# Patient Record
Sex: FEMALE | Race: BLACK OR AFRICAN AMERICAN | Employment: OTHER | URBAN - METROPOLITAN AREA
[De-identification: names, ages, dates, MRNs, and addresses within clinical notes are randomized per-mention and may not be internally consistent; named-entity substitution may affect disease eponyms.]

---

## 2020-10-28 ENCOUNTER — APPOINTMENT (OUTPATIENT)
Dept: CT IMAGING | Age: 70
End: 2020-10-28
Payer: MEDICARE

## 2020-10-28 ENCOUNTER — HOSPITAL ENCOUNTER (EMERGENCY)
Age: 70
Discharge: HOME | End: 2020-10-28
Attending: EMERGENCY MEDICINE
Payer: MEDICARE

## 2020-10-28 VITALS
DIASTOLIC BLOOD PRESSURE: 66 MMHG | OXYGEN SATURATION: 97 % | HEART RATE: 65 BPM | WEIGHT: 170 LBS | RESPIRATION RATE: 16 BRPM | SYSTOLIC BLOOD PRESSURE: 140 MMHG | BODY MASS INDEX: 29.02 KG/M2 | HEIGHT: 64 IN | TEMPERATURE: 98.5 F

## 2020-10-28 LAB
-: ABNORMAL
ABSOLUTE EOS #: 0.07 K/UL (ref 0–0.44)
ABSOLUTE IMMATURE GRANULOCYTE: 0.02 K/UL (ref 0–0.3)
ABSOLUTE LYMPH #: 2.66 K/UL (ref 1.1–3.7)
ABSOLUTE MONO #: 0.36 K/UL (ref 0.1–1.2)
ALBUMIN SERPL-MCNC: 4.3 G/DL (ref 3.5–5.2)
ALBUMIN/GLOBULIN RATIO: ABNORMAL (ref 1–2.5)
ALP BLD-CCNC: 52 U/L (ref 35–104)
ALT SERPL-CCNC: 8 U/L (ref 5–33)
AMORPHOUS: ABNORMAL
ANION GAP SERPL CALCULATED.3IONS-SCNC: 13 MMOL/L (ref 9–17)
AST SERPL-CCNC: 12 U/L
BACTERIA: ABNORMAL
BASOPHILS # BLD: 1 % (ref 0–2)
BASOPHILS ABSOLUTE: 0.03 K/UL (ref 0–0.2)
BILIRUB SERPL-MCNC: 0.53 MG/DL (ref 0.3–1.2)
BILIRUBIN URINE: ABNORMAL
BUN BLDV-MCNC: 8 MG/DL (ref 8–23)
BUN/CREAT BLD: 16 (ref 9–20)
CALCIUM SERPL-MCNC: 9.3 MG/DL (ref 8.6–10.4)
CASTS UA: ABNORMAL /LPF
CHLORIDE BLD-SCNC: 102 MMOL/L (ref 98–107)
CO2: 22 MMOL/L (ref 20–31)
COLOR: YELLOW
COMMENT UA: ABNORMAL
CREAT SERPL-MCNC: 0.49 MG/DL (ref 0.5–0.9)
CRYSTALS, UA: ABNORMAL /HPF
DIFFERENTIAL TYPE: NORMAL
EOSINOPHILS RELATIVE PERCENT: 1 % (ref 1–4)
EPITHELIAL CELLS UA: ABNORMAL /HPF (ref 0–5)
GFR AFRICAN AMERICAN: >60 ML/MIN
GFR NON-AFRICAN AMERICAN: >60 ML/MIN
GFR SERPL CREATININE-BSD FRML MDRD: ABNORMAL ML/MIN/{1.73_M2}
GFR SERPL CREATININE-BSD FRML MDRD: ABNORMAL ML/MIN/{1.73_M2}
GLUCOSE BLD-MCNC: 104 MG/DL (ref 70–99)
GLUCOSE URINE: NEGATIVE
HCT VFR BLD CALC: 41.9 % (ref 36.3–47.1)
HEMOGLOBIN: 14.3 G/DL (ref 11.9–15.1)
IMMATURE GRANULOCYTES: 0 %
KETONES, URINE: ABNORMAL
LEUKOCYTE ESTERASE, URINE: NEGATIVE
LIPASE: 12 U/L (ref 13–60)
LYMPHOCYTES # BLD: 40 % (ref 24–43)
MCH RBC QN AUTO: 31 PG (ref 25.2–33.5)
MCHC RBC AUTO-ENTMCNC: 34.1 G/DL (ref 28.4–34.8)
MCV RBC AUTO: 90.7 FL (ref 82.6–102.9)
MONOCYTES # BLD: 5 % (ref 3–12)
MUCUS: ABNORMAL
NITRITE, URINE: NEGATIVE
NRBC AUTOMATED: 0 PER 100 WBC
OTHER OBSERVATIONS UA: ABNORMAL
PDW BLD-RTO: 12.5 % (ref 11.8–14.4)
PH UA: 6 (ref 5–8)
PLATELET # BLD: 264 K/UL (ref 138–453)
PLATELET ESTIMATE: NORMAL
PMV BLD AUTO: 9.6 FL (ref 8.1–13.5)
POTASSIUM SERPL-SCNC: 3.7 MMOL/L (ref 3.7–5.3)
PROTEIN UA: NEGATIVE
RBC # BLD: 4.62 M/UL (ref 3.95–5.11)
RBC # BLD: NORMAL 10*6/UL
RBC UA: ABNORMAL /HPF (ref 0–2)
RENAL EPITHELIAL, UA: ABNORMAL /HPF
SEG NEUTROPHILS: 53 % (ref 36–65)
SEGMENTED NEUTROPHILS ABSOLUTE COUNT: 3.47 K/UL (ref 1.5–8.1)
SODIUM BLD-SCNC: 137 MMOL/L (ref 135–144)
SPECIFIC GRAVITY UA: 1.04 (ref 1–1.03)
TOTAL PROTEIN: 7.4 G/DL (ref 6.4–8.3)
TRICHOMONAS: ABNORMAL
TURBIDITY: ABNORMAL
URINE HGB: ABNORMAL
UROBILINOGEN, URINE: NORMAL
WBC # BLD: 6.6 K/UL (ref 3.5–11.3)
WBC # BLD: NORMAL 10*3/UL
WBC UA: ABNORMAL /HPF (ref 0–5)
YEAST: ABNORMAL

## 2020-10-28 PROCEDURE — 81001 URINALYSIS AUTO W/SCOPE: CPT

## 2020-10-28 PROCEDURE — 83690 ASSAY OF LIPASE: CPT

## 2020-10-28 PROCEDURE — 74176 CT ABD & PELVIS W/O CONTRAST: CPT

## 2020-10-28 PROCEDURE — 99283 EMERGENCY DEPT VISIT LOW MDM: CPT

## 2020-10-28 PROCEDURE — 80053 COMPREHEN METABOLIC PANEL: CPT

## 2020-10-28 PROCEDURE — 85025 COMPLETE CBC W/AUTO DIFF WBC: CPT

## 2020-10-28 RX ORDER — NAPROXEN 500 MG/1
500 TABLET ORAL 2 TIMES DAILY WITH MEALS
Qty: 20 TABLET | Refills: 0 | Status: SHIPPED | OUTPATIENT
Start: 2020-10-28 | End: 2021-03-22 | Stop reason: ALTCHOICE

## 2020-10-28 RX ORDER — ALBUTEROL SULFATE 90 UG/1
2 AEROSOL, METERED RESPIRATORY (INHALATION) EVERY 4 HOURS PRN
Qty: 1 INHALER | Refills: 0 | Status: SHIPPED | OUTPATIENT
Start: 2020-10-28

## 2020-10-28 RX ORDER — HYDROCHLOROTHIAZIDE 12.5 MG/1
12.5 CAPSULE, GELATIN COATED ORAL DAILY
COMMUNITY
End: 2020-11-06 | Stop reason: SDUPTHER

## 2020-10-28 RX ORDER — METHOCARBAMOL 500 MG/1
500 TABLET, FILM COATED ORAL 3 TIMES DAILY
Qty: 21 TABLET | Refills: 0 | Status: SHIPPED | OUTPATIENT
Start: 2020-10-28 | End: 2020-11-04

## 2020-10-28 RX ORDER — ASPIRIN 81 MG/1
81 TABLET ORAL DAILY
COMMUNITY

## 2020-10-28 RX ORDER — AMLODIPINE BESYLATE 10 MG/1
10 TABLET ORAL DAILY
COMMUNITY
End: 2020-11-06 | Stop reason: SDUPTHER

## 2020-10-28 ASSESSMENT — PAIN DESCRIPTION - LOCATION: LOCATION: FLANK

## 2020-10-28 ASSESSMENT — PAIN DESCRIPTION - PAIN TYPE: TYPE: ACUTE PAIN

## 2020-10-28 ASSESSMENT — PAIN SCALES - GENERAL: PAINLEVEL_OUTOF10: 9

## 2020-10-28 ASSESSMENT — PAIN DESCRIPTION - ORIENTATION: ORIENTATION: RIGHT

## 2020-10-28 ASSESSMENT — PAIN DESCRIPTION - DESCRIPTORS: DESCRIPTORS: ACHING

## 2020-10-28 NOTE — ED PROVIDER NOTES
[facility] ED  [facility]      Pt Name: Dusty Loyola  MRN: [id number]  [place] 1950  Date ofevaluation: 10/28/2020  Provider: John Lafleur Dr       Chief Complaint   Patient presents with    Flank Pain     right flank pain x yesterday          HISTORY OF PRESENT ILLNESS  (Location/Symptom, Timing/Onset, Context/Setting, Quality, Duration, Modifying Factors, Severity.)   Dusty Loyola is a 69 y.o. female who presents to the emergency department with right flank pain since yesterday reports some nausea with couple episodes of vomiting. No definite alleviating aggravating factors. Patient states when she felt like this in the past it was secondary to a low potassium. Nursing Notes were reviewed. ALLERGIES     Patient has no known allergies. CURRENT MEDICATIONS       Discharge Medication List as of 10/28/2020  7:21 PM      CONTINUE these medications which have NOT CHANGED    Details   hydroCHLOROthiazide (MICROZIDE) 12.5 MG capsule Take 12.5 mg by mouth dailyHistorical Med      amLODIPine (NORVASC) 10 MG tablet Take 10 mg by mouth dailyHistorical Med      aspirin 81 MG EC tablet Take 81 mg by mouth dailyHistorical Med             PAST MEDICAL HISTORY   No past medical history on file. SURGICAL HISTORY     No past surgical history on file. FAMILY HISTORY     No family history on file. No family status information on file. SOCIAL HISTORY      reports that she has been smoking cigarettes. She has been smoking about 0.50 packs per day. She has never used smokeless tobacco. She reports previous alcohol use. She reports previous drug use. REVIEW OFSYSTEMS    (2-9 systems for level 4, 10 or more for level 5)   Review of Systems    Except as noted above the remainder of the review of systems was reviewed and negative.      PHYSICAL EXAM    (up to 7 for level 4, 8 or more for level 5)     ED Triage Vitals   BP Temp Temp Source Pulse Resp SpO2 Height Weight   10/28/20 1554 10/28/20 1552 10/28/20 1552 10/28/20 1552 10/28/20 1552 10/28/20 1552 10/28/20 1552 10/28/20 1552   (!) 140/66 98.5 °F (36.9 °C) Oral 65 16 97 % 5' 4\" (1.626 m) 170 lb (77.1 kg)      Physical Exam  Constitutional:       Appearance: She is well-developed. HENT:      Head: Normocephalic and atraumatic. Neck:      Musculoskeletal: Normal range of motion and neck supple. Cardiovascular:      Rate and Rhythm: Normal rate and regular rhythm. Pulmonary:      Effort: Pulmonary effort is normal.      Breath sounds: Normal breath sounds. Abdominal:      Palpations: Abdomen is soft. Musculoskeletal: Normal range of motion. Skin:     General: Skin is warm. Findings: No rash. Neurological:      Mental Status: She is alert and oriented to person, place, and time. Psychiatric:         Behavior: Behavior normal.                 DIAGNOSTIC RESULTS     EKG: All EKG's are interpreted by the Emergency Department Physician who either signs or Co-signs this chart in the absence of a cardiologist.        RADIOLOGY:   Non-plain film images such as CT, Ultrasound and MRI are read by the radiologist. Plain radiographic images arevisualized and preliminarily interpreted by the emergency physician with the below findings:        Interpretation per the Radiologist below, if available at thetime of this note:          ED BEDSIDE ULTRASOUND:   Performed by ED Physician - none    LABS:  Labs Reviewed   URINE RT REFLEX TO CULTURE - Abnormal; Notable for the following components:       Result Value    Turbidity UA SLIGHTLY CLOUDY (*)     Bilirubin Urine   (*)     Value: Presumptive positive. Unable to confirm due to unavailability of reagent.     Ketones, Urine 2+ (*)     Specific Gravity, UA 1.041 (*)     Urine Hgb TRACE (*)     All other components within normal limits   MICROSCOPIC URINALYSIS - Abnormal; Notable for the following components:    Bacteria, UA MODERATE (*)     Mucus, UA 1+ (*)     All other components within normal limits   COMPREHENSIVE METABOLIC PANEL - Abnormal; Notable for the following components:    Glucose 104 (*)     CREATININE 0.49 (*)     All other components within normal limits   LIPASE - Abnormal; Notable for the following components:    Lipase 12 (*)     All other components within normal limits   CBC WITH AUTO DIFFERENTIAL       All other labs were within normal range or not returned as of this dictation. EMERGENCY DEPARTMENT COURSE and DIFFERENTIAL DIAGNOSIS/MDM:   Vitals:    Vitals:    10/28/20 1552 10/28/20 1554   BP:  (!) 140/66   Pulse: 65    Resp: 16    Temp: 98.5 °F (36.9 °C)    TempSrc: Oral    SpO2: 97%    Weight: 170 lb (77.1 kg)    Height: 5' 4\" (1.626 m)      Work-up is negative for any acute process. Patient will be discharged home and treated symptomatically. Symptoms could related to muscle issue. Warning signs for return were discussed. No signs of appendicitis. Gallbladder has been removed. Patient also requested a inhaler refill. She moved back from [place] to this area in March and has recently run out of her inhaler and requested a refill. CONSULTS:  None    PROCEDURES:  Procedures        FINAL IMPRESSION      1. Flank pain    2. Pulmonary nodules          DISPOSITION/PLAN   DISPOSITION Decision To Discharge 10/28/2020 07:20:07 PM      PATIENTREFERRED TO:   No follow-up provider specified.     DISCHARGE MEDICATIONS:     Discharge Medication List as of 10/28/2020  7:21 PM      START taking these medications    Details   naproxen (NAPROSYN) 500 MG tablet Take 1 tablet by mouth 2 times daily (with meals), Disp-20 tablet,R-0Print      methocarbamol (ROBAXIN) 500 MG tablet Take 1 tablet by mouth 3 times daily for 7 days, Disp-21 tablet,R-0Print                 (Please note that portions of this note were completed with a voice recognition program.  Efforts were made to edit thedictations but occasionally words are detailed exam mis-transcribed.)    ABBY Hodge PA-C  10/28/20 1940 detailed exam

## 2020-11-06 ENCOUNTER — OFFICE VISIT (OUTPATIENT)
Dept: FAMILY MEDICINE CLINIC | Age: 70
End: 2020-11-06
Payer: MEDICARE

## 2020-11-06 ENCOUNTER — HOSPITAL ENCOUNTER (OUTPATIENT)
Age: 70
Setting detail: SPECIMEN
Discharge: HOME | End: 2020-11-06
Payer: MEDICARE

## 2020-11-06 VITALS
BODY MASS INDEX: 23.12 KG/M2 | HEIGHT: 64 IN | SYSTOLIC BLOOD PRESSURE: 132 MMHG | TEMPERATURE: 97.1 F | DIASTOLIC BLOOD PRESSURE: 81 MMHG | WEIGHT: 135.4 LBS | HEART RATE: 72 BPM | OXYGEN SATURATION: 99 %

## 2020-11-06 PROBLEM — E27.8 ADRENAL NODULE (HCC): Status: ACTIVE | Noted: 2020-11-06

## 2020-11-06 PROBLEM — R91.1 PULMONARY NODULE, LEFT: Status: ACTIVE | Noted: 2020-11-06

## 2020-11-06 PROBLEM — R73.9 HYPERGLYCEMIA: Status: ACTIVE | Noted: 2020-11-06

## 2020-11-06 PROBLEM — I10 ESSENTIAL HYPERTENSION: Status: ACTIVE | Noted: 2020-11-06

## 2020-11-06 PROBLEM — E27.9 ADRENAL NODULE (HCC): Status: ACTIVE | Noted: 2020-11-06

## 2020-11-06 PROBLEM — M25.561 CHRONIC PAIN OF RIGHT KNEE: Status: ACTIVE | Noted: 2020-11-06

## 2020-11-06 PROBLEM — R01.1 MURMUR, CARDIAC: Status: ACTIVE | Noted: 2020-11-06

## 2020-11-06 PROBLEM — G89.29 CHRONIC PAIN OF RIGHT KNEE: Status: ACTIVE | Noted: 2020-11-06

## 2020-11-06 LAB
BILIRUBIN, POC: NORMAL
BLOOD URINE, POC: NORMAL
CHOLESTEROL, FASTING: 182 MG/DL
CHOLESTEROL/HDL RATIO: 3.9
CLARITY, POC: NORMAL
COLOR, POC: NORMAL
GLUCOSE URINE, POC: NORMAL
HBA1C MFR BLD: 5.4 %
HDLC SERPL-MCNC: 47 MG/DL
HEPATITIS C ANTIBODY: NONREACTIVE
KETONES, POC: NORMAL
LDL CHOLESTEROL: 117 MG/DL (ref 0–130)
LEUKOCYTE EST, POC: NORMAL
NITRITE, POC: NORMAL
PH, POC: 6
PROTEIN, POC: 30
SPECIFIC GRAVITY, POC: 1.03
TRIGLYCERIDE, FASTING: 90 MG/DL
UROBILINOGEN, POC: 4
VLDLC SERPL CALC-MCNC: NORMAL MG/DL (ref 1–30)

## 2020-11-06 PROCEDURE — 81002 URINALYSIS NONAUTO W/O SCOPE: CPT | Performed by: NURSE PRACTITIONER

## 2020-11-06 PROCEDURE — 99205 OFFICE O/P NEW HI 60 MIN: CPT | Performed by: NURSE PRACTITIONER

## 2020-11-06 PROCEDURE — 83036 HEMOGLOBIN GLYCOSYLATED A1C: CPT | Performed by: NURSE PRACTITIONER

## 2020-11-06 PROCEDURE — 93000 ELECTROCARDIOGRAM COMPLETE: CPT | Performed by: NURSE PRACTITIONER

## 2020-11-06 PROCEDURE — 99406 BEHAV CHNG SMOKING 3-10 MIN: CPT | Performed by: NURSE PRACTITIONER

## 2020-11-06 PROCEDURE — G0444 DEPRESSION SCREEN ANNUAL: HCPCS | Performed by: NURSE PRACTITIONER

## 2020-11-06 RX ORDER — AMLODIPINE BESYLATE 10 MG/1
10 TABLET ORAL DAILY
Qty: 30 TABLET | Refills: 2 | Status: SHIPPED | OUTPATIENT
Start: 2020-11-06 | End: 2021-03-22 | Stop reason: SDUPTHER

## 2020-11-06 RX ORDER — HYDROCHLOROTHIAZIDE 12.5 MG/1
12.5 CAPSULE, GELATIN COATED ORAL DAILY
Qty: 30 CAPSULE | Refills: 2 | Status: SHIPPED | OUTPATIENT
Start: 2020-11-06 | End: 2021-03-22 | Stop reason: SDUPTHER

## 2020-11-06 SDOH — ECONOMIC STABILITY: FOOD INSECURITY: WITHIN THE PAST 12 MONTHS, YOU WORRIED THAT YOUR FOOD WOULD RUN OUT BEFORE YOU GOT MONEY TO BUY MORE.: NEVER TRUE

## 2020-11-06 SDOH — ECONOMIC STABILITY: FOOD INSECURITY: WITHIN THE PAST 12 MONTHS, THE FOOD YOU BOUGHT JUST DIDN'T LAST AND YOU DIDN'T HAVE MONEY TO GET MORE.: NEVER TRUE

## 2020-11-06 SDOH — ECONOMIC STABILITY: INCOME INSECURITY: HOW HARD IS IT FOR YOU TO PAY FOR THE VERY BASICS LIKE FOOD, HOUSING, MEDICAL CARE, AND HEATING?: NOT HARD AT ALL

## 2020-11-06 ASSESSMENT — PATIENT HEALTH QUESTIONNAIRE - PHQ9
1. LITTLE INTEREST OR PLEASURE IN DOING THINGS: 0
SUM OF ALL RESPONSES TO PHQ QUESTIONS 1-9: 0
2. FEELING DOWN, DEPRESSED OR HOPELESS: 0
SUM OF ALL RESPONSES TO PHQ QUESTIONS 1-9: 0
SUM OF ALL RESPONSES TO PHQ9 QUESTIONS 1 & 2: 0
SUM OF ALL RESPONSES TO PHQ QUESTIONS 1-9: 0

## 2020-11-06 NOTE — PROGRESS NOTES
FABIEN Barcenas-CECILIO  [address]  [id number] [facility] [place]. Pete Oliver  [facility], [address]  [phone]  [phone]    Elizabeth Regalado is a 69 y.o. female who is here with c/o of:    Chief Complaint: New Patient      Patient Accompanied by: n/a    HPI - Elizabeth Regalado is here today with c/o:    Patient recently moved here from [place] (March) and is wanting to establish care today for her chronic conditions and f/u from her recent ED visit for Right flank pain. Right flank pain   - patient reports this has been a problem on/off for a few months and became worse a week ago   - since hospitalization, she reports the pain has subsided   - she denies dysuria, frequency, urgency    HTN   - she is not compliant with low sodium diet and admits she likes to eat junk food and is not physically active   - she does c/o palpitations, chest pressure and has known murmur   - she denies h/a, lower extremity edema   - current medications: HCTZ 12.5mg daily, amlodipine 10mg daily and has not taken for the past 2 days as she was out of her medication    Lab Results   Component Value Date    LABA1C 5.4 11/06/2020     Lab Results   Component Value Date    CREATININE 0.49 (L) 10/28/2020     Murmur   - patient reports murmur of several years and denies consultation with cardiology or previous Echo   - she does report chest pressure, palpitations and occasional exertional sob    Right knee pain   - this is a chronic problem of several years   - she reports she has had surgery on this knee in the past   - she is currently taking NSAIDS for this and this is helping   - aggravating factors include standing and walking    There is no problem list on file for this patient. Past Medical History:   Diagnosis Date    Hypertension       No past surgical history on file. No family history on file.   Social History     Tobacco Use    Smoking status: Heavy Tobacco Smoker     Packs/day: 0.50     Types: Cigarettes    Smokeless tobacco: Never Used   Substance Use Topics    Alcohol use: Not Currently     ALLERGIES:  No Known Allergies       Subjective     · Constitutional:  Negative for activity change, appetite change,unexpected weight change, chills, fever, and fatigue. · HENT: Negative for ear pain, sore throat,  Rhinorrhea, sinus pain, sinus pressure, congestion. · Eyes:  Negative for pain and discharge. · Respiratory:  Negative for chest tightness, shortness of breath, wheezing, and cough. · Cardiovascular:  Negative for chest pain, palpitations and leg swelling. · Gastrointestinal: Negative for abdominal pain, blood in stool, constipation,diarrhea, nausea and vomiting. · Endocrine: Negative for cold intolerance, heat intolerance, polydipsia, polyphagia and polyuria. · Genitourinary: Negative for difficulty urinating, dysuria, frequency, hematuria and urgency. Positive for right flank pain  · Musculoskeletal: Negative for arthralgias, back pain, joint swelling, myalgias, neck pain and neck stiffness. Positive for right knee pain  · Skin: Negative for rash and wound. · Allergic/Immunologic: Negative for environmental allergies and food allergies. · Neurological:  Negative for dizziness, light-headedness, numbness and headaches. · Hematological:  Negative for adenopathy. Does not bruise/bleed easily. · Psychiatric/Behavioral: Negative for self-injury, sleep disturbance and suicidal ideas. Objective     PHYSICAL EXAM:   · Constitutional: Josefa Shell is oriented to person, place, and time. Vital signs are normal. Appears well-developed and well-nourished. · HEENT:   · Head: Normocephalic and atraumatic. Right Ear: Hearing and external ear normal. TM normal  Canal normal  · Left Ear: Hearing and external ear normal. TM normal Canal normal  · Eyes:PERRL, EOMI, Conjunctiva normal, No discharge. · Neck: Full passive range of motion. Non-tender on palpation. Neck supple. No thyromegaly present. Trachea normal.  · Cardiovascular: Normal rate, regular rhythm, S1, S2, Grade IV murmur, no gallop, no friction rub, intact distal pulses. · Pulmonary/Chest: Breath sounds are clear throughout, No respiratory distress, No wheezing, No chest tenderness. Effort normal  · Abdominal: Soft. Normal appearance, bowel sounds are present and normoactive. There is no hepatosplenomegaly. There is no tenderness. There is no CVA tenderness. Musculoskeletal: Extremities appear regular and symmetric. No evident masses, lesions, foreign bodies, or other abnormalities. No edema. No tenderness on palpation. Joints are stable. Full ROM, strength and tone are within normal limits. Physical Exam  Musculoskeletal:      Right knee: She exhibits normal range of motion, no swelling, no effusion, no ecchymosis, no deformity, no erythema, normal alignment, no LCL laxity, normal patellar mobility, no bony tenderness, normal meniscus and no MCL laxity. No tenderness found. · Lymphadenopathy: No lymphadenopathy noted. · Neurological: Alert and oriented to person, place, and time. Normal motor function, Normal sensory function, No focal deficits noted. He has normal strength. · Skin: Skin is warm, dry and intact. No obvious lesions on exposed skin  · Psychiatric: Normal mood and affect. Speech is normal and behavior is normal.     Nursing note and vitals reviewed. Blood pressure 132/81, pulse 72, temperature 97.1 °F (36.2 °C), temperature source Temporal, height 5' 4\" (1.626 m), weight 135 lb 6.4 oz (61.4 kg), SpO2 99 %. Body mass index is 23.24 kg/m².     Wt Readings from Last 3 Encounters:   11/06/20 135 lb 6.4 oz (61.4 kg)   10/28/20 170 lb (77.1 kg)     BP Readings from Last 3 Encounters:   11/06/20 132/81   10/28/20 (!) 140/66       Results for POC orders placed in visit on 11/06/20   POCT glycosylated hemoglobin (Hb A1C)   Result Value Ref Range    Hemoglobin A1C 5.4 %   POCT Urinalysis no Micro   Result Value Ref Range Color, UA Dark Yellow     Clarity, UA Cloudy     Glucose, UA POC Neg     Bilirubin, UA Small     Ketones, UA Neg     Spec Grav, UA 1.030     Blood, UA POC Neg     pH, UA 6.0     Protein, UA POC 30     Urobilinogen, UA 4.0     Leukocytes, UA Neg     Nitrite, UA Neg        Completed Orders/Prescriptions   Orders Placed This Encounter   Medications    hydroCHLOROthiazide (MICROZIDE) 12.5 MG capsule     Sig: Take 1 capsule by mouth daily     Dispense:  30 capsule     Refill:  2    amLODIPine (NORVASC) 10 MG tablet     Sig: Take 1 tablet by mouth daily     Dispense:  30 tablet     Refill:  2               AssessmentPlan/Medical Decision Making     *all notes and dx testing reviewed from recent ED visit*    1. Essential hypertension  - controlled  - reviewed DASH diet  - stressed the importance of smoking cessation  - hydroCHLOROthiazide (MICROZIDE) 12.5 MG capsule; Take 1 capsule by mouth daily  Dispense: 30 capsule; Refill: 2  - amLODIPine (NORVASC) 10 MG tablet; Take 1 tablet by mouth daily  Dispense: 30 tablet; Refill: 2    2. Palpitations  - ECHO Complete 2D W Doppler W Color; Future  - 23619 - [place] ELECTROCARDIOGRAM, COMPLETE  - [facility] - Brett Montenegro MD, Cardiology, [place]    3. Chest pressure  - will consult cardiology  - suggest stress test   - ECHO Complete 2D W Doppler W Color; Future  - [id number] - [place] ELECTROCARDIOGRAM, COMPLETE  - [facility] - Brett Montenegro MD, Cardiology, [place]    4. Murmur, cardiac  - ECHO Complete 2D W Doppler W Color; Future  - [facility] - Brett Montenegro MD, Cardiology, [place]    5. Encounter for screening mammogram for breast cancer  - [facility] Digital Screen Bilateral [[id number]]; Future    6. Hyperglycemia  - POCT Urinalysis no Micro  - POCT glycosylated hemoglobin (Hb A1C)    7. Ketonuria  - POCT Urinalysis no Micro  - Culture, Urine; Future    8. Adrenal nodule ([address]  - suspicious for cancerous origin - will consult urology  - [facility], [facility]    9.  Chronic pain of right knee  - will address at f/u appt    10. Colon cancer screening  - Jorge Alex MD, Gastroenterology, [address]    11. Hematuria, unspecified type  - Culture, Urine; Future    12. Encounter for hepatitis C screening test for low risk patient  - Hepatitis C Antibody; Future    13. Screening cholesterol level  - Lipid, Fasting; Future    14. Post-menopausal  - DEXA Bone Density Axial Skeleton; Future    15. Personal history of tobacco use, presenting hazards to health  - Tobacco Cessation Counseling: Patient advised about behavior change, including information about personal health harms, usage of appropriate cessation measures and benefits of cessation. Time spent (minutes): 5  - [place] TOBACCO USE CESSATION INTERMEDIATE 3-10 MINUTES [74912]    16. Encounter for medical examination to establish care    17. Nodule of lower lobe of left lung  - will continue to monitor at this time    18. Depression screening negative    Return in about 1 month (around 12/6/2020). 1.  Sharyn Galloway received counseling on the following healthy behaviors: nutrition, exercise, medication adherence and tobacco cessation  2. Patient given educational materials - see patient instructions  3. Was a self-tracking handout given in paper form or via [employer]? No  If yes, see orders or list here. 4.  Discussed use, benefit, and side effects of prescribed medications. Barriers to medication compliance addressed. All patient questions answered. Pt voiced understanding. 5.  Reviewed prior labs, imaging, consultation, follow up, and health maintenance  6. Continue current medications, diet and exercise. 7. Discussed use, benefit, and side effects of prescribed medications. Barriers to medication compliance addressed. All her questions were answered. Pt voiced understanding. Sharyn Galloway will continue current medications, diet and exercise.     Patient given educational materials on smoking cessation    Of the 60 minute duration appointment visit, Olga Siddiqui CNP spent at least 50% of the face-to-face time in counseling, explanation of diagnosis, planning of further management, and answering all questions. Signed:  Olga Siddiqui CNP    This note is created with the assistance of a speech-recognition program.  While intending to generate a document that actually reflects the content of the visit, no guarantees can be provided that every mistake has been identified and corrected by editing.

## 2020-11-06 NOTE — PATIENT INSTRUCTIONS
smoking. · Consider signing up for a smoking cessation program, such as the American Lung Association's Freedom from Smoking program.  · Get text messaging support. Go to the website at www.smokefree. gov to sign up for the [facility] program.  · Set a quit date. Pick your date carefully so that it is not right in the middle of a big deadline or stressful time. Once you quit, do not even take a puff. Get rid of all ashtrays and lighters after your last cigarette. Clean your house and your clothes so that they do not smell of smoke. · Learn how to be a nonsmoker. Think about ways you can avoid those things that make you reach for a cigarette. ? Avoid situations that put you at greatest risk for smoking. For some people, it is hard to have a drink with friends without smoking. For others, they might skip a coffee break with coworkers who smoke. ? Change your daily routine. Take a different route to work or eat a meal in a different place. · Cut down on stress. Calm yourself or release tension by doing an activity you enjoy, such as reading a book, taking a hot bath, or gardening. · Talk to your doctor or pharmacist about nicotine replacement therapy, which replaces the nicotine in your body. You still get nicotine but you do not use tobacco. Nicotine replacement products help you slowly reduce the amount of nicotine you need. These products come in several forms, many of them available over-the-counter:  ? Nicotine patches  ? Nicotine gum and lozenges  ? Nicotine inhaler  · Ask your doctor about bupropion (Wellbutrin) or varenicline (Chantix), which are prescription medicines. They do not contain nicotine. They help you by reducing withdrawal symptoms, such as stress and anxiety. · Some people find hypnosis, acupuncture, and massage helpful for ending the smoking habit. · Eat a healthy diet and get regular exercise. Having healthy habits will help your body move past its craving for nicotine.   · Be prepared to keep trying. Most people are not successful the first few times they try to quit. Do not get mad at yourself if you smoke again. Make a list of things you learned and think about when you want to try again, such as next week, next month, or next year. Where can you learn more? Go to https://chpepiceweb.healthOUTSIDE THE BOX MARK[employer]. org and sign in to your [employer] account. Enter N068 in the [employer] box to learn more about \"Stopping Smoking: Care Instructions. \"     If you do not have an account, please click on the \"Sign Up Now\" link. Current as of: March 12, 2020               Content Version: 12.6  © 2006-2020 [employer]. Care instructions adapted under license by [facility] ([facility]). If you have questions about a medical condition or this instruction, always ask your healthcare professional. [place] [address] any warranty or liability for your use of this information. Learning About Benefits From Quitting Smoking  How does quitting smoking make you healthier? If you're thinking about quitting smoking, you may have a few reasons to be smoke-free. Your health may be one of them. · When you quit smoking, you lower your risks for cancer, lung disease, heart attack, stroke, blood vessel disease, and blindness from macular degeneration. · When you're smoke-free, you get sick less often, and you heal faster. You are less likely to get colds, flu, bronchitis, and pneumonia. · As a nonsmoker, you may find that your mood is better and you are less stressed. When and how will you feel healthier? Quitting has real health benefits that start from day 1 of being smoke-free. And the longer you stay smoke-free, the healthier you get and the better you feel. The first hours  · After just 20 minutes, your blood pressure and heart rate go down. That means there's less stress on your heart and blood vessels.   · Within 12 hours, the level of carbon monoxide in your blood drops back to normal. That makes room for more oxygen. With more oxygen in your body, you may notice that you have more energy than when you smoked. After 2 weeks  · Your lungs start to work better. · Your risk of heart attack starts to drop. After 1 month  · When your lungs are clear, you cough less and breathe deeper, so it's easier to be active. · Your sense of taste and smell return. That means you can enjoy food more than you have since you started smoking. Over the years  · Over the years, your risks of heart disease, heart attack, and stroke are lower. · After 10 years, your risk of dying from lung cancer is cut by about half. And your risk for many other types of cancer is lower too. How would quitting help others in your life? When you quit smoking, you improve the health of everyone who now breathes in your smoke. · Their heart, lung, and cancer risks drop, much like yours. · They are sick less. For babies and small children, living smoke-free means they're less likely to have ear infections, pneumonia, and bronchitis. · If you're a woman who is or will be pregnant someday, quitting smoking means a healthier . · Children who are close to you are less likely to become adult smokers. Where can you learn more? Go to https://[employer]och[employer]. org and sign in to your [employer] account. Enter [phone] in the [facility] box to learn more about \"Learning About Benefits From Quitting Smoking. \"     If you do not have an account, please click on the \"Sign Up Now\" link. Current as of: 2020               Content Version: 12.6  © 0463-3032 [employer]. Care instructions adapted under license by [facility] ([facility]). If you have questions about a medical condition or this instruction, always ask your healthcare professional. [place] [address] any warranty or liability for your use of this information.

## 2020-11-07 LAB
CULTURE: NORMAL
Lab: NORMAL
SPECIMEN DESCRIPTION: NORMAL

## 2020-11-13 ENCOUNTER — HOSPITAL ENCOUNTER (OUTPATIENT)
Dept: NON INVASIVE DIAGNOSTICS | Age: 70
Discharge: HOME | End: 2020-11-13
Payer: MEDICARE

## 2020-11-13 LAB
LV EF: 68 %
LVEF MODALITY: NORMAL

## 2020-11-13 PROCEDURE — 93306 TTE W/DOPPLER COMPLETE: CPT

## 2020-12-07 ENCOUNTER — OFFICE VISIT (OUTPATIENT)
Dept: UROLOGY | Age: 70
End: 2020-12-07
Payer: MEDICARE

## 2020-12-07 VITALS
BODY MASS INDEX: 27.83 KG/M2 | WEIGHT: 163 LBS | DIASTOLIC BLOOD PRESSURE: 63 MMHG | SYSTOLIC BLOOD PRESSURE: 118 MMHG | HEIGHT: 64 IN | TEMPERATURE: 97.5 F

## 2020-12-07 LAB
BILIRUBIN, POC: NORMAL
BLOOD URINE, POC: NEGATIVE
CLARITY, POC: CLEAR
COLOR, POC: YELLOW
GLUCOSE URINE, POC: NEGATIVE
KETONES, POC: NEGATIVE
LEUKOCYTE EST, POC: NEGATIVE
NITRITE, POC: NEGATIVE
PH, POC: 7
PROTEIN, POC: NEGATIVE
SPECIFIC GRAVITY, POC: >=1.03
UROBILINOGEN, POC: 2

## 2020-12-07 PROCEDURE — 99204 OFFICE O/P NEW MOD 45 MIN: CPT | Performed by: UROLOGY

## 2020-12-07 PROCEDURE — 99202 OFFICE O/P NEW SF 15 MIN: CPT | Performed by: UROLOGY

## 2020-12-07 PROCEDURE — 81003 URINALYSIS AUTO W/O SCOPE: CPT | Performed by: UROLOGY

## 2020-12-07 RX ORDER — DEXAMETHASONE 1 MG
1 TABLET ORAL ONCE
Qty: 1 TABLET | Refills: 0 | Status: SHIPPED | OUTPATIENT
Start: 2020-12-07 | End: 2020-12-07

## 2020-12-07 NOTE — PROGRESS NOTES
Dr. Jame Pascal MD  [facility])  Urology Clinic  New Patient Visit      Patient:  Kris De Dios  YOB: 1950  Date: 12/7/2020    HISTORY OF PRESENT ILLNESS:   The patient is a 69 y.o. female who presents today for evaluation of the following problems: Bilateral adrenal hyperplasia. Hounsfield units on the left were -3, on the right 7. No evidence of cancer based on imaging characteristics. She has no evidence of insulin resistance or visceral obesity. She is hypertensive but well controlled on 2 oral medications, Norvasc and hydrochlorothiazide. No evidence of panic attacks, sweating episodes, etc.  Overall the problem(s) : show no change. Associated Symptoms: No dysuria, gross hematuria. Pain Severity: 0/10    Summary of old records: {  None    Imaging/Labs reviewed during today's visit:  I have independently reviewed and verified the following films during today's visit. CT scan of abdomen from October 2020 was independently reviewed and verified by us. Bilateral adrenal hyperplasia. Imaging characteristics not concerning for adrenal cortical carcinoma or metastasis.     Urinalysis today:  Results for POC orders placed in visit on 12/07/20   POCT Urinalysis No Micro (Auto)   Result Value Ref Range    Color, UA Yellow     Clarity, UA Clear     Glucose, UA POC Negative     Bilirubin, UA SMALL     Ketones, UA Negative     Spec Grav, UA >=1.030     Blood, UA POC Negative     pH, UA 7.0     Protein, UA POC Negative     Urobilinogen, UA 2.0     Leukocytes, UA Negative     Nitrite, UA Negative        Last BUN and creatinine:  Lab Results   Component Value Date    BUN 8 10/28/2020     Lab Results   Component Value Date    CREATININE 0.49 (L) 10/28/2020       Imaging Reviewed during this Office Visit:   (results were independently reviewed by physician and radiology report verified)    PAST MEDICAL, FAMILY AND SOCIAL HISTORY:  Past Medical History:   Diagnosis Date    Hypertension      No past surgical history on file. No family history on file. Outpatient Medications Marked as Taking for the 12/7/20 encounter (Office Visit) with Mansi Manriquez MD   Medication Sig Dispense Refill    dexamethasone (DECADRON) 1 MG tablet Take 1 tablet by mouth once for 1 dose Take tablet at 11pm the night before your lab draw. 1 tablet 0    hydroCHLOROthiazide (MICROZIDE) 12.5 MG capsule Take 1 capsule by mouth daily 30 capsule 2    amLODIPine (NORVASC) 10 MG tablet Take 1 tablet by mouth daily 30 tablet 2    aspirin 81 MG EC tablet Take 81 mg by mouth daily      naproxen (NAPROSYN) 500 MG tablet Take 1 tablet by mouth 2 times daily (with meals) 20 tablet 0    albuterol sulfate HFA (PROAIR HFA) 108 (90 Base) MCG/ACT inhaler Inhale 2 puffs into the lungs every 4 hours as needed for Wheezing 1 Inhaler 0       Patient has no known allergies. Social History     Tobacco Use   Smoking Status Heavy Tobacco Smoker    Packs/day: 0.50    Types: Cigarettes    Start date: 01/1976   Smokeless Tobacco Never Used       Social History     Substance and Sexual Activity   Alcohol Use Not Currently       REVIEW OF SYSTEMS:  Constitutional: negative  Eyes: negative  Respiratory: negative  Cardiovascular: negative  Gastrointestinal: negative  Genitourinary: negative except for what is in HPI  Musculoskeletal: negative  Skin: negative   Neurological: negative  Hematological/Lymphatic: negative  Psychological: negative    Physical Exam:    This a 69 y.o. female      Vitals:    12/07/20 1031   BP: 118/63   Temp: 97.5 °F (36.4 °C)     Constitutional: Patient in no acute distress. Neuro: Alert and oriented to person, place and time. Psych: mood and affect normal  HEENT negative  Lungs: Respiratory effort is normal  Cardiovascular: Normal peripheral pulses  Abdomen: Soft, non-tender, non-distended with no CVA, flank pain or hepatosplenomegaly. No hernias. Kidneys normal.  Lymphatics: No palpable lymphadenopathy.   Bladder non-tender and not distended. Assessment and Plan      1. Urinary frequency    2. Adrenal nodule ([address]           Plan:      Return in about 6 weeks (around 1/18/2021). Metabolic work-up for bilateral adrenal hyperplasia. Imaging characteristics are not concerning for cancer.          Dr. Tran Song MD

## 2020-12-08 ENCOUNTER — HOSPITAL ENCOUNTER (OUTPATIENT)
Age: 70
Discharge: HOME | End: 2020-12-08
Payer: MEDICARE

## 2020-12-08 LAB
CORTISOL COLLECTION INFO: ABNORMAL
CORTISOL: 1.7 UG/DL (ref 2.7–18.4)

## 2020-12-08 PROCEDURE — 84244 ASSAY OF RENIN: CPT

## 2020-12-08 PROCEDURE — 82088 ASSAY OF ALDOSTERONE: CPT

## 2020-12-08 PROCEDURE — 83835 ASSAY OF METANEPHRINES: CPT

## 2020-12-08 PROCEDURE — 82533 TOTAL CORTISOL: CPT

## 2020-12-08 PROCEDURE — 36415 COLL VENOUS BLD VENIPUNCTURE: CPT

## 2020-12-10 LAB
ALDOSTERONE COMMENT: NORMAL
ALDOSTERONE: 7 NG/DL

## 2020-12-11 LAB
METANEPH/PLASMA INTERP: NORMAL
METANEPHRINE: 0.14 NMOL/L (ref 0–0.49)
NORMETANEPHRINE PLASMA: 0.44 NMOL/L (ref 0–0.89)

## 2020-12-12 LAB
RENIN ACTIVITY: 0.4 NG/ML/HR
RENIN COMMENT: NORMAL

## 2021-01-04 ENCOUNTER — OFFICE VISIT (OUTPATIENT)
Dept: UROLOGY | Age: 71
End: 2021-01-04
Payer: COMMERCIAL

## 2021-01-04 VITALS
HEART RATE: 70 BPM | SYSTOLIC BLOOD PRESSURE: 124 MMHG | DIASTOLIC BLOOD PRESSURE: 70 MMHG | HEIGHT: 64 IN | BODY MASS INDEX: 28.07 KG/M2 | WEIGHT: 164.4 LBS

## 2021-01-04 DIAGNOSIS — E27.8 ADRENAL NODULE (HCC): ICD-10-CM

## 2021-01-04 DIAGNOSIS — R35.0 URINARY FREQUENCY: Primary | ICD-10-CM

## 2021-01-04 LAB
APPEARANCE FLUID: NORMAL
BILIRUBIN, POC: NORMAL
BLOOD URINE, POC: NORMAL
CLARITY, POC: CLEAR
COLOR, POC: YELLOW
GLUCOSE URINE, POC: NORMAL
KETONES, POC: NORMAL
LEUKOCYTE EST, POC: NORMAL
NITRITE, POC: NORMAL
PH, POC: 5.5
PROTEIN, POC: NORMAL
SPECIFIC GRAVITY, POC: 1.03
UROBILINOGEN, POC: 1

## 2021-01-04 PROCEDURE — 99213 OFFICE O/P EST LOW 20 MIN: CPT | Performed by: UROLOGY

## 2021-01-04 PROCEDURE — 81002 URINALYSIS NONAUTO W/O SCOPE: CPT | Performed by: UROLOGY

## 2021-01-04 RX ORDER — DEXAMETHASONE 0.5 MG/1
1 TABLET ORAL ONCE
Qty: 2 TABLET | Refills: 0 | Status: SHIPPED | OUTPATIENT
Start: 2021-01-04 | End: 2021-01-04

## 2021-01-04 NOTE — PROGRESS NOTES
Abdomen: Soft, non-tender, non-distended with no CVA, flank pain or hepatosplenomegaly. No hernias. Kidneys normal.  Lymphatics: No palpable lymphadenopathy. Bladder non-tender and not distended. Assessment and Plan      1. Urinary frequency    2. Adrenal nodule ([address]           Plan:      No follow-ups on file. Imaging characteristics are not concerning for cancer. Metabolic work up negative for adrenal hyperplasia  Follow up in 1 year with repeat metabolic workup.           Dr. Korin Son MD

## 2021-01-14 ENCOUNTER — HOSPITAL ENCOUNTER (OUTPATIENT)
Dept: MAMMOGRAPHY | Age: 71
Discharge: HOME | End: 2021-01-16
Payer: COMMERCIAL

## 2021-01-14 DIAGNOSIS — Z12.31 ENCOUNTER FOR SCREENING MAMMOGRAM FOR BREAST CANCER: ICD-10-CM

## 2021-01-14 DIAGNOSIS — Z78.0 POST-MENOPAUSAL: ICD-10-CM

## 2021-01-14 PROCEDURE — 77080 DXA BONE DENSITY AXIAL: CPT

## 2021-01-14 PROCEDURE — 77063 BREAST TOMOSYNTHESIS BI: CPT

## 2021-01-18 ENCOUNTER — TELEPHONE (OUTPATIENT)
Dept: FAMILY MEDICINE CLINIC | Age: 71
End: 2021-01-18

## 2021-01-18 NOTE — TELEPHONE ENCOUNTER
Patient called stating she hasn't received the Fosamax Rx yet. Her pharmacy didn't have it.    [facility]

## 2021-01-19 DIAGNOSIS — M85.80 OSTEOPENIA, UNSPECIFIED LOCATION: Primary | ICD-10-CM

## 2021-01-19 DIAGNOSIS — R92.8 ABNORMAL MAMMOGRAM: Primary | ICD-10-CM

## 2021-01-19 RX ORDER — ALENDRONATE SODIUM 70 MG/1
70 TABLET ORAL
Qty: 12 TABLET | Refills: 2 | Status: SHIPPED | OUTPATIENT
Start: 2021-01-19 | End: 2021-10-11

## 2021-01-20 NOTE — TELEPHONE ENCOUNTER
Please notify patient the Rx was sent today - please express my apology for the delay as I needed to be away from the office.

## 2021-01-27 ENCOUNTER — HOSPITAL ENCOUNTER (OUTPATIENT)
Dept: MAMMOGRAPHY | Age: 71
Discharge: HOME | End: 2021-01-29
Payer: COMMERCIAL

## 2021-01-27 ENCOUNTER — HOSPITAL ENCOUNTER (OUTPATIENT)
Dept: ULTRASOUND IMAGING | Age: 71
Discharge: HOME | End: 2021-01-29
Payer: COMMERCIAL

## 2021-01-27 DIAGNOSIS — R92.8 ABNORMAL MAMMOGRAM OF RIGHT BREAST: ICD-10-CM

## 2021-01-27 DIAGNOSIS — R92.8 ABNORMAL MAMMOGRAM: ICD-10-CM

## 2021-01-27 DIAGNOSIS — R92.8 ABNORMAL MAMMOGRAM OF LEFT BREAST: ICD-10-CM

## 2021-01-27 PROCEDURE — 76642 ULTRASOUND BREAST LIMITED: CPT

## 2021-01-27 PROCEDURE — G0279 TOMOSYNTHESIS, MAMMO: HCPCS

## 2021-01-28 ENCOUNTER — TELEPHONE (OUTPATIENT)
Dept: FAMILY MEDICINE CLINIC | Age: 71
End: 2021-01-28

## 2021-01-28 DIAGNOSIS — R92.8 ABNORMAL MAMMOGRAM: Primary | ICD-10-CM

## 2021-03-22 ENCOUNTER — HOSPITAL ENCOUNTER (OUTPATIENT)
Dept: GENERAL RADIOLOGY | Age: 71
Discharge: HOME | End: 2021-03-24
Payer: COMMERCIAL

## 2021-03-22 ENCOUNTER — HOSPITAL ENCOUNTER (OUTPATIENT)
Age: 71
Discharge: HOME | End: 2021-03-24
Payer: COMMERCIAL

## 2021-03-22 ENCOUNTER — OFFICE VISIT (OUTPATIENT)
Dept: FAMILY MEDICINE CLINIC | Age: 71
End: 2021-03-22
Payer: COMMERCIAL

## 2021-03-22 VITALS
DIASTOLIC BLOOD PRESSURE: 72 MMHG | OXYGEN SATURATION: 99 % | HEART RATE: 86 BPM | TEMPERATURE: 97.9 F | SYSTOLIC BLOOD PRESSURE: 134 MMHG | BODY MASS INDEX: 28.15 KG/M2 | WEIGHT: 164 LBS

## 2021-03-22 DIAGNOSIS — G89.29 CHRONIC PAIN OF RIGHT KNEE: Primary | ICD-10-CM

## 2021-03-22 DIAGNOSIS — M25.561 CHRONIC PAIN OF RIGHT KNEE: ICD-10-CM

## 2021-03-22 DIAGNOSIS — M25.561 CHRONIC PAIN OF RIGHT KNEE: Primary | ICD-10-CM

## 2021-03-22 DIAGNOSIS — M54.50 CHRONIC BILATERAL LOW BACK PAIN WITHOUT SCIATICA: ICD-10-CM

## 2021-03-22 DIAGNOSIS — G89.29 CHRONIC PAIN OF RIGHT KNEE: ICD-10-CM

## 2021-03-22 DIAGNOSIS — G89.29 CHRONIC BILATERAL LOW BACK PAIN WITHOUT SCIATICA: ICD-10-CM

## 2021-03-22 DIAGNOSIS — I10 ESSENTIAL HYPERTENSION: ICD-10-CM

## 2021-03-22 PROCEDURE — 99214 OFFICE O/P EST MOD 30 MIN: CPT | Performed by: NURSE PRACTITIONER

## 2021-03-22 PROCEDURE — 72100 X-RAY EXAM L-S SPINE 2/3 VWS: CPT

## 2021-03-22 PROCEDURE — 73562 X-RAY EXAM OF KNEE 3: CPT

## 2021-03-22 RX ORDER — DICLOFENAC SODIUM 25 MG/1
25 TABLET, DELAYED RELEASE ORAL 2 TIMES DAILY PRN
Qty: 60 TABLET | Refills: 0 | Status: SHIPPED | OUTPATIENT
Start: 2021-03-22 | End: 2021-04-15

## 2021-03-22 RX ORDER — AMLODIPINE BESYLATE 10 MG/1
10 TABLET ORAL DAILY
Qty: 90 TABLET | Refills: 0 | Status: SHIPPED | OUTPATIENT
Start: 2021-03-22 | End: 2021-06-14

## 2021-03-22 ASSESSMENT — PATIENT HEALTH QUESTIONNAIRE - PHQ9
1. LITTLE INTEREST OR PLEASURE IN DOING THINGS: 0
SUM OF ALL RESPONSES TO PHQ QUESTIONS 1-9: 1
SUM OF ALL RESPONSES TO PHQ QUESTIONS 1-9: 1
SUM OF ALL RESPONSES TO PHQ9 QUESTIONS 1 & 2: 1

## 2021-03-22 NOTE — PROGRESS NOTES
Philly Mason, FNP-C  [address]  [id number] [facility]. [facility] [address] [address]  [phone]  [phone]    Rosalina Root is a 66 y.o. female who is here with c/o of:    Chief Complaint: Knee Pain (right knee), Back Pain, and Leg Pain      Patient Accompanied by: n/a    HPI - Rosalina Root is here today with c/o:    Chronic Back Pain: Pain started 10 years ago and across entire lower back. On average, pain is perceived as severe (6-8 pain scale). Change in quality of symptoms: no.  Associated symptoms: none. She denies weakness, change in gait, paresthesias, saddle anesthesia and new bowel or bladder dysfunction. Current treatment: Aleve bid, which has been  not very effective. Medication side effects: none. Recent diagnostic testing: none - last done in [place]. Knee Pain  Patient presents with pain involving the right knee. Onset of the symptoms was 2006 - torn cartilage with surgical repair. Inciting event: this is a longstanding problem which has been getting worse. Current symptoms include aching pain with grinding noise. Pain is aggravated by going up and down stairs, rising after sitting, standing and walking. Patient has had prior knee problems. Evaluation to date: Ortho consult: 2006 with surgery. Treatment to date: Aleve and this has not been very effective. Patient Active Problem List:     Essential hypertension     Murmur, cardiac     Hyperglycemia     Adrenal nodule (HCC)     Chronic pain of right knee     Nodule of lower lobe of left lung     Past Medical History:   Diagnosis Date    Hypertension       No past surgical history on file. No family history on file.   Social History     Tobacco Use    Smoking status: Heavy Tobacco Smoker     Packs/day: 0.50     Types: Cigarettes     Start date: 01/1976    Smokeless tobacco: Never Used   Substance Use Topics    Alcohol use: Not Currently     ALLERGIES:  No Known Allergies       Subjective     · Constitutional:  Negative for activity change, appetite change,unexpected weight change, chills, fever, and fatigue. · HENT: Negative for ear pain, sore throat,  Rhinorrhea, sinus pain, sinus pressure, congestion. · Eyes:  Negative for pain and discharge. · Respiratory:  Negative for chest tightness, shortness of breath, wheezing, and cough. · Cardiovascular:  Negative for chest pain, palpitations and leg swelling. · Gastrointestinal: Negative for abdominal pain, blood in stool, constipation,diarrhea, nausea and vomiting. · Endocrine: Negative for cold intolerance, heat intolerance, polydipsia, polyphagia and polyuria. · Genitourinary: Negative for difficulty urinating, dysuria, flank pain, frequency, hematuria and urgency. · Musculoskeletal: Negative for arthralgias, joint swelling, myalgias, neck pain and neck stiffness. Positive for back pain, right knee pain  · Skin: Negative for rash and wound. · Allergic/Immunologic: Negative for environmental allergies and food allergies. · Neurological:  Negative for dizziness, light-headedness, numbness and headaches. · Hematological:  Negative for adenopathy. Does not bruise/bleed easily. · Psychiatric/Behavioral: Negative for self-injury, sleep disturbance and suicidal ideas. Objective     PHYSICAL EXAM:   · Constitutional: [place] is oriented to person, place, and time. Vital signs are normal. Appears well-developed and well-nourished. · HEENT:   · Head: Normocephalic and atraumatic. Right Ear: Hearing and external ear normal.     · Left Ear: Hearing and external ear normal.    · Eyes:PERRL, EOMI, Conjunctiva normal, No discharge. · Neck: Full passive range of motion. · Cardiovascular: Normal rate, regular rhythm, S1, S2, no murmur, no gallop, no friction rub, intact distal pulses. · Pulmonary/Chest: Breath sounds are clear throughout, No respiratory distress, No wheezing, No chest tenderness.  Effort normal  ·   · Musculoskeletal:   Knee Effusion:  None. Ecchymosis:  none   Knee ROM:  0 to 45 degrees without subpatellar   crepitance. Patella:  Patella does track normally. Tenderness: none   Stability:  Lachman's test: negative  Posterior drawer: negative  Medial collateral ligament: normal  Lateral collateral ligament: normal     Marjorie Test:  negative   Javier's Test:  negative with no joint line tenderness   Sensation:   intact to light touch   Pulses: normal DP and PT pulses   Back:  - Tenderness to Palpation: none   - Range of Motion:     flexion- diminished range with pain     extension- full range with pain    rotation- diminished range with pain bilaterally    lateral bending-diminished range of motion bilaterally  - Scoliosis is absent. - Hyperkyphosis is absent. Neurological:   - Straight leg raise is negative bilaterally. - Gait is normal.  - Heel walk - deferred - balance issue  - Toe walk - deferred - balance issue.  - Motor function is normal.  - Sensory function is normal.  - Alert and oriented to person, place, and time.   - No focal deficits noted. - She has normal strength. · Skin: Skin is warm, dry and intact. No obvious lesions on exposed skin  · Psychiatric: Normal mood and affect. Speech is normal and behavior is normal.     Nursing note and vitals reviewed. Blood pressure 134/72, pulse 86, temperature 97.9 °F (36.6 °C), temperature source Temporal, weight 164 lb (74.4 kg), SpO2 99 %. Body mass index is 28.15 kg/m². Wt Readings from Last 3 Encounters:   03/22/21 164 lb (74.4 kg)   01/04/21 164 lb 6.4 oz (74.6 kg)   12/07/20 163 lb (73.9 kg)     BP Readings from Last 3 Encounters:   03/22/21 134/72   01/04/21 124/70   12/07/20 118/63       No results found for this visit on 03/22/21.     Completed Orders/Prescriptions   Orders Placed This Encounter   Medications    diclofenac (VOLTAREN) 25 MG EC tablet     Sig: Take 1 tablet by mouth 2 times daily as needed for Pain     Dispense:  60 tablet     Refill:  0    amLODIPine (NORVASC) 10 MG tablet     Sig: Take 1 tablet by mouth daily     Dispense:  90 tablet     Refill:  0               AssessmentPlan/Medical Decision Making     1. Chronic pain of right knee  - further plan of care pending dx imaging  - XR KNEE RIGHT (3 VIEWS); Future  - diclofenac (VOLTAREN) 25 MG EC tablet; Take 1 tablet by mouth 2 times daily as needed for Pain  Dispense: 60 tablet; Refill: [address] DO, Orthopedic Surgery, [facility]    2. Chronic bilateral low back pain without sciatica  - further plan of care pending dx imaging  - XR LUMBAR SPINE (2-3 VIEWS); Future  - diclofenac (VOLTAREN) 25 MG EC tablet; Take 1 tablet by mouth 2 times daily as needed for Pain  Dispense: 60 tablet; Refill: [address] Yousif DO, Orthopedic Surgery, [facility]    3. Essential hypertension  - controlled  - refill encounter only  - amLODIPine (NORVASC) 10 MG tablet; Take 1 tablet by mouth daily  Dispense: 90 tablet; Refill: 0      Return in about 1 month (around 4/22/2021). 1.  Nabil Woodward received counseling on the following healthy behaviors: nutrition, exercise and medication adherence  2. Patient given educational materials - see patient instructions  3. Was a self-tracking handout given in paper form or via [employer]? No  If yes, see orders or list here. 4.  Discussed use, benefit, and side effects of prescribed medications. Barriers to medication compliance addressed. All patient questions answered. Pt voiced understanding. 5.  Reviewed prior labs, imaging, consultation, follow up, and health maintenance  6. Continue current medications, diet and exercise. 7. Discussed use, benefit, and side effects of prescribed medications. Barriers to medication compliance addressed. All her questions were answered. Pt voiced understanding. Nabil Woodward will continue current medications, diet and exercise.       Of the 30 minute duration appointment visit, Miguel Angel Price CNP spent at least 50% of the face-to-face time in counseling, explanation of diagnosis, planning of further management, and answering all questions. Signed:  Von Delacruz CNP    This note is created with the assistance of a speech-recognition program.  While intending to generate a document that actually reflects the content of the visit, no guarantees can be provided that every mistake has been identified and corrected by editing.

## 2021-03-29 ENCOUNTER — OFFICE VISIT (OUTPATIENT)
Dept: ORTHOPEDIC SURGERY | Age: 71
End: 2021-03-29
Payer: COMMERCIAL

## 2021-03-29 VITALS
RESPIRATION RATE: 13 BRPM | SYSTOLIC BLOOD PRESSURE: 133 MMHG | BODY MASS INDEX: 27.83 KG/M2 | TEMPERATURE: 97 F | HEIGHT: 64 IN | WEIGHT: 163 LBS | DIASTOLIC BLOOD PRESSURE: 64 MMHG | HEART RATE: 81 BPM

## 2021-03-29 DIAGNOSIS — M25.561 ACUTE PAIN OF RIGHT KNEE: ICD-10-CM

## 2021-03-29 DIAGNOSIS — M17.31 POST-TRAUMATIC OSTEOARTHRITIS OF RIGHT KNEE: Primary | ICD-10-CM

## 2021-03-29 PROCEDURE — 99204 OFFICE O/P NEW MOD 45 MIN: CPT | Performed by: ORTHOPAEDIC SURGERY

## 2021-03-29 ASSESSMENT — ENCOUNTER SYMPTOMS
CHEST TIGHTNESS: 0
CONSTIPATION: 0
SHORTNESS OF BREATH: 0
ABDOMINAL DISTENTION: 0
APNEA: 0
NAUSEA: 0
VOMITING: 0
COLOR CHANGE: 0
DIARRHEA: 0
COUGH: 0
ABDOMINAL PAIN: 0

## 2021-03-29 NOTE — PROGRESS NOTES
[facility] [address] [facility]  [address] [place] [id number]  Dept: [phone]  Dept Fax: [phone]          Right Knee - New Patient     Subjective:     Chief Complaint   Patient presents with    New Patient     Right Knee Pain     HPI:     Jh Merlos who is disabled, presents today for Right knee pain. The pain has been present for 14 years. The patient recalls a specific injury where the knee got torqued after she slipped and fell when she was bowling in 2006. After falling, she states that she found out that she tore her meniscus and her ACL. The patient has tried rest, Aleve BID, heat and a brace with no improvement. The pain is now described as Achy and Dull. There is pain on weight bearing. The knee has swelled. There is painful popping and clicking. The knee has not caught or locked up. The knee has not given out. It is stiff upon arising from sitting. It is painful to go up and down stairs and sit for a prolonged time. The patient has not had a cortisone injection. The patient has not tried a lubrication injection. The patient has not tried physical therapy. The patient has had surgery in 2006 and she states that it was right knee arthroscopic surgery with ACL reconstruction. She states that she cannot recall what hospital it was done at but she knows that she moved to [place] right after she had the ACL reconstructed. Patient states that her knee feels like \"someone grinding their teeth. \" She also states that her right foot is sensitive to anything bumping it because she may be having a gout flair in it. ROS:   Review of Systems   Constitutional: Positive for activity change. Negative for appetite change, fatigue and fever. Respiratory: Negative for apnea, cough, chest tightness and shortness of breath. Cardiovascular: Negative for chest pain, palpitations and leg swelling.    Gastrointestinal: Negative for abdominal distention, abdominal pain, constipation, diarrhea, nausea and vomiting. Genitourinary: Negative for difficulty urinating, dysuria and hematuria. Musculoskeletal: Positive for arthralgias. Negative for gait problem, joint swelling and myalgias. Skin: Negative for color change and rash. Neurological: Negative for dizziness, weakness, numbness and headaches. Psychiatric/Behavioral: Negative for sleep disturbance. Past Medical History:    Past Medical History:   Diagnosis Date    Hypertension      Past Surgical History:    No past surgical history on file. Current Medications:   Current Outpatient Medications   Medication Sig Dispense Refill    hydroCHLOROthiazide (HYDRODIURIL) 12.5 MG tablet Take 1 tablet by mouth daily 90 tablet 0    diclofenac (VOLTAREN) 25 MG EC tablet Take 1 tablet by mouth 2 times daily as needed for Pain 60 tablet 0    amLODIPine (NORVASC) 10 MG tablet Take 1 tablet by mouth daily 90 tablet 0    alendronate (FOSAMAX) 70 MG tablet Take 1 tablet by mouth every 7 days 12 tablet 2    aspirin 81 MG EC tablet Take 81 mg by mouth daily      albuterol sulfate HFA (PROAIR HFA) 108 (90 Base) MCG/ACT inhaler Inhale 2 puffs into the lungs every 4 hours as needed for Wheezing 1 Inhaler 0     No current facility-administered medications for this visit. Allergies:    Patient has no known allergies.     Social History:   Social History     Socioeconomic History    Marital status: Single     Spouse name: None    Number of children: None    Years of education: None    Highest education level: None   Occupational History    None   Social Needs    Financial resource strain: Not hard at all   [employer] insecurity     Worry: Never true     Inability: Never true   [employer] needs     Medical: None     Non-medical: None   Tobacco Use    Smoking status: Heavy Tobacco Smoker     Packs/day: 0.50     Types: Cigarettes     Start date: 01/1976    Smokeless tobacco: Never Used instability at 30 degrees. There is No valgus instability at 0 degrees and No valgus instability at 30 degrees. SPECIAL: Javier test is positive with with no clunks or crepitation but there is popping and pain. PALP: There is medial joint line pain. FOOT AND ANKLE EXAM    LOC: Right Foot & Ankle  GEN: Alert and oriented X 3 and in no acute distress. The patient's gait was observed and noted to be antalgic. SKIN: Intact without rashes, lesions, or ulcerations. Nails are not dystrophic. NEURO: Sensation to the foot is intact. VASC: Capillary refill is less than three seconds. Distal pulses are are palpable. There is no lymphadenopathy. INSPECTION:  Reveals no effusion, swelling is absent, edema is absent, ecchymoses is absent. ROM: Foot supination and pronation are full and non-painful. PALP: No pain to palpation over the great toe. Assessment:     1. Post-traumatic osteoarthritis of right knee    2. Acute pain of right knee      Procedures:    Procedure: no  Radiology:   KNEE - ACL X-RAY    4 views of the right knee including AP, bilateral tunnel, and lateral in the upright position, and skyline views reveal valgus alignment with no fracture or dislocation. Kellgren grade IV changes of osteoarthritis (joint space narrowing, osteophyte, subchondral sclerosis, bony deformity/cyst) of the lateral compartment(s). No osseous loose bodies. No bony erosion or periosteal reaction. No soft tissue masses. The ACL tunnels are noted in appropriate position. The hardware is intact without signs of complication. Impression: Severe traumatic/osteoarthritis of the right knee. Plan:   Treatment : I reviewed the X-ray with the patient and I informed her that the knee has reached bone on bone in the lateral compartment and that also means she is at a Kellgren grade IV. We discussed the etiologies and natural histories of Primary osteoarthritis of the right knee.  We discussed the various treatment alternatives including anti-inflammatory medications, physical therapy, injections, further imaging studies and as a last result surgery. During today's visit, I explained to the patient that the lateral aspect of her knee has joint space narrowing that is so bad down to the point that the bones are touching and that is the grinding sensation that she is feeling when she is bending her knee. From there, I explained to the patient that she can try NSAID's at a prescription dose to help decrease the inflammation that she has in her knee, she can try physical therapy to help strengthen the small muscles around the knee but it may work like a double edged sword and she may also try a cortisone injections or lubricating injections to help her get good pain relief temporarily. But overall, the only thing that will definitively give her long term pain relief is a total knee replacement. I then asked the patient if she is in enough pain to consider a total knee replacement and she stated that she is willing to do it at this point because her pain is unbearable and she is fearful of needles. I then explained to her that we can try the injection but if we do the injection, she will have to wait three months before we can do the surgery due to the increased risk of infection. The patient then informed me that she is taking bone building medicines for osteoporosis. I then asked the patient if she wants to do the injection or schedule the surgery and she stated that she wants to schedule the surgery. I then told her that we will get the ball rolling to improve her quality of life. I then asked her if she has stairs at home and she stated that she does. I also asked her if she lives on her own and she stated that she lives with her sister. I then told her that I will have her meet with my surgery scheduler and I will also give her simple exercises.  So at this time, the patient has opted for and and has elected to proceed with a right total knee replacement surgery. The risks/benefits/alternatives and potential complications have been discussed with the patient. We also had a long discussion regarding the procedure itself and the expectation of the recovery. All questions and concerns were addressed. A consent was signed today. Patient was instructed to call our office with any question or concerns prior to the procedure occurs. Patient should return to the clinic 1 week before surgery to follow up with Ronal Barajas PA-C for a pre-operative discussion. The patient will call the office immediately with any problems. No orders of the defined types were placed in this encounter. Orders Placed This Encounter   Procedures    XR KNEE RIGHT (MIN 4 VIEWS)     Standing Status:   Future     Number of Occurrences:   1     Standing Expiration Date:   3/29/2022     Order Specific Question:   Reason for exam:     Answer:   knee pain     I, Enriqueward Day V, am scribing for and in the presence of Minesh Postal D.O. 3/29/2021  7:58 PM      I, Minesh Postal , have personally seen this patient and I have reviewed the CC, PMH, FHX and Social History as provided by other clinical staff. I reassessed the HPI and ROS as scribed by Harvinder Nick Scribdaquan in my presence and it is both accurate and complete. Thereafter, I personally performed the PE, reviewed the imaging and established the DX and POC. I agree with the documentation provided by the Medical Scribe. I have reviewed all documentation in its entirety prior to providing my signature indicating agreement. Any areas of disagreement are noted on the chart. Electronically signed by Khang Angela DO on 3/29/2021 at 7:59 PM        I spent 47 minutes of face to face time with this patient.       Electronically signed by Khang Angela DO, on 3/29/2021 at 7:58 PM

## 2021-04-05 ENCOUNTER — TELEPHONE (OUTPATIENT)
Dept: GASTROENTEROLOGY | Age: 71
End: 2021-04-05

## 2021-04-05 NOTE — TELEPHONE ENCOUNTER
Patient referred to Dr. Jenn Sylvester for colon screening. Mailed letter requesting patient call the office to schedule. 1st attempt.

## 2021-04-15 DIAGNOSIS — G89.29 CHRONIC PAIN OF RIGHT KNEE: ICD-10-CM

## 2021-04-15 DIAGNOSIS — G89.29 CHRONIC BILATERAL LOW BACK PAIN WITHOUT SCIATICA: ICD-10-CM

## 2021-04-15 DIAGNOSIS — M25.561 CHRONIC PAIN OF RIGHT KNEE: ICD-10-CM

## 2021-04-15 DIAGNOSIS — M54.50 CHRONIC BILATERAL LOW BACK PAIN WITHOUT SCIATICA: ICD-10-CM

## 2021-04-15 RX ORDER — DICLOFENAC SODIUM 25 MG/1
25 TABLET, DELAYED RELEASE ORAL 2 TIMES DAILY PRN
Qty: 60 TABLET | Refills: 0 | Status: SHIPPED | OUTPATIENT
Start: 2021-04-15

## 2021-04-15 NOTE — TELEPHONE ENCOUNTER
Debbie Ford is calling to request a refill on the following medication(s):    Medication Request:  Requested Prescriptions     Pending Prescriptions Disp Refills    diclofenac (VOLTAREN) 25 MG EC tablet [Pharmacy Med Name: DICLOFENAC SOD EC 25 MG TAB] 60 tablet 0     Sig: TAKE 1 TABLET BY MOUTH 2 TIMES DAILY AS NEEDED FOR PAIN       Last Visit Date (If Applicable):  0/08/4574 In office    Next Visit Date:    Visit date not found

## 2021-06-09 ENCOUNTER — TELEPHONE (OUTPATIENT)
Dept: FAMILY MEDICINE CLINIC | Age: 71
End: 2021-06-09

## 2021-08-03 ENCOUNTER — HOSPITAL ENCOUNTER (OUTPATIENT)
Dept: ULTRASOUND IMAGING | Age: 71
Discharge: HOME | End: 2021-08-05
Payer: COMMERCIAL

## 2021-08-03 DIAGNOSIS — R92.8 ABNORMAL MAMMOGRAM: ICD-10-CM

## 2021-08-03 PROCEDURE — 76642 ULTRASOUND BREAST LIMITED: CPT

## 2021-12-03 DIAGNOSIS — I10 ESSENTIAL HYPERTENSION: ICD-10-CM

## 2021-12-03 RX ORDER — AMLODIPINE BESYLATE 10 MG/1
TABLET ORAL
Qty: 90 TABLET | Refills: 0 | OUTPATIENT
Start: 2021-12-03

## 2021-12-03 NOTE — LETTER
Blayne , APRN - CNP  [facility]  [address] [address]  Dept: [phone]    12/6/2021    Swati Vital  [address] [address] [place] [address]      Dear Swati Vital    In addition to helping you feel better when you are sick, we are interested in preventing illness and injury in the first place. In the spirit of maintaining your good health, your record indicates that you are due for an appointment.        Please call [phone] to schedule     I look forward to seeing you soon     Sincerely,       [facility]

## 2021-12-03 NOTE — TELEPHONE ENCOUNTER
Clyde Friedman is calling to request a refill on the following medication(s):    Medication Request:  Requested Prescriptions     Pending Prescriptions Disp Refills    amLODIPine (NORVASC) 10 MG tablet [Pharmacy Med Name: AMLODIPINE BESYLATE 10 MG TAB] 90 tablet 0     Sig: TAKE 1 TABLET BY MOUTH EVERY DAY       Last Visit Date (If Applicable):  5/85/6841 In office    Next Visit Date:    Visit date not found

## 2022-01-10 DIAGNOSIS — I10 ESSENTIAL HYPERTENSION: ICD-10-CM

## 2022-01-10 NOTE — TELEPHONE ENCOUNTER
Be Camilo is calling to request a refill on the following medication(s):    Medication Request:  Requested Prescriptions     Pending Prescriptions Disp Refills    amLODIPine (NORVASC) 10 MG tablet [Pharmacy Med Name: AMLODIPINE BESYLATE 10 MG TAB] 90 tablet 0     Sig: TAKE 1 TABLET BY MOUTH EVERY DAY    hydroCHLOROthiazide (HYDRODIURIL) 12.5 MG tablet [Pharmacy Med Name: HYDROCHLOROTHIAZIDE 12.5 MG TB] 90 tablet 0     Sig: TAKE 1 TABLET BY MOUTH EVERY DAY       Last Visit Date (If Applicable):  1/25/6111    Next Visit Date:    1/12/2022

## 2022-01-11 RX ORDER — HYDROCHLOROTHIAZIDE 12.5 MG/1
TABLET ORAL
Qty: 90 TABLET | Refills: 0 | Status: SHIPPED | OUTPATIENT
Start: 2022-01-11 | End: 2022-01-12 | Stop reason: SDUPTHER

## 2022-01-11 RX ORDER — AMLODIPINE BESYLATE 10 MG/1
TABLET ORAL
Qty: 90 TABLET | Refills: 0 | Status: SHIPPED | OUTPATIENT
Start: 2022-01-11 | End: 2022-01-12 | Stop reason: SDUPTHER

## 2022-01-12 ENCOUNTER — OFFICE VISIT (OUTPATIENT)
Dept: FAMILY MEDICINE CLINIC | Age: 72
End: 2022-01-12
Payer: MEDICARE

## 2022-01-12 VITALS
BODY MASS INDEX: 28.49 KG/M2 | SYSTOLIC BLOOD PRESSURE: 164 MMHG | DIASTOLIC BLOOD PRESSURE: 82 MMHG | HEART RATE: 62 BPM | WEIGHT: 166 LBS | TEMPERATURE: 97.4 F | OXYGEN SATURATION: 97 %

## 2022-01-12 DIAGNOSIS — Z13.220 SCREENING CHOLESTEROL LEVEL: ICD-10-CM

## 2022-01-12 DIAGNOSIS — E27.8 ADRENAL NODULE (HCC): ICD-10-CM

## 2022-01-12 DIAGNOSIS — Z87.891 PERSONAL HISTORY OF TOBACCO USE, PRESENTING HAZARDS TO HEALTH: ICD-10-CM

## 2022-01-12 DIAGNOSIS — M85.80 OSTEOPENIA, UNSPECIFIED LOCATION: ICD-10-CM

## 2022-01-12 DIAGNOSIS — I10 ESSENTIAL HYPERTENSION: Primary | ICD-10-CM

## 2022-01-12 PROCEDURE — 99406 BEHAV CHNG SMOKING 3-10 MIN: CPT | Performed by: NURSE PRACTITIONER

## 2022-01-12 PROCEDURE — 99214 OFFICE O/P EST MOD 30 MIN: CPT | Performed by: NURSE PRACTITIONER

## 2022-01-12 RX ORDER — AMLODIPINE BESYLATE 10 MG/1
10 TABLET ORAL DAILY
Qty: 90 TABLET | Refills: 0 | Status: SHIPPED | OUTPATIENT
Start: 2022-01-12 | End: 2022-07-22

## 2022-01-12 RX ORDER — ALENDRONATE SODIUM 70 MG/1
70 TABLET ORAL
Qty: 12 TABLET | Refills: 0 | Status: SHIPPED | OUTPATIENT
Start: 2022-01-12

## 2022-01-12 RX ORDER — HYDROCHLOROTHIAZIDE 12.5 MG/1
12.5 TABLET ORAL DAILY
Qty: 90 TABLET | Refills: 0 | Status: SHIPPED | OUTPATIENT
Start: 2022-01-12

## 2022-01-12 ASSESSMENT — PATIENT HEALTH QUESTIONNAIRE - PHQ9
SUM OF ALL RESPONSES TO PHQ9 QUESTIONS 1 & 2: 2
1. LITTLE INTEREST OR PLEASURE IN DOING THINGS: 0
2. FEELING DOWN, DEPRESSED OR HOPELESS: 2
SUM OF ALL RESPONSES TO PHQ QUESTIONS 1-9: 2

## 2022-01-12 NOTE — PROGRESS NOTES
FABIEN Priest-C  [address]  [address] [address] [facility]. Radha Houston  [address]  [phone]  [phone]    Mary Deal is a 66 y.o. female who is here with c/o of:    Chief Complaint: Medication Check      Patient Accompanied by: n/a    HPI - Mary Deal is here today with c/o:    Adrenal Nodule   - [address] review   - pt reports f/u with urology for this problem    HTN   - she is not compliant with low sodium diet and admits she likes to eat junk food and is not physically active   - she continues to smoke and states she is not going to stop   - she does c/o palpitations, chest pressure and has known murmur   - she denies h/a, lower extremity edema   - current medications: HCTZ 12.5mg daily, amlodipine 10mg daily and has not taken for the past 4 days as she was out of her medication    Lab Results   Component Value Date    LABA1C 5.4 11/06/2020     Lab Results   Component Value Date    CREATININE 0.49 (L) 10/28/2020     Murmur   - patient reports murmur of several years and denies consultation with cardiology or previous Echo      Patient Active Problem List    Diagnosis Date Noted    Essential hypertension 11/06/2020    Murmur, cardiac 11/06/2020    Hyperglycemia 11/06/2020    Adrenal nodule ([facility] [address] 11/06/2020    Chronic pain of right knee 11/06/2020    Nodule of lower lobe of left lung 11/06/2020       Past Medical History:   Diagnosis Date    Hypertension       No past surgical history on file. No family history on file. Social History     Tobacco Use    Smoking status: Heavy Tobacco Smoker     Packs/day: 0.50     Types: Cigarettes     Start date: 01/1976    Smokeless tobacco: Never Used   Substance Use Topics    Alcohol use: Not Currently     ALLERGIES:  No Known Allergies       Subjective     · Constitutional:  Negative for activity change, appetite change,unexpected weight change, chills, fever, and fatigue.     · HENT: Negative for ear pain, sore throat,  Rhinorrhea, sinus pain, sinus pressure, congestion. · Eyes:  Negative for pain and discharge. · Respiratory:  Negative for chest tightness, shortness of breath, wheezing, and cough. · Cardiovascular:  Negative for chest pain, palpitations and leg swelling. · Gastrointestinal: Negative for abdominal pain, blood in stool, constipation,diarrhea, nausea and vomiting. · Endocrine: Negative for cold intolerance, heat intolerance, polydipsia, polyphagia and polyuria. · Genitourinary: Negative for difficulty urinating, dysuria, frequency, hematuria and urgency. Positive for right flank pain  · Musculoskeletal: Negative for arthralgias, back pain, joint swelling, myalgias, neck pain and neck stiffness. Positive for right knee pain  · Skin: Negative for rash and wound. · Allergic/Immunologic: Negative for environmental allergies and food allergies. · Neurological:  Negative for dizziness, light-headedness, numbness and headaches. · Hematological:  Negative for adenopathy. Does not bruise/bleed easily. · Psychiatric/Behavioral: Negative for self-injury, sleep disturbance and suicidal ideas. Objective     PHYSICAL EXAM:   · Constitutional: Aishwarya Alvarez is oriented to person, place, and time. Vital signs are normal. Appears well-developed and well-nourished. · HEENT:   · Head: Normocephalic and atraumatic. Right Ear: Hearing and external ear normal.   · Left Ear: Hearing and external ear normal.   · Eyes:PERRL, EOMI, Conjunctiva normal, No discharge. · Neck: Full passive range of motion. Non-tender on palpation. Neck supple. No thyromegaly present. Trachea normal.  · Cardiovascular: Normal rate, regular rhythm, S1, S2, Grade IV murmur, no gallop, no friction rub, intact distal pulses. · Pulmonary/Chest: Breath sounds are clear throughout, No respiratory distress, No wheezing, No chest tenderness. Effort normal  · Musculoskeletal: Extremities appear regular and symmetric.  No evident masses, lesions, foreign bodies, or other abnormalities. No edema. No tenderness on palpation. Joints are stable. Full ROM, strength and tone are within normal limits. · Lymphadenopathy: No lymphadenopathy noted. · Neurological: Alert and oriented to person, place, and time. Normal motor function, Normal sensory function, No focal deficits noted. He has normal strength. · Skin: Skin is warm, dry and intact. No obvious lesions on exposed skin  · Psychiatric: Normal mood and affect. Speech is normal and behavior is normal.     Nursing note and vitals reviewed. Blood pressure (!) 164/82, pulse 62, temperature 97.4 °F (36.3 °C), temperature source Temporal, weight 166 lb (75.3 kg), SpO2 97 %. Body mass index is 28.49 kg/m². Wt Readings from Last 3 Encounters:   01/12/22 166 lb (75.3 kg)   03/29/21 163 lb (73.9 kg)   03/22/21 164 lb (74.4 kg)     BP Readings from Last 3 Encounters:   01/12/22 (!) 164/82   03/29/21 133/64   03/22/21 134/72       No results found for this visit on 01/12/22. Completed Orders/Prescriptions   Orders Placed This Encounter   Medications    amLODIPine (NORVASC) 10 MG tablet     Sig: Take 1 tablet by mouth daily     Dispense:  90 tablet     Refill:  0    hydroCHLOROthiazide (HYDRODIURIL) 12.5 MG tablet     Sig: Take 1 tablet by mouth daily     Dispense:  90 tablet     Refill:  0    alendronate (FOSAMAX) 70 MG tablet     Sig: Take 1 tablet by mouth every 7 days     Dispense:  12 tablet     Refill:  0               AssessmentPlan/Medical Decision Making     1. Essential hypertension  - not controlled  - stressed the importance of routine f/u and medication compliance  - she will need to have labs completed  - strongly encouraged smoking cessation despite her statement of refusal  - CBC With Auto Differential; Future  - Comprehensive Metabolic Panel; Future  - amLODIPine (NORVASC) 10 MG tablet; Take 1 tablet by mouth daily  Dispense: 90 tablet;  Refill: 0  - hydroCHLOROthiazide (HYDRODIURIL) 12.5 MG tablet; Take 1 tablet by mouth daily  Dispense: 90 tablet; Refill: 0    2. Adrenal nodule ([address]  - f/u with urology as scheduled    3. Personal history of tobacco use, presenting hazards to health  - Tobacco Cessation Counseling: Patient advised about behavior change, including information about personal health harms, usage of appropriate cessation measures and benefits of cessation. Time spent (minutes): 3  - UT TOBACCO USE CESSATION INTERMEDIATE 3-10 MINUTES [63365]    4. Osteopenia, unspecified location  - alendronate (FOSAMAX) 70 MG tablet; Take 1 tablet by mouth every 7 days  Dispense: 12 tablet; Refill: 0    5. Screening cholesterol level  - Lipid Panel; Future      Return for 1 week ma visit - 6 months for me. 1.  Malorie Gutierrez received counseling on the following healthy behaviors: nutrition, exercise, medication adherence and tobacco cessation  2. Patient given educational materials - see patient instructions  3. Was a self-tracking handout given in paper form or via [employer]? No  If yes, see orders or list here. 4.  Discussed use, benefit, and side effects of prescribed medications. Barriers to medication compliance addressed. All patient questions answered. Pt voiced understanding. 5.  Reviewed prior labs, imaging, consultation, follow up, and health maintenance  6. Continue current medications, diet and exercise. 7. Discussed use, benefit, and side effects of prescribed medications. Barriers to medication compliance addressed. All her questions were answered. Pt voiced understanding. Malorie Gutierrez will continue current medications, diet and exercise. Patient given educational materials on smoking cessation    Of the 25 minute duration appointment visit, Chika Austin CNP spent at least 50% of the face-to-face time in counseling, explanation of diagnosis, planning of further management, and answering all questions.     Signed:  Chika Austin CNP    This note is created with the assistance of abdullahi speech-recognition program.  While intending to generate a document that actually reflects the content of the visit, no guarantees can be provided that every mistake has been identified and corrected by editing.

## 2022-01-12 NOTE — PATIENT INSTRUCTIONS
Stopping Smoking: Care Instructions  Your Care Instructions     Cigarette smokers crave the nicotine in cigarettes. Giving it up is much harder than simply changing a habit. Your body has to stop craving the nicotine. It is hard to quit, but you can do it. There are many tools that people use to quit smoking. You may find that combining tools works best for you. There are several steps to quitting. First you get ready to quit. Then you get support to help you. After that, you learn new skills and behaviors to become a nonsmoker. For many people, a necessary step is getting and using medicine. Your doctor will help you set up the plan that best meets your needs. You may want to attend a smoking cessation program to help you quit smoking. When you choose a program, look for one that has proven success. Ask your doctor for ideas. You will greatly increase your chances of success if you take medicine as well as get counseling or join a cessation program.  Some of the changes you feel when you first quit tobacco are uncomfortable. Your body will miss the nicotine at first, and you may feel short-tempered and grumpy. You may have trouble sleeping or concentrating. Medicine can help you deal with these symptoms. You may struggle with changing your smoking habits and rituals. The last step is the tricky one: Be prepared for the smoking urge to continue for a time. This is a lot to deal with, but keep at it. You will feel better. Follow-up care is a key part of your treatment and safety. Be sure to make and go to all appointments, and call your doctor if you are having problems. It's also a good idea to know your test results and keep a list of the medicines you take. How can you care for yourself at home? · Ask your family, friends, and coworkers for support. You have a better chance of quitting if you have help and support.   · Join a support group, such as Nicotine Anonymous, for people who are trying to quit smoking. · Consider signing up for a smoking cessation program, such as the American Lung Association's Freedom from Smoking program.  · Get text messaging support. Go to the website at www.smokefree. gov to sign up for the [facility] program.  · Set a quit date. Pick your date carefully so that it is not right in the middle of a big deadline or stressful time. Once you quit, do not even take a puff. Get rid of all ashtrays and lighters after your last cigarette. Clean your house and your clothes so that they do not smell of smoke. · Learn how to be a nonsmoker. Think about ways you can avoid those things that make you reach for a cigarette. ? Avoid situations that put you at greatest risk for smoking. For some people, it is hard to have a drink with friends without smoking. For others, they might skip a coffee break with coworkers who smoke. ? Change your daily routine. Take a different route to work or eat a meal in a different place. · Cut down on stress. Calm yourself or release tension by doing an activity you enjoy, such as reading a book, taking a hot bath, or gardening. · Talk to your doctor or pharmacist about nicotine replacement therapy, which replaces the nicotine in your body. You still get nicotine but you do not use tobacco. Nicotine replacement products help you slowly reduce the amount of nicotine you need. These products come in several forms, many of them available over-the-counter:  ? Nicotine patches  ? Nicotine gum and lozenges  ? Nicotine inhaler  · Ask your doctor about bupropion (Wellbutrin) or varenicline (Chantix), which are prescription medicines. They do not contain nicotine. They help you by reducing withdrawal symptoms, such as stress and anxiety. · Some people find hypnosis, acupuncture, and massage helpful for ending the smoking habit. · Eat a healthy diet and get regular exercise. Having healthy habits will help your body move past its craving for nicotine.   · Be prepared to keep trying. Most people are not successful the first few times they try to quit. Do not get mad at yourself if you smoke again. Make a list of things you learned and think about when you want to try again, such as next week, next month, or next year. Where can you learn more? Go to https://chpesohailewrachel.healthTaumatropo Animation. org and sign in to your [employer] account. Enter C076 in the [employer] box to learn more about \"Stopping Smoking: Care Instructions. \"     If you do not have an account, please click on the \"Sign Up Now\" link. Current as of: February 11, 2021               Content Version: 13.1  © 2006-2021 [employer]. Care instructions adapted under license by [facility] ([facility]). If you have questions about a medical condition or this instruction, always ask your healthcare professional. [place] [address] any warranty or liability for your use of this information. Learning About Benefits From Quitting Smoking  How does quitting smoking make you healthier? If you're thinking about quitting smoking, you may have a few reasons to be smoke-free. Your health may be one of them. · When you quit smoking, you lower your risks for cancer, lung disease, heart attack, stroke, blood vessel disease, and blindness from macular degeneration. · When you're smoke-free, you get sick less often, and you heal faster. You are less likely to get colds, flu, bronchitis, and pneumonia. · As a nonsmoker, you may find that your mood is better and you are less stressed. When and how will you feel healthier? Quitting has real health benefits that start from day 1 of being smoke-free. And the longer you stay smoke-free, the healthier you get and the better you feel. The first hours  · After just 20 minutes, your blood pressure and heart rate go down. That means there's less stress on your heart and blood vessels.   · Within 12 hours, the level of carbon monoxide in your blood drops back to normal. That makes room for more oxygen. With more oxygen in your body, you may notice that you have more energy than when you smoked. After 2 weeks  · Your lungs start to work better. · Your risk of heart attack starts to drop. After 1 month  · When your lungs are clear, you cough less and breathe deeper, so it's easier to be active. · Your sense of taste and smell return. That means you can enjoy food more than you have since you started smoking. Over the years  · Over the years, your risks of heart disease, heart attack, and stroke are lower. · After 10 years, your risk of dying from lung cancer is cut by about half. And your risk for many other types of cancer is lower too. How would quitting help others in your life? When you quit smoking, you improve the health of everyone who now breathes in your smoke. · Their heart, lung, and cancer risks drop, much like yours. · They are sick less. For babies and small children, living smoke-free means they're less likely to have ear infections, pneumonia, and bronchitis. · If you're a woman who is or will be pregnant someday, quitting smoking means a healthier . · Children who are close to you are less likely to become adult smokers. Where can you learn more? Go to https://Kiwi Crate.[employer]ee[employer]. org and sign in to your [employer] account. Enter [phone] in the [facility] box to learn more about \"Learning About Benefits From Quitting Smoking. \"     If you do not have an account, please click on the \"Sign Up Now\" link. Current as of: 2021               Content Version: 13.1  © 1740-5468 Healthwise, Incorporated. Care instructions adapted under license by [facility] ([facility]). If you have questions about a medical condition or this instruction, always ask your healthcare professional. [place] [address] any warranty or liability for your use of this information.

## 2022-01-26 ENCOUNTER — NURSE ONLY (OUTPATIENT)
Dept: FAMILY MEDICINE CLINIC | Age: 72
End: 2022-01-26

## 2022-01-26 VITALS — DIASTOLIC BLOOD PRESSURE: 72 MMHG | SYSTOLIC BLOOD PRESSURE: 130 MMHG

## 2022-01-26 DIAGNOSIS — I10 ESSENTIAL HYPERTENSION: Primary | ICD-10-CM

## 2022-01-26 PROCEDURE — 99999 PR OFFICE/OUTPT VISIT,PROCEDURE ONLY: CPT | Performed by: NURSE PRACTITIONER

## 2022-04-22 ENCOUNTER — TELEPHONE (OUTPATIENT)
Dept: FAMILY MEDICINE CLINIC | Age: 72
End: 2022-04-22

## 2022-07-22 DIAGNOSIS — I10 ESSENTIAL HYPERTENSION: ICD-10-CM

## 2022-07-22 RX ORDER — AMLODIPINE BESYLATE 10 MG/1
TABLET ORAL
Qty: 90 TABLET | Refills: 0 | Status: SHIPPED | OUTPATIENT
Start: 2022-07-22

## 2022-07-22 NOTE — TELEPHONE ENCOUNTER
Kulwinder Kenney is calling to request a refill on the following medication(s):    Medication Request:  Requested Prescriptions     Pending Prescriptions Disp Refills    amLODIPine (NORVASC) 10 MG tablet [Pharmacy Med Name: AMLODIPINE BESYLATE 10 MG TAB] 90 tablet 0     Sig: TAKE 1 TABLET BY MOUTH EVERY DAY       Last Visit Date (If Applicable):  0/05/2642 In office    Next Visit Date:    Visit date not found

## 2022-07-25 ENCOUNTER — OFFICE VISIT (OUTPATIENT)
Dept: FAMILY MEDICINE CLINIC | Age: 72
End: 2022-07-25
Payer: MEDICARE

## 2022-07-25 ENCOUNTER — HOSPITAL ENCOUNTER (OUTPATIENT)
Age: 72
Setting detail: SPECIMEN
Discharge: HOME | End: 2022-07-25

## 2022-07-25 VITALS
HEIGHT: 64 IN | DIASTOLIC BLOOD PRESSURE: 72 MMHG | SYSTOLIC BLOOD PRESSURE: 138 MMHG | RESPIRATION RATE: 18 BRPM | BODY MASS INDEX: 26.8 KG/M2 | WEIGHT: 157 LBS | HEART RATE: 77 BPM | OXYGEN SATURATION: 99 %

## 2022-07-25 DIAGNOSIS — M54.50 CHRONIC BILATERAL LOW BACK PAIN WITHOUT SCIATICA: ICD-10-CM

## 2022-07-25 DIAGNOSIS — Z87.891 PERSONAL HISTORY OF TOBACCO USE, PRESENTING HAZARDS TO HEALTH: ICD-10-CM

## 2022-07-25 DIAGNOSIS — Z71.89 ACP (ADVANCE CARE PLANNING): ICD-10-CM

## 2022-07-25 DIAGNOSIS — G89.29 CHRONIC BILATERAL LOW BACK PAIN WITHOUT SCIATICA: ICD-10-CM

## 2022-07-25 DIAGNOSIS — I10 ESSENTIAL HYPERTENSION: ICD-10-CM

## 2022-07-25 DIAGNOSIS — R01.1 MURMUR, CARDIAC: ICD-10-CM

## 2022-07-25 DIAGNOSIS — Z13.220 SCREENING CHOLESTEROL LEVEL: ICD-10-CM

## 2022-07-25 DIAGNOSIS — Z00.00 INITIAL MEDICARE ANNUAL WELLNESS VISIT: Primary | ICD-10-CM

## 2022-07-25 DIAGNOSIS — Z13.31 DEPRESSION SCREENING NEGATIVE: ICD-10-CM

## 2022-07-25 LAB
ABSOLUTE EOS #: 0.12 K/UL (ref 0–0.44)
ABSOLUTE IMMATURE GRANULOCYTE: <0.03 K/UL (ref 0–0.3)
ABSOLUTE LYMPH #: 2.64 K/UL (ref 1.1–3.7)
ABSOLUTE MONO #: 0.33 K/UL (ref 0.1–1.2)
ALBUMIN SERPL-MCNC: 4.4 G/DL (ref 3.5–5.2)
ALBUMIN/GLOBULIN RATIO: 1.5 (ref 1–2.5)
ALP BLD-CCNC: 55 U/L (ref 35–104)
ALT SERPL-CCNC: 10 U/L (ref 5–33)
ANION GAP SERPL CALCULATED.3IONS-SCNC: 14 MMOL/L (ref 9–17)
AST SERPL-CCNC: 14 U/L
BASOPHILS # BLD: 1 % (ref 0–2)
BASOPHILS ABSOLUTE: 0.03 K/UL (ref 0–0.2)
BILIRUB SERPL-MCNC: 0.54 MG/DL (ref 0.3–1.2)
BUN BLDV-MCNC: 8 MG/DL (ref 8–23)
CALCIUM SERPL-MCNC: 9.7 MG/DL (ref 8.6–10.4)
CHLORIDE BLD-SCNC: 103 MMOL/L (ref 98–107)
CHOLESTEROL/HDL RATIO: 3.8
CHOLESTEROL: 219 MG/DL
CO2: 25 MMOL/L (ref 20–31)
CREAT SERPL-MCNC: 0.53 MG/DL (ref 0.5–0.9)
EOSINOPHILS RELATIVE PERCENT: 2 % (ref 1–4)
GFR AFRICAN AMERICAN: >60 ML/MIN
GFR NON-AFRICAN AMERICAN: >60 ML/MIN
GFR SERPL CREATININE-BSD FRML MDRD: ABNORMAL ML/MIN/{1.73_M2}
GLUCOSE BLD-MCNC: 84 MG/DL (ref 70–99)
HCT VFR BLD CALC: 40.6 % (ref 36.3–47.1)
HDLC SERPL-MCNC: 58 MG/DL
HEMOGLOBIN: 14.2 G/DL (ref 11.9–15.1)
IMMATURE GRANULOCYTES: 0 %
LDL CHOLESTEROL: 142 MG/DL (ref 0–130)
LYMPHOCYTES # BLD: 47 % (ref 24–43)
MCH RBC QN AUTO: 31.6 PG (ref 25.2–33.5)
MCHC RBC AUTO-ENTMCNC: 35 G/DL (ref 28.4–34.8)
MCV RBC AUTO: 90.2 FL (ref 82.6–102.9)
MONOCYTES # BLD: 6 % (ref 3–12)
NRBC AUTOMATED: 0 PER 100 WBC
PDW BLD-RTO: 13.5 % (ref 11.8–14.4)
PLATELET # BLD: 306 K/UL (ref 138–453)
PMV BLD AUTO: 10.2 FL (ref 8.1–13.5)
POTASSIUM SERPL-SCNC: 3.6 MMOL/L (ref 3.7–5.3)
RBC # BLD: 4.5 M/UL (ref 3.95–5.11)
SEG NEUTROPHILS: 44 % (ref 36–65)
SEGMENTED NEUTROPHILS ABSOLUTE COUNT: 2.45 K/UL (ref 1.5–8.1)
SODIUM BLD-SCNC: 142 MMOL/L (ref 135–144)
TOTAL PROTEIN: 7.4 G/DL (ref 6.4–8.3)
TRIGL SERPL-MCNC: 97 MG/DL
WBC # BLD: 5.6 K/UL (ref 3.5–11.3)

## 2022-07-25 PROCEDURE — 4004F PT TOBACCO SCREEN RCVD TLK: CPT | Performed by: NURSE PRACTITIONER

## 2022-07-25 PROCEDURE — G0438 PPPS, INITIAL VISIT: HCPCS | Performed by: NURSE PRACTITIONER

## 2022-07-25 PROCEDURE — 99214 OFFICE O/P EST MOD 30 MIN: CPT | Performed by: NURSE PRACTITIONER

## 2022-07-25 PROCEDURE — 3017F COLORECTAL CA SCREEN DOC REV: CPT | Performed by: NURSE PRACTITIONER

## 2022-07-25 PROCEDURE — 1123F ACP DISCUSS/DSCN MKR DOCD: CPT | Performed by: NURSE PRACTITIONER

## 2022-07-25 PROCEDURE — G8417 CALC BMI ABV UP PARAM F/U: HCPCS | Performed by: NURSE PRACTITIONER

## 2022-07-25 PROCEDURE — G8399 PT W/DXA RESULTS DOCUMENT: HCPCS | Performed by: NURSE PRACTITIONER

## 2022-07-25 PROCEDURE — 99406 BEHAV CHNG SMOKING 3-10 MIN: CPT | Performed by: NURSE PRACTITIONER

## 2022-07-25 PROCEDURE — 99497 ADVNCD CARE PLAN 30 MIN: CPT | Performed by: NURSE PRACTITIONER

## 2022-07-25 PROCEDURE — 1090F PRES/ABSN URINE INCON ASSESS: CPT | Performed by: NURSE PRACTITIONER

## 2022-07-25 PROCEDURE — G8427 DOCREV CUR MEDS BY ELIG CLIN: HCPCS | Performed by: NURSE PRACTITIONER

## 2022-07-25 SDOH — ECONOMIC STABILITY: FOOD INSECURITY: WITHIN THE PAST 12 MONTHS, YOU WORRIED THAT YOUR FOOD WOULD RUN OUT BEFORE YOU GOT MONEY TO BUY MORE.: NEVER TRUE

## 2022-07-25 SDOH — ECONOMIC STABILITY: FOOD INSECURITY: WITHIN THE PAST 12 MONTHS, THE FOOD YOU BOUGHT JUST DIDN'T LAST AND YOU DIDN'T HAVE MONEY TO GET MORE.: NEVER TRUE

## 2022-07-25 ASSESSMENT — PATIENT HEALTH QUESTIONNAIRE - PHQ9
SUM OF ALL RESPONSES TO PHQ9 QUESTIONS 1 & 2: 2
SUM OF ALL RESPONSES TO PHQ QUESTIONS 1-9: 2
SUM OF ALL RESPONSES TO PHQ QUESTIONS 1-9: 2
1. LITTLE INTEREST OR PLEASURE IN DOING THINGS: 0
2. FEELING DOWN, DEPRESSED OR HOPELESS: 2
SUM OF ALL RESPONSES TO PHQ QUESTIONS 1-9: 2
SUM OF ALL RESPONSES TO PHQ QUESTIONS 1-9: 2

## 2022-07-25 ASSESSMENT — LIFESTYLE VARIABLES
HAVE YOU OR SOMEONE ELSE BEEN INJURED AS A RESULT OF YOUR DRINKING: 0
HAS A RELATIVE, FRIEND, DOCTOR, OR ANOTHER HEALTH PROFESSIONAL EXPRESSED CONCERN ABOUT YOUR DRINKING OR SUGGESTED YOU CUT DOWN: 0
HOW OFTEN DURING THE LAST YEAR HAVE YOU NEEDED AN ALCOHOLIC DRINK FIRST THING IN THE MORNING TO GET YOURSELF GOING AFTER A NIGHT OF HEAVY DRINKING: 0
HOW MANY STANDARD DRINKS CONTAINING ALCOHOL DO YOU HAVE ON A TYPICAL DAY: 3 OR 4
HOW OFTEN DURING THE LAST YEAR HAVE YOU FAILED TO DO WHAT WAS NORMALLY EXPECTED FROM YOU BECAUSE OF DRINKING: 0
HOW OFTEN DURING THE LAST YEAR HAVE YOU FOUND THAT YOU WERE NOT ABLE TO STOP DRINKING ONCE YOU HAD STARTED: 0
HOW OFTEN DURING THE LAST YEAR HAVE YOU BEEN UNABLE TO REMEMBER WHAT HAPPENED THE NIGHT BEFORE BECAUSE YOU HAD BEEN DRINKING: 0
HOW OFTEN DO YOU HAVE A DRINK CONTAINING ALCOHOL: 2-3 TIMES A WEEK

## 2022-07-25 ASSESSMENT — SOCIAL DETERMINANTS OF HEALTH (SDOH): HOW HARD IS IT FOR YOU TO PAY FOR THE VERY BASICS LIKE FOOD, HOUSING, MEDICAL CARE, AND HEATING?: NOT HARD AT ALL

## 2022-07-25 NOTE — PATIENT INSTRUCTIONS
Quitting Tobacco: Care Instructions  Overview     People who use tobacco crave the nicotine in tobacco. Giving it up is much harder than simply changing a habit. Your body has to stop craving the nicotine. It is hard to quit, but you can do it. There are many tools thatpeople use to quit. You may find that combining tools works best for you. There are several steps to quitting. Your doctor will help you set up the plan that best meets your needs. You may want to attend a tobacco-cessation program to help you quit. When you choose a program, look for one that has proven success. Ask your doctor for ideas. You will greatly increase your chances of success if you take medicine as well as get counseling or join a cessationprogram.  Some of the changes you feel when you first quit tobacco are uncomfortable. Your body will miss the nicotine at first, and you may feel short-tempered and grumpy. You may have trouble sleeping or concentrating. Medicine can help you deal with these symptoms. You may struggle with changing your habits. And theurge to use tobacco may continue for a time. This may be a lot to deal with, but keep at it. You will feel better. Follow-up care is a key part of your treatment and safety. Be sure to make and go to all appointments, and call your doctor if you are having problems. It's also a good idea to know your test results and keep alist of the medicines you take. How can you care for yourself at home? Get support. You have a better chance of quitting if you have help and support. Ask your family, friends, and coworkers for support. Join a support group, such as Nicotine Anonymous, for people who are trying to quit using tobacco.  Consider signing up for a tobacco cessation program, such as the American Lung Association's Freedom from Smoking program.  Get text messaging support. Go to the website at www.smokefree. gov to sign up for the [facility] program.  After you quit, do not use tobacco again, not even once. Get rid of all tobacco products and anything that reminds you of tobacco, like ashtrays, spit cups, and lighters. If you smoke, clean your house and your clothes to get rid of the smell. Learn how to live without tobacco. Think about ways you can avoid those things that make you reach for tobacco.  Avoid situations that put you at greatest risk. For some people, it's hard to have a drink with friends or a coffee break with coworkers without using tobacco.  Change your daily routine. Take a different route to work, or eat a meal in a different place. Try to cut down on stress. Calm yourself or release tension by doing an activity you enjoy, such as reading a book, taking a hot bath, or gardening. Learn about treatments that can help you quit. Talk to your doctor or pharmacist about nicotine replacement therapy. Nicotine replacement products help you slowly reduce the amount of nicotine you need. They can help you deal with cravings and withdrawal symptoms. These products include nicotine patches, gum, lozenges, nasal spray, and inhalers. Most are available without a prescription. Ask your doctor about varenicline (Chantix) or bupropion SR. These prescription medicines can help reduce withdrawal symptoms. They don't contain nicotine. Eat a healthy diet, and get regular exercise. Having healthy habits will help your body move past its craving for nicotine. Be prepared to keep trying. Most people aren't successful the first few times they try to quit. Don't give up if you use tobacco again. Make a list of things you learned, and think about when you want to try again. Where can you learn more? Go to https://kip.Stroho. org and sign in to your TapZen account. Enter Y048 in the [facility] box to learn more about \"Quitting Tobacco: Care Instructions. \"     If you do not have an account, please click on the \"Sign Up Now\" link.   Current as of: November 8, 2021               Content Version: 13.3  © 2006-2022 Healthwise, COTA. Care instructions adapted under license by [facility] ([facility]). If you have questions about a medical condition or this instruction, always ask your healthcare professional. [address] any warranty or liability for your use of this information. Learning About Benefits From Quitting Smoking  Why is it important to quit smoking? If you're thinking about quitting smoking, you may have a few reasons to besmoke-free. Your health may be one of them. When you quit smoking, you lower your risk for many serious health problems, such as cancer, lung disease, heart attack, stroke, blood vessel disease, and blindness from macular degeneration. When you're smoke-free, you get sick less often, and you heal faster. You are less likely to get colds, flu, bronchitis, and pneumonia. As a nonsmoker, you may find that your mood is better and you are less stressed. When and how will you feel healthier? Quitting has real health benefits that start from day 1 of being smoke-free. And the longer you stay smoke-free, the healthier you get and the better youfeel. The first hours  After just 20 minutes, your blood pressure and heart rate go down. That means there's less stress on your heart and blood vessels. Within 12 hours, the level of carbon monoxide in your blood drops back to normal. That makes room for more oxygen. With more oxygen in your body, you may notice that you have more energy than when you smoked. After 2 weeks  Your lungs start to work better. Your risk of heart attack starts to drop. After 1 month  When your lungs are clear, you cough less and breathe deeper, so it's easier to be active. Your sense of taste and smell return. That means you can enjoy food more than you have since you started smoking. Over the years  Over the years, your risks of heart disease, heart attack, and stroke are lower.   After 10 years, your risk of dying from lung cancer is cut by about half. And your risk for many other types of cancer is lower too. How would quitting help others in your life? When you quit smoking, you improve the health of everyone who now breathes inyour smoke. Their heart, lung, and cancer risks drop, much like yours. They are sick less. For babies and small children, living smoke-free means they're less likely to have ear infections, pneumonia, and bronchitis. If you're a woman who is or will be pregnant someday, quitting smoking means a healthier . Children who are close to you are less likely to become adult smokers. Where can you learn more? Go to https://Pepex BiomedicalpeLiquefied Natural Gaseb.AkeLex. org and sign in to your Pymetrics account. Enter [id number] in the [employer] box to learn more about \"Learning About Benefits From Quitting Smoking. \"     If you do not have an account, please click on the \"Sign Up Now\" link. Current as of: 2021               Content Version: 13.3  © 1861-4835 [employer]anta[employer]. Care instructions adapted under license by [facility] ([facility]). If you have questions about a medical condition or this instruction, always ask your healthcare professional. [place] [address] any warranty or liability for your use of this information. Advance Directives: Care Instructions  Overview  An advance directive is a legal way to state your wishes at the end of your life. It tells your family and your doctor what to do if you can't say what youwant. There are two main types of advance directives. You can change them any timeyour wishes change. Living will. This form tells your family and your doctor your wishes about life support and other treatment. The form is also called a declaration. Medical power of . This form lets you name a person to make treatment decisions for you when you can't speak for yourself.  This person is called a health care agent (health care proxy, health care surrogate). The form is also called a durable power of  for health care. If you do not have an advance directive, decisions about your medical care maybe made by a family member, or by a doctor or a  who doesn't know you. It may help to think of an advance directive as a gift to the people who carefor you. If you have one, they won't have to make tough decisions by themselves. Follow-up care is a key part of your treatment and safety. Be sure to make and go to all appointments, and call your doctor if you are having problems. It's also a good idea to know your test results and keep alist of the medicines you take. What should you include in an advance directive? Many states have a unique advance directive form. (It may ask you to address specific issues.) Or you might use a universal form that's approved by manystates. If your form doesn't tell you what to address, it may be hard to know what to include in your advance directive. Use the questions below to help you getstarted. Who do you want to make decisions about your medical care if you are not able to? What life-support measures do you want if you have a serious illness that gets worse over time or can't be cured? What are you most afraid of that might happen? (Maybe you're afraid of having pain, losing your independence, or being kept alive by machines.)  Where would you prefer to die? (Your home? A hospital? A nursing home?)  Do you want to donate your organs when you die? Do you want certain Latter-day practices performed before you die? When should you call for help? Be sure to contact your doctor if you have any questions. Where can you learn more? Go to https://chvelia.Sunible. org and sign in to your Isabella Oliver account. Enter R264 in the [employer] box to learn more about \"Advance Directives: Care Instructions. \"     If you do not have an account, please click on the \"Sign Up Now\" link. Current as of: October 18, 2021               Content Version: 13.3  © 2006-2022 Healthwise, Wipit. Care instructions adapted under license by [facility] ([facility]). If you have questions about a medical condition or this instruction, always ask your healthcare professional. [place] [address] any warranty or liability for your use of this information. Learning About Medical Power of   What is a medical power of ? A medical power of , also called a durable power of  for health care, is one type of the legal forms called advance directives. It lets you name the person you want to make treatment decisions for you if you can't speak or decide for yourself. The person you choose is called your health care agent. This person is also called a health care proxy or health care surrogate. A medical power of  may be called something else in your state. How do you choose a health care agent? Choose your health care agent carefully. This person may or may not be a familymember. Talk to the person before you make your final decision. Make sure he or she iscomfortable with this responsibility. It's a good idea to choose someone who:  Is at least 25years old. Knows you well and understands what makes life meaningful for you. Understands your Gnosticist and moral values. Will do what you want, not what he or she wants. Will be able to make difficult choices at a stressful time. Will be able to refuse or stop treatment, if that is what you would want, even if you could die. Will be firm and confident with health professionals if needed. Will ask questions to get needed information. Lives near you or agrees to travel to you if needed. Your family may help you make medical decisions while you can still be part of that process.  But it's important to choose one person to be your health careagent in case you aren't able to make decisions for yourself. If you don't fill out the legal form and name a health care agent, thedecisions your family can make may be limited. A health care agent may be called something else in your state. Who will make decisions for you if you don't have a health care agent? If you don't have a health care agent or a living will, you may not get the care you want. Decisions may be made by family members who disagree about your medical care. Or decisions may be made by a medical professional who doesn'tknow you well. In some cases, a  makes the decisions. When you name a health care agent, it is very clear who has the power to [place] decisions for you. How do you name a health care agent? You name your health care agent on a legal form. This form is usually called a medical power of . Ask your hospital, state bar association, or officeon aging where to find these forms. You must sign the form to make it legal. Some states require you to get the form notarized. This means that a person called a  watches you sign the form and then he or she signs the form. Some states also require thattwo or more witnesses sign the form. Be sure to tell your family members and doctors who your health care agent is. Where can you learn more? Go to https://Brisk.iovelia.Heyzap. org and sign in to your [employer] account. Enter [id number] in the [facility] box to learn more about \"Learning About [address]. \"     If you do not have an account, please click on the \"Sign Up Now\" link. Current as of: October 18, 2021               Content Version: 13.3  © 2006-2022 Healthwise, Incorporated. Care instructions adapted under license by [email or web address] ([employer]) [facility] ([facility]). If you have questions about a medical condition or this instruction, always ask your healthcare professional. [address]  any warranty or liability for your use of this information.       Personalized Preventive Plan for She Asif - 7/25/2022  Medicare offers a range of preventive health benefits. Some of the tests and screenings are paid in full while other may be subject to a deductible, co-insurance, and/or copay. Some of these benefits include a comprehensive review of your medical history including lifestyle, illnesses that may run in your family, and various assessments and screenings as appropriate. After reviewing your medical record and screening and assessments performed today your provider may have ordered immunizations, labs, imaging, and/or referrals for you. A list of these orders (if applicable) as well as your Preventive Care list are included within your After Visit Summary for your review. Other Preventive Recommendations:    A preventive eye exam performed by an eye specialist is recommended every 1-2 years to screen for glaucoma; cataracts, macular degeneration, and other eye disorders. A preventive dental visit is recommended every 6 months. Try to get at least 150 minutes of exercise per week or 10,000 steps per day on a pedometer . Order or download the FREE \"Exercise & Physical Activity: Your Everyday Guide\" from [employer]. Call 7-948.358.3496 or search [employer] online. You need 0347-8843 mg of calcium and 6545-0168 IU of vitamin D per day. It is possible to meet your calcium requirement with diet alone, but a vitamin D supplement is usually necessary to meet this goal.  When exposed to the sun, use a sunscreen that protects against both UVA and UVB radiation with an SPF of 30 or greater. Reapply every 2 to 3 hours or after sweating, drying off with a towel, or swimming. Always wear a seat belt when traveling in a car. Always wear a helmet when riding a bicycle or motorcycle.

## 2022-07-25 NOTE — PROGRESS NOTES
Gisel Clements, RHEAP-C  [address]  [id number] [facility] [place]. [address]  [phone]  [phone]    Marissa Lea is a 67 y.o. female who is here with c/o of:    Chief Complaint: Hypertension, Back Pain, and Medicare AWV      Patient Accompanied by: n/a    HPI - Marissa Lea is here today with c/o:    Adrenal Nodule   - [facility] [address] review   - pt reports f/u with urology for this problem    HTN   - she is not compliant with low sodium diet and admits she likes to eat junk food and is not physically active   - she continues to smoke and states she is not going to stop   - she does c/o palpitations, chest pressure and has known murmur   - she denies h/a, lower extremity edema   - current medications: HCTZ 12.5mg daily, amlodipine 10mg daily (expected non-compliance as last fill for 90 1/12/2022    Murmur   - patient reports murmur of several years and denies consultation with cardiology or previous Echo    Back pain   - chronic for a few years   - reports pain to her entire lower back and worse when she is sitting and walking too much   - she has tried tylenol and this does not help   - she denies radiation to either lower extremity or cauda equina    HM: patient declines colon cancer screening and vaccines    Patient Active Problem List    Diagnosis Date Noted    Essential hypertension 11/06/2020    Murmur, cardiac 11/06/2020    Hyperglycemia 11/06/2020    Adrenal nodule ([facility] [address] 11/06/2020    Chronic pain of right knee 11/06/2020    Nodule of lower lobe of left lung 11/06/2020       Past Medical History:   Diagnosis Date    Hypertension       No past surgical history on file. No family history on file.   Social History     Tobacco Use    Smoking status: Heavy Smoker     Packs/day: 0.50     Types: Cigarettes     Start date: 01/1976    Smokeless tobacco: Never   Substance Use Topics    Alcohol use: Not Currently     ALLERGIES:  No Known Allergies       Subjective     Constitutional: Negative for activity change, appetite change,unexpected weight change, chills, fever, and fatigue. HENT: Negative for ear pain, sore throat,  Rhinorrhea, sinus pain, sinus pressure, congestion. Eyes:  Negative for pain and discharge. Respiratory:  Negative for chest tightness, shortness of breath, wheezing, and cough. Cardiovascular:  Negative for chest pain, palpitations and leg swelling. Gastrointestinal: Negative for abdominal pain, blood in stool, constipation,diarrhea, nausea and vomiting. Endocrine: Negative for cold intolerance, heat intolerance, polydipsia, polyphagia and polyuria. Genitourinary: Negative for difficulty urinating, dysuria, frequency, hematuria and urgency. Musculoskeletal: Negative for arthralgias, joint swelling, myalgias, neck pain and neck stiffness. Positive for back pain  Skin: Negative for rash and wound. Allergic/Immunologic: Negative for environmental allergies and food allergies. Neurological:  Negative for dizziness, light-headedness, numbness and headaches. Hematological:  Negative for adenopathy. Does not bruise/bleed easily. Psychiatric/Behavioral: Negative for self-injury, sleep disturbance and suicidal ideas. Objective     PHYSICAL EXAM:   Constitutional: Ernie Duke is oriented to person, place, and time. Vital signs are normal. Appears well-developed and well-nourished. HEENT:   Head: Normocephalic and atraumatic. Right Ear: Hearing and external ear normal.   Left Ear: Hearing and external ear normal.   Eyes:PERRL, EOMI, Conjunctiva normal, No discharge. Neck: Full passive range of motion. Non-tender on palpation. Neck supple. No thyromegaly present. Trachea normal.  Cardiovascular: Normal rate, regular rhythm, S1, S2, Grade IV murmur, no gallop, no friction rub, intact distal pulses. Pulmonary/Chest: Breath sounds are clear throughout, No respiratory distress, No wheezing, No chest tenderness.  Effort normal  Musculoskeletal: Extremities appear regular and symmetric. No evident masses, lesions, foreign bodies, or other abnormalities. No edema. No tenderness on palpation. Joints are stable. Full ROM, strength and tone are within normal limits. Tenderness to entire lower back, no obvious deformity, no spasms, no bruising  Neurological: Alert and oriented to person, place, and time. Normal motor function, Normal sensory function, No focal deficits noted. He has normal strength. Skin: Skin is warm, dry and intact. No obvious lesions on exposed skin  Psychiatric: Normal mood and affect. Speech is normal and behavior is normal.     Nursing note and vitals reviewed. Blood pressure 138/72, pulse 77, resp. rate 18, height 5' 4\" (1.626 m), weight 157 lb (71.2 kg), SpO2 99 %. Body mass index is 26.95 kg/m². Wt Readings from Last 3 Encounters:   07/25/22 157 lb (71.2 kg)   01/12/22 166 lb (75.3 kg)   03/29/21 163 lb (73.9 kg)     BP Readings from Last 3 Encounters:   07/25/22 138/72   01/26/22 130/72   01/12/22 (!) 164/82       No results found for this visit on 07/25/22. Completed Orders/Prescriptions   No orders of the defined types were placed in this encounter. AssessmentPlan/Medical Decision Making     1. Initial Medicare annual wellness visit    2. ACP (advance care planning)  - [place]ARE PLAN FACE TO [address] [facility] [[id number]]    3. Depression screening negative  - PHQ-9 Total Score: 2 (7/25/2022  3:37 PM)  - [place] DEPRESSION SCREEN ANNUAL    4. Personal history of tobacco use, presenting hazards to health  - Tobacco Cessation Counseling: Patient advised about behavior change, including information about personal health harms, usage of appropriate cessation measures and benefits of cessation. Time spent (minutes): 3  - [place] TOBACCO USE CESSATION INTERMEDIATE 3-10 MINUTES [57001]    5.  Essential hypertension  - controlled  - stressed the importance of medication compliance  - reviewed DASH diet  - continue hctz 12.5mg daily (will refill with labs)  - contineu amlodipine 10mg daily    6. Chronic bilateral low back pain without sciatica  - will refill diclofenac with results of ordered labs  - declines PT or handouts for exercises    7. Murmur, cardiac  - asymptomatic  - will continue to follow    Follow up in 3 months for chronic conditions - sooner if needed for back pain    Return for Medicare Annual Wellness Visit in 1 year. 1.  Olegario Anderson received counseling on the following healthy behaviors: nutrition, exercise, medication adherence and tobacco cessation  2. Patient given educational materials - see patient instructions  3. Was a self-tracking handout given in paper form or via Shelfiet? No  If yes, see orders or list here. 4.  Discussed use, benefit, and side effects of prescribed medications. Barriers to medication compliance addressed. All patient questions answered. Pt voiced understanding. 5.  Reviewed prior labs, imaging, consultation, follow up, and health maintenance  6. Continue current medications, diet and exercise. 7. Discussed use, benefit, and side effects of prescribed medications. Barriers to medication compliance addressed. All her questions were answered. Pt voiced understanding. Olegario Anderson will continue current medications, diet and exercise. Patient given educational materials on smoking cessation    Of the 25 minute duration appointment visit, Janeth Delcid CNP spent at least 50% of the face-to-face time in counseling, explanation of diagnosis, planning of further management, and answering all questions. Signed:  Janeth Delcid CNP    This note is created with the assistance of a speech-recognition program.  While intending to generate a document that actually reflects the content of the visit, no guarantees can be provided that every mistake has been identified and corrected by editing.             Medicare Annual Wellness Visit    Rodri Martinez is here for Hypertension, Back Pain, and Medicare AWV    Assessment & Plan   Initial Medicare annual wellness visit  ACP (advance care planning)  -     [place] ADVANCED CARE PLAN FACE TO FACE, 1ST 30MIN [[address]]  Depression screening negative  -     [place] DEPRESSION SCREEN ANNUAL  Personal history of tobacco use, presenting hazards to health  -     [place] TOBACCO USE CESSATION INTERMEDIATE 3-10 MINUTES [37282]  Essential hypertension  Chronic bilateral low back pain without sciatica  Murmur, cardiac    Recommendations for Preventive Services Due: see orders and patient instructions/AVS.  Recommended screening schedule for the next 5-10 years is provided to the patient in written form: see Patient Instructions/AVS.     Return for Medicare Annual Wellness Visit in 1 year. Subjective       Patient's complete Health Risk Assessment and screening values have been reviewed and are found in Flowsheets. The following problems were reviewed today and where indicated follow up appointments were made and/or referrals ordered.     Positive Risk Factor Screenings with Interventions:       Tobacco Use:  Tobacco Use: High Risk    Smoking Tobacco Use: Heavy Smoker    Smokeless Tobacco Use: Never     E-cigarette/Vaping       Questions Responses    E-cigarette/Vaping Use     Start Date     Passive Exposure     Quit Date     Counseling Given     Comments           Substance Use - Tobacco Interventions:  tobacco cessation tips and resources provided, patient is not ready to work toward tobacco cessation at this time         General Health and ACP:  General  In general, how would you say your health is?: Fair  In the past 7 days, have you experienced any of the following: New or Increased Pain, New or Increased Fatigue, Loneliness, Social Isolation, Stress or Anger?: (!) Yes (back pain and addressed in separate note)  Select all that apply: (!) New or Increased Pain  Do you get the social and emotional support that you need?: Yes  Do you have a Living Will?: (!) No    Advance Directives       Power of  Living Will ACP-Advance Directive ACP-Power of     Not on File Not on File Not on File Not on [address] Risk Interventions:  No Living Will: Advance Care Planning addressed with patient today    Health Habits/Nutrition:  Physical Activity: Inactive    Days of Exercise per Week: 0 days    Minutes of Exercise per Session: 0 min     Have you lost any weight without trying in the past 3 months?: No  Body mass index: (!) 26.95  Have you seen the dentist within the past year?: (!) No  Health Habits/Nutrition Interventions:  Inadequate physical activity:  educational materials provided to promote increased physical activity  Dental exam overdue:  patient encouraged to make appointment with his/her dentist             Objective   Vitals:    07/25/22 1518   BP: 138/72   Site: Left Upper Arm   Position: Sitting   Pulse: 77   Resp: 18   SpO2: 99%   Weight: 157 lb (71.2 kg)   Height: 5' 4\" (1.626 m)      Body mass index is 26.95 kg/m². No Known Allergies  Prior to Visit Medications    Medication Sig Taking?  Authorizing Provider   amLODIPine (NORVASC) 10 MG tablet TAKE 1 TABLET BY MOUTH EVERY DAY Yes TRACE Garcia CNP   hydroCHLOROthiazide (HYDRODIURIL) 12.5 MG tablet Take 1 tablet by mouth daily Yes TRACE Luevano CNP   alendronate (FOSAMAX) 70 MG tablet Take 1 tablet by mouth every 7 days Yes TRACE Luevano CNP   diclofenac (VOLTAREN) 25 MG EC tablet TAKE 1 TABLET BY MOUTH 2 TIMES DAILY AS NEEDED FOR PAIN Yes TRACE Garcia CNP   aspirin 81 MG EC tablet Take 81 mg by mouth daily Yes Historical Provider, MD   albuterol sulfate HFA (PROAIR HFA) 108 (90 Base) MCG/ACT inhaler Inhale 2 puffs into the lungs every 4 hours as needed for Wheezing Yes ABBY Zuluaga (Including outside providers/suppliers regularly involved in providing care):   Patient Care Team:  TRACE Luevano CNP as PCP - General (Nurse Practitioner)  TRACE Calderon CNP as PCP - [facility] Empaneled Provider     Reviewed and updated this visit:  Tobacco  Allergies  Meds  Problems  Med Hx  Surg Hx  Soc Hx  Fam Hx                 Advance Care Planning   Advanced Care Planning: Discussed the patients choices for care and treatment in case of a health event that adversely affects decision-making abilities. Also discussed the patients long-term treatment options. Reviewed with the patient the appropriate state-specific advance directive documents. Reviewed the process of designating a competent adult as an Agent (or -in-fact) that could take make health care decisions for the patient if incompetent. Patient was asked to complete the declaration forms, either acknowledge the forms by a public notary or an eligible witness and provide a signed copy to the practice office.   Time spent (minutes): 5

## 2022-07-26 DIAGNOSIS — E87.6 HYPOKALEMIA: Primary | ICD-10-CM

## 2022-07-26 RX ORDER — POTASSIUM CHLORIDE 750 MG/1
10 TABLET, EXTENDED RELEASE ORAL 2 TIMES DAILY
Qty: 180 TABLET | Refills: 0 | Status: SHIPPED | OUTPATIENT
Start: 2022-07-26

## 2022-11-08 DIAGNOSIS — I10 ESSENTIAL HYPERTENSION: ICD-10-CM

## 2022-11-08 RX ORDER — AMLODIPINE BESYLATE 10 MG/1
10 TABLET ORAL DAILY
Qty: 30 TABLET | Refills: 0 | Status: SHIPPED | OUTPATIENT
Start: 2022-11-08

## 2022-11-08 NOTE — TELEPHONE ENCOUNTER
Monie Vincent is calling to request a refill on the following medication(s):    Last Visit Date (If Applicable):  7/93/1494    Next Visit Date:    Visit date not found    Medication Request:  Requested Prescriptions     Pending Prescriptions Disp Refills    amLODIPine (NORVASC) 10 MG tablet 90 tablet 0            Please refill once more. Patient states she will see her new PCP next month.

## 2022-12-01 DIAGNOSIS — I10 ESSENTIAL HYPERTENSION: ICD-10-CM

## 2022-12-05 RX ORDER — AMLODIPINE BESYLATE 10 MG/1
TABLET ORAL
Qty: 30 TABLET | Refills: 0 | OUTPATIENT
Start: 2022-12-05

## 2023-06-23 ENCOUNTER — HOSPITAL ENCOUNTER (OUTPATIENT)
Dept: MAMMOGRAPHY | Age: 73
End: 2023-06-23
Payer: MEDICARE

## 2023-06-23 DIAGNOSIS — Z12.31 ENCOUNTER FOR SCREENING MAMMOGRAM FOR BREAST CANCER: ICD-10-CM

## 2023-06-23 PROCEDURE — 77063 BREAST TOMOSYNTHESIS BI: CPT

## 2023-07-07 ENCOUNTER — HOSPITAL ENCOUNTER (OUTPATIENT)
Dept: WOMENS IMAGING | Age: 73
End: 2023-07-07
Payer: MEDICARE

## 2023-07-07 DIAGNOSIS — R92.8 ABNORMAL MAMMOGRAM: ICD-10-CM

## 2023-07-07 PROCEDURE — G0279 TOMOSYNTHESIS, MAMMO: HCPCS

## 2023-08-21 VITALS — HEIGHT: 64 IN | BODY MASS INDEX: 28.17 KG/M2 | WEIGHT: 165 LBS

## 2023-08-23 ENCOUNTER — HOSPITAL ENCOUNTER (OUTPATIENT)
Dept: GENERAL RADIOLOGY | Age: 73
Discharge: HOME | End: 2023-08-25
Payer: MEDICARE

## 2023-08-23 ENCOUNTER — HOSPITAL ENCOUNTER (OUTPATIENT)
Age: 73
Discharge: HOME | End: 2023-08-25
Payer: MEDICARE

## 2023-08-23 DIAGNOSIS — G89.29 OTHER CHRONIC PAIN: ICD-10-CM

## 2023-08-23 DIAGNOSIS — M25.569 KNEE PAIN, UNSPECIFIED CHRONICITY, UNSPECIFIED LATERALITY: ICD-10-CM

## 2023-08-23 PROCEDURE — 73562 X-RAY EXAM OF KNEE 3: CPT

## 2023-08-23 PROCEDURE — 72100 X-RAY EXAM L-S SPINE 2/3 VWS: CPT

## 2023-08-29 ENCOUNTER — HOSPITAL ENCOUNTER (OUTPATIENT)
Dept: CT IMAGING | Age: 73
Discharge: HOME | End: 2023-08-31
Payer: MEDICARE

## 2023-08-29 DIAGNOSIS — Z12.2 ENCOUNTER FOR SCREENING FOR MALIGNANT NEOPLASM OF RESPIRATORY ORGANS: ICD-10-CM

## 2023-08-29 DIAGNOSIS — F17.210 CIGARETTE SMOKER: ICD-10-CM

## 2023-08-29 PROCEDURE — 71271 CT THORAX LUNG CANCER SCR C-: CPT

## 2024-01-30 ENCOUNTER — HOSPITAL ENCOUNTER (INPATIENT)
Age: 74
LOS: 3 days | Discharge: HOME HEALTH | End: 2024-02-02
Attending: EMERGENCY MEDICINE | Admitting: SURGERY
Payer: MEDICARE

## 2024-01-30 ENCOUNTER — APPOINTMENT (OUTPATIENT)
Dept: CT IMAGING | Age: 74
End: 2024-01-30
Payer: MEDICARE

## 2024-01-30 ENCOUNTER — APPOINTMENT (OUTPATIENT)
Dept: GENERAL RADIOLOGY | Age: 74
End: 2024-01-30
Payer: MEDICARE

## 2024-01-30 ENCOUNTER — APPOINTMENT (OUTPATIENT)
Dept: MRI IMAGING | Age: 74
End: 2024-01-30
Payer: MEDICARE

## 2024-01-30 DIAGNOSIS — R01.1 MURMUR, CARDIAC: ICD-10-CM

## 2024-01-30 DIAGNOSIS — S22.040A COMPRESSION FRACTURE OF T4 VERTEBRA, INITIAL ENCOUNTER (HCC): ICD-10-CM

## 2024-01-30 DIAGNOSIS — S22.43XA CLOSED FRACTURE OF MULTIPLE RIBS OF BOTH SIDES, INITIAL ENCOUNTER: Primary | ICD-10-CM

## 2024-01-30 PROBLEM — S22.041A BURST FRACTURE OF FOURTH THORACIC VERTEBRA (HCC): Status: ACTIVE | Noted: 2024-01-30

## 2024-01-30 LAB
25(OH)D3 SERPL-MCNC: 30 NG/ML
ALBUMIN SERPL-MCNC: 4.4 G/DL (ref 3.5–5.2)
ANION GAP SERPL CALCULATED.3IONS-SCNC: 20 MMOL/L (ref 9–17)
BASOPHILS # BLD: 0.03 K/UL (ref 0–0.2)
BASOPHILS NFR BLD: 0 % (ref 0–2)
BUN SERPL-MCNC: 11 MG/DL (ref 8–23)
CALCIUM SERPL-MCNC: 9.7 MG/DL (ref 8.6–10.4)
CHLORIDE SERPL-SCNC: 97 MMOL/L (ref 98–107)
CO2 SERPL-SCNC: 21 MMOL/L (ref 20–31)
CREAT SERPL-MCNC: 0.5 MG/DL (ref 0.5–0.9)
EOSINOPHIL # BLD: <0.03 K/UL (ref 0–0.44)
EOSINOPHILS RELATIVE PERCENT: 0 % (ref 1–4)
ERYTHROCYTE [DISTWIDTH] IN BLOOD BY AUTOMATED COUNT: 13.4 % (ref 11.8–14.4)
EST. AVERAGE GLUCOSE BLD GHB EST-MCNC: 103 MG/DL
ETHANOL PERCENT: <0.01 %
ETHANOLAMINE SERPL-MCNC: <10 MG/DL
FOLATE SERPL-MCNC: 9.6 NG/ML
GFR SERPL CREATININE-BSD FRML MDRD: >60 ML/MIN/1.73M2
GLUCOSE SERPL-MCNC: 140 MG/DL (ref 70–99)
HBA1C MFR BLD: 5.2 % (ref 4–6)
HCT VFR BLD AUTO: 38.9 % (ref 36.3–47.1)
HGB BLD-MCNC: 13.9 G/DL (ref 11.9–15.1)
IMM GRANULOCYTES # BLD AUTO: 0.03 K/UL (ref 0–0.3)
IMM GRANULOCYTES NFR BLD: 0 %
INR PPP: 1
LYMPHOCYTES NFR BLD: 1.58 K/UL (ref 1.1–3.7)
LYMPHOCYTES RELATIVE PERCENT: 17 % (ref 24–43)
MAGNESIUM SERPL-MCNC: 2.3 MG/DL (ref 1.6–2.6)
MCH RBC QN AUTO: 31.9 PG (ref 25.2–33.5)
MCHC RBC AUTO-ENTMCNC: 35.7 G/DL (ref 28.4–34.8)
MCV RBC AUTO: 89.2 FL (ref 82.6–102.9)
MONOCYTES NFR BLD: 0.45 K/UL (ref 0.1–1.2)
MONOCYTES NFR BLD: 5 % (ref 3–12)
NEUTROPHILS NFR BLD: 78 % (ref 36–65)
NEUTS SEG NFR BLD: 6.96 K/UL (ref 1.5–8.1)
NRBC BLD-RTO: 0 PER 100 WBC
PARTIAL THROMBOPLASTIN TIME: 27.1 SEC (ref 23–36.5)
PLATELET # BLD AUTO: 260 K/UL (ref 138–453)
PMV BLD AUTO: 10.8 FL (ref 8.1–13.5)
POTASSIUM SERPL-SCNC: 3 MMOL/L (ref 3.7–5.3)
PROTHROMBIN TIME: 13.4 SEC (ref 11.7–14.9)
RBC # BLD AUTO: 4.36 M/UL (ref 3.95–5.11)
SODIUM SERPL-SCNC: 138 MMOL/L (ref 135–144)
T4 FREE SERPL-MCNC: 1.4 NG/DL (ref 0.9–1.7)
TROPONIN I SERPL HS-MCNC: 13 NG/L (ref 0–14)
TROPONIN I SERPL HS-MCNC: 15 NG/L (ref 0–14)
TSH SERPL DL<=0.05 MIU/L-ACNC: 0.84 UIU/ML (ref 0.3–5)
VIT B12 SERPL-MCNC: 382 PG/ML (ref 232–1245)
WBC OTHER # BLD: 9.1 K/UL (ref 3.5–11.3)

## 2024-01-30 PROCEDURE — 6370000000 HC RX 637 (ALT 250 FOR IP): Performed by: EMERGENCY MEDICINE

## 2024-01-30 PROCEDURE — 70450 CT HEAD/BRAIN W/O DYE: CPT

## 2024-01-30 PROCEDURE — 6370000000 HC RX 637 (ALT 250 FOR IP): Performed by: STUDENT IN AN ORGANIZED HEALTH CARE EDUCATION/TRAINING PROGRAM

## 2024-01-30 PROCEDURE — 1200000000 HC SEMI PRIVATE

## 2024-01-30 PROCEDURE — 84443 ASSAY THYROID STIM HORMONE: CPT

## 2024-01-30 PROCEDURE — 85730 THROMBOPLASTIN TIME PARTIAL: CPT

## 2024-01-30 PROCEDURE — 96374 THER/PROPH/DIAG INJ IV PUSH: CPT

## 2024-01-30 PROCEDURE — 72128 CT CHEST SPINE W/O DYE: CPT

## 2024-01-30 PROCEDURE — 82607 VITAMIN B-12: CPT

## 2024-01-30 PROCEDURE — 85025 COMPLETE CBC W/AUTO DIFF WBC: CPT

## 2024-01-30 PROCEDURE — 93005 ELECTROCARDIOGRAM TRACING: CPT | Performed by: EMERGENCY MEDICINE

## 2024-01-30 PROCEDURE — 84439 ASSAY OF FREE THYROXINE: CPT

## 2024-01-30 PROCEDURE — 80048 BASIC METABOLIC PNL TOTAL CA: CPT

## 2024-01-30 PROCEDURE — 70498 CT ANGIOGRAPHY NECK: CPT

## 2024-01-30 PROCEDURE — 84484 ASSAY OF TROPONIN QUANT: CPT

## 2024-01-30 PROCEDURE — 71045 X-RAY EXAM CHEST 1 VIEW: CPT

## 2024-01-30 PROCEDURE — 6360000002 HC RX W HCPCS: Performed by: EMERGENCY MEDICINE

## 2024-01-30 PROCEDURE — 80307 DRUG TEST PRSMV CHEM ANLYZR: CPT

## 2024-01-30 PROCEDURE — 82746 ASSAY OF FOLIC ACID SERUM: CPT

## 2024-01-30 PROCEDURE — 2580000003 HC RX 258

## 2024-01-30 PROCEDURE — 72131 CT LUMBAR SPINE W/O DYE: CPT

## 2024-01-30 PROCEDURE — 6360000004 HC RX CONTRAST MEDICATION: Performed by: STUDENT IN AN ORGANIZED HEALTH CARE EDUCATION/TRAINING PROGRAM

## 2024-01-30 PROCEDURE — 99285 EMERGENCY DEPT VISIT HI MDM: CPT

## 2024-01-30 PROCEDURE — 72125 CT NECK SPINE W/O DYE: CPT

## 2024-01-30 PROCEDURE — G0480 DRUG TEST DEF 1-7 CLASSES: HCPCS

## 2024-01-30 PROCEDURE — 85610 PROTHROMBIN TIME: CPT

## 2024-01-30 PROCEDURE — 6370000000 HC RX 637 (ALT 250 FOR IP)

## 2024-01-30 PROCEDURE — 83036 HEMOGLOBIN GLYCOSYLATED A1C: CPT

## 2024-01-30 PROCEDURE — 83735 ASSAY OF MAGNESIUM: CPT

## 2024-01-30 PROCEDURE — 82306 VITAMIN D 25 HYDROXY: CPT

## 2024-01-30 PROCEDURE — 82040 ASSAY OF SERUM ALBUMIN: CPT

## 2024-01-30 PROCEDURE — 2580000003 HC RX 258: Performed by: STUDENT IN AN ORGANIZED HEALTH CARE EDUCATION/TRAINING PROGRAM

## 2024-01-30 PROCEDURE — 70486 CT MAXILLOFACIAL W/O DYE: CPT

## 2024-01-30 PROCEDURE — 72146 MRI CHEST SPINE W/O DYE: CPT

## 2024-01-30 RX ORDER — SODIUM CHLORIDE, SODIUM LACTATE, POTASSIUM CHLORIDE, CALCIUM CHLORIDE 600; 310; 30; 20 MG/100ML; MG/100ML; MG/100ML; MG/100ML
INJECTION, SOLUTION INTRAVENOUS CONTINUOUS
Status: DISCONTINUED | OUTPATIENT
Start: 2024-01-30 | End: 2024-01-30

## 2024-01-30 RX ORDER — POTASSIUM CHLORIDE 20 MEQ/1
40 TABLET, EXTENDED RELEASE ORAL 2 TIMES DAILY
Status: DISCONTINUED | OUTPATIENT
Start: 2024-01-30 | End: 2024-01-31

## 2024-01-30 RX ORDER — METHOCARBAMOL 750 MG/1
750 TABLET, FILM COATED ORAL EVERY 6 HOURS
Status: DISCONTINUED | OUTPATIENT
Start: 2024-01-30 | End: 2024-02-02 | Stop reason: HOSPADM

## 2024-01-30 RX ORDER — ONDANSETRON 2 MG/ML
4 INJECTION INTRAMUSCULAR; INTRAVENOUS EVERY 6 HOURS PRN
Status: DISCONTINUED | OUTPATIENT
Start: 2024-01-30 | End: 2024-02-02 | Stop reason: HOSPADM

## 2024-01-30 RX ORDER — FENTANYL CITRATE 50 UG/ML
50 INJECTION, SOLUTION INTRAMUSCULAR; INTRAVENOUS
Status: DISCONTINUED | OUTPATIENT
Start: 2024-01-30 | End: 2024-02-02 | Stop reason: HOSPADM

## 2024-01-30 RX ORDER — HYDROCHLOROTHIAZIDE 25 MG/1
12.5 TABLET ORAL DAILY
Status: DISCONTINUED | OUTPATIENT
Start: 2024-01-31 | End: 2024-02-02 | Stop reason: HOSPADM

## 2024-01-30 RX ORDER — GABAPENTIN 300 MG/1
300 CAPSULE ORAL EVERY 8 HOURS SCHEDULED
Status: DISCONTINUED | OUTPATIENT
Start: 2024-01-30 | End: 2024-02-02 | Stop reason: HOSPADM

## 2024-01-30 RX ORDER — AMLODIPINE BESYLATE 10 MG/1
10 TABLET ORAL DAILY
Status: DISCONTINUED | OUTPATIENT
Start: 2024-01-31 | End: 2024-02-02 | Stop reason: HOSPADM

## 2024-01-30 RX ORDER — SODIUM CHLORIDE 9 MG/ML
INJECTION, SOLUTION INTRAVENOUS PRN
Status: DISCONTINUED | OUTPATIENT
Start: 2024-01-30 | End: 2024-02-02 | Stop reason: HOSPADM

## 2024-01-30 RX ORDER — ONDANSETRON 4 MG/1
4 TABLET, ORALLY DISINTEGRATING ORAL EVERY 8 HOURS PRN
Status: DISCONTINUED | OUTPATIENT
Start: 2024-01-30 | End: 2024-02-02 | Stop reason: HOSPADM

## 2024-01-30 RX ORDER — IPRATROPIUM BROMIDE AND ALBUTEROL SULFATE 2.5; .5 MG/3ML; MG/3ML
1 SOLUTION RESPIRATORY (INHALATION)
Status: DISCONTINUED | OUTPATIENT
Start: 2024-01-30 | End: 2024-01-30

## 2024-01-30 RX ORDER — MORPHINE SULFATE 4 MG/ML
2 INJECTION INTRAVENOUS ONCE
Status: COMPLETED | OUTPATIENT
Start: 2024-01-30 | End: 2024-01-30

## 2024-01-30 RX ORDER — POLYETHYLENE GLYCOL 3350 17 G/17G
17 POWDER, FOR SOLUTION ORAL DAILY
Status: DISCONTINUED | OUTPATIENT
Start: 2024-01-30 | End: 2024-02-02 | Stop reason: HOSPADM

## 2024-01-30 RX ORDER — SODIUM CHLORIDE, SODIUM LACTATE, POTASSIUM CHLORIDE, CALCIUM CHLORIDE 600; 310; 30; 20 MG/100ML; MG/100ML; MG/100ML; MG/100ML
INJECTION, SOLUTION INTRAVENOUS CONTINUOUS
Status: DISCONTINUED | OUTPATIENT
Start: 2024-01-31 | End: 2024-02-01

## 2024-01-30 RX ORDER — SODIUM CHLORIDE 0.9 % (FLUSH) 0.9 %
5-40 SYRINGE (ML) INJECTION EVERY 12 HOURS SCHEDULED
Status: DISCONTINUED | OUTPATIENT
Start: 2024-01-30 | End: 2024-02-02 | Stop reason: HOSPADM

## 2024-01-30 RX ORDER — OXYCODONE HYDROCHLORIDE 5 MG/1
5 TABLET ORAL EVERY 6 HOURS PRN
Status: DISCONTINUED | OUTPATIENT
Start: 2024-01-30 | End: 2024-02-01

## 2024-01-30 RX ORDER — ACETAMINOPHEN 500 MG
1000 TABLET ORAL EVERY 8 HOURS SCHEDULED
Status: DISCONTINUED | OUTPATIENT
Start: 2024-01-30 | End: 2024-02-02 | Stop reason: HOSPADM

## 2024-01-30 RX ORDER — IBUPROFEN 400 MG/1
400 TABLET ORAL EVERY 6 HOURS
Status: DISCONTINUED | OUTPATIENT
Start: 2024-01-30 | End: 2024-01-31

## 2024-01-30 RX ORDER — SENNA AND DOCUSATE SODIUM 50; 8.6 MG/1; MG/1
1 TABLET, FILM COATED ORAL 2 TIMES DAILY
Status: DISCONTINUED | OUTPATIENT
Start: 2024-01-30 | End: 2024-02-02 | Stop reason: HOSPADM

## 2024-01-30 RX ORDER — 0.9 % SODIUM CHLORIDE 0.9 %
1000 INTRAVENOUS SOLUTION INTRAVENOUS ONCE
Status: COMPLETED | OUTPATIENT
Start: 2024-01-30 | End: 2024-01-30

## 2024-01-30 RX ORDER — IPRATROPIUM BROMIDE AND ALBUTEROL SULFATE 2.5; .5 MG/3ML; MG/3ML
1 SOLUTION RESPIRATORY (INHALATION) EVERY 4 HOURS PRN
Status: DISCONTINUED | OUTPATIENT
Start: 2024-01-30 | End: 2024-02-02 | Stop reason: HOSPADM

## 2024-01-30 RX ORDER — SODIUM CHLORIDE 0.9 % (FLUSH) 0.9 %
5-40 SYRINGE (ML) INJECTION PRN
Status: DISCONTINUED | OUTPATIENT
Start: 2024-01-30 | End: 2024-02-02 | Stop reason: HOSPADM

## 2024-01-30 RX ADMIN — DOCUSATE SODIUM 50 MG AND SENNOSIDES 8.6 MG 1 TABLET: 8.6; 5 TABLET, FILM COATED ORAL at 21:52

## 2024-01-30 RX ADMIN — POTASSIUM CHLORIDE 40 MEQ: 1500 TABLET, EXTENDED RELEASE ORAL at 21:52

## 2024-01-30 RX ADMIN — SODIUM CHLORIDE, PRESERVATIVE FREE 10 ML: 5 INJECTION INTRAVENOUS at 21:27

## 2024-01-30 RX ADMIN — MORPHINE SULFATE 2 MG: 4 INJECTION INTRAVENOUS at 16:57

## 2024-01-30 RX ADMIN — IOPAMIDOL 75 ML: 755 INJECTION, SOLUTION INTRAVENOUS at 17:54

## 2024-01-30 RX ADMIN — GABAPENTIN 300 MG: 300 CAPSULE ORAL at 21:52

## 2024-01-30 RX ADMIN — IBUPROFEN 400 MG: 400 TABLET, FILM COATED ORAL at 18:26

## 2024-01-30 RX ADMIN — POTASSIUM BICARBONATE 40 MEQ: 782 TABLET, EFFERVESCENT ORAL at 18:25

## 2024-01-30 RX ADMIN — METHOCARBAMOL 750 MG: 750 TABLET ORAL at 18:26

## 2024-01-30 RX ADMIN — SODIUM CHLORIDE 1000 ML: 9 INJECTION, SOLUTION INTRAVENOUS at 18:29

## 2024-01-30 ASSESSMENT — ENCOUNTER SYMPTOMS
NAUSEA: 0
VOMITING: 0
ABDOMINAL PAIN: 0
SHORTNESS OF BREATH: 0
BACK PAIN: 1

## 2024-01-30 ASSESSMENT — LIFESTYLE VARIABLES: HOW OFTEN DO YOU HAVE A DRINK CONTAINING ALCOHOL: NEVER

## 2024-01-30 NOTE — CONSULTS
Department of Neurosurgery                                            Nurse Practitioner Consult Note      Reason for Consult:  Fall. CT shows  acute burst compression fracture at T4 with up to 60% loss of height and 5 mm of dorsal retropulsion.   Requesting Physician:  Isabel Mike MD   Neurosurgeon:   [] Dr. Dubois  [] Dr. Rodríguez  [x] Dr. Molina  [] Dr. Alanis      History Obtained From:  patient, electronic medical record    CHIEF COMPLAINT:         Chief Complaint   Patient presents with    Fall     X 2 days, denies LOC, denies blood thinner    Back Pain    Facial Pain     nose       HISTORY OF PRESENT ILLNESS:       The patient is a 73 y.o. female who presents after fall down a few steps 2 days ago. States she came into the ED due to uncontrollable back pain. Reports she was walking down the steps when she lost her balance and fell forward onto her face. She has bilat ecchymosis under both eyes. She had back pain that continued and was unable to tolerate the pain. Denies any saddle anesthesia. Denies any paresthesias. Had episode of urinary incontinence due to not being able to get the bathroom in time due to the pain.     Takes daily ASA at home. Last does this AM.     Neurosurgery arrival time at Bedside: 1730    PAST MEDICAL HISTORY :       Past Medical History:        Diagnosis Date    Hypertension        Past Surgical History:    History reviewed. No pertinent surgical history.    Social History:   Social History     Socioeconomic History    Marital status: Single     Spouse name: Not on file    Number of children: Not on file    Years of education: Not on file    Highest education level: Not on file   Occupational History    Not on file   Tobacco Use    Smoking status: Heavy Smoker     Current packs/day: 0.50     Average packs/day: 0.5 packs/day for 61.1 years (30.5 ttl pk-yrs)     Types: Cigarettes     Start date: 1963    Smokeless tobacco: Never   Substance and Sexual Activity    Alcohol

## 2024-01-30 NOTE — PROGRESS NOTES
15 Variable Trauma-Specific Frailty Index   Comorbidities   Cancer History Yes (1) No (0)   Coronary Heart Disease MI (1) CABG (0.75) Mild (0.25)  No (0)   Dementia Severe (1) Moderate (0.5) Mild (0.25)  No (0)   Daily Activities   Help With Grooming Yes (1) No (0)   Help with Managing Money Yes (1) No (0)   Help doing Housework Yes (1) No (0)   Help with Toileting Yes (1) No (0)   Help Walking Wheelchair (1) Walker (0.75) Cane (0.5) No (0)   Health Attitude   Feel Less Useful Most Time (1) Sometimes (0.5) Never (0)   Feel Sad Most Time (1) Sometimes (0.5) Never (0)   Feel Effort to Do Everything Most Time (1) Sometimes (0.5) Never (0)   Feels Lonely Most Time (1) Sometimes (0.5) Never (0)   Falls Most Time (1) Sometimes (0.5) Never (0)   Function   Sexually Active Yes (0)  No(1)   Nutrition   Albumin < 3g/dL (1)  > 3g/dL   Scoring   Score   FI (Score/15)   > 0.25 = Frail   *Based on 2-weeks prior to hospital admission   Trauma Specific Fraility Index > or = to 4 (4/15 = 0.26)  Trauma Specific Fraility Index Score:    0.25

## 2024-01-30 NOTE — ED TRIAGE NOTES
Pt fell x 2 days ago, pt denies taking a blood thinner, pt denies LOC with fall, pt complains of facial pain and back pain

## 2024-01-30 NOTE — H&P
[facility]MA H&P/CONSULT    PATIENT NAME: Kaylee Purvis  YOB: 1950  MEDICAL RECORD NO. [id number]   DATE: 1/30/2024  PRIMARY CARE PHYSICIAN: Jeremiah Manning MD  PATIENT EVALUATED AT THE REQUEST OF DR. Mike    ACTIVATION   []Trauma Alert     [] Trauma Priority     [x]Trauma Consult: Time at bedside: 1655    Patient Active Problem List   Diagnosis   • Essential hypertension   • Murmur, cardiac   • Hyperglycemia   • Adrenal nodule (HCC)   • Chronic pain of right knee   • Nodule of lower lobe of left lung       IMPRESSION AND PLAN:     T4 burst compression fx, 60% loss of normal height   F/u labs and imaging    Neurosurgery consult    MRI thoracic spine   Maintain thoracic and lumbar alignment   Neuro checks per protocol   NPO at midnight in anticipation for neuro intervention    Acute nondisplaced posterior fractures Right ribs 1-2 and Left rib 1   CTA neck w/ contrast   Supplemental oxygen as needed    MMPT   IS   Daily PIC   Repeat cxr in AM     Hx of unspecified cardiac murmur   Cardiology consult    Surgical risk stratification    TTE prior to surgical intervention    Continuous telemetry       DVT Prophylaxis: Hold in anticipation for neuro intervention      If intracranial hemorrhage is present, is it a:  [] BIG 1  [] BIG 2  [] BIG 3  If chest wall injury: Rib score_6__    CONSULT SERVICES    [x] Neurosurgery     [] Orthopedic Surgery    [] Cardiothoracic     [] Facial Trauma    [] Plastic Surgery (Burn)    [] Pediatric Surgery     [] Internal Medicine    [] Pulmonary Medicine    [] Geriatrics    [] Other:        HISTORY:     Chief Complaint:  \"Chest pain\"    GENERAL DATA  Patient information was obtained from patient.  History/Exam limitations: none.  Injury Date: 1/28/24   Approximate Injury Time: Unknown        Transport mode:   []Ambulance      [] Helicopter     []Car       [] Other  Referring Hospital: N/A    SETTING OF TRAUMATIC EVENT   Location (e.g., home, farm, industry, street):

## 2024-01-30 NOTE — ED PROVIDER NOTES
[facility] ED  Emergency Department Encounter  Emergency Medicine Resident     Pt Name:Kaylee Purvis  MRN: [id number]  Birthdate 1950  Date of evaluation: 1/30/24  PCP:  Jeremiah Manning MD  Note Started: 2:56 PM EST      CHIEF COMPLAINT       Chief Complaint   Patient presents with    Fall     X 2 days, denies LOC, denies blood thinner    Back Pain    Facial Pain     nose       HISTORY OF PRESENT ILLNESS  (Location/Symptom, Timing/Onset, Context/Setting, Quality, Duration, Modifying Factors, Severity.)      Kaylee Purvis is a 73 y.o. female who presents with back pain.  Patient stated that 2 days ago she was walking down the stairs when she tripped and fell hitting her face.  Patient states that her back has been bothering her therefore she came to the emergency department today to be evaluated.  Patient also complains of chest tightness off and on for the past several days.  Patient denies any difficulty breathing, abdominal pain, nausea, vomiting.    PAST MEDICAL / SURGICAL / SOCIAL / FAMILY HISTORY      has a past medical history of Hypertension.     has no past surgical history on file.    Social History     Socioeconomic History    Marital status: Single     Spouse name: Not on file    Number of children: Not on file    Years of education: Not on file    Highest education level: Not on file   Occupational History    Not on file   Tobacco Use    Smoking status: Heavy Smoker     Current packs/day: 0.50     Average packs/day: 0.5 packs/day for 61.1 years (30.5 ttl pk-yrs)     Types: Cigarettes     Start date: 1963    Smokeless tobacco: Never   Substance and Sexual Activity    Alcohol use: Not Currently    Drug use: Not Currently    Sexual activity: Not Currently   Other Topics Concern    Not on file   Social History Narrative    Not on file     Social Determinants of Health     Financial Resource Strain: Low Risk  (7/25/2022)    Overall Financial Resource Strain (CARDIA)

## 2024-01-30 NOTE — ED NOTES
Pt presents to the ED with c/o back pain after a fall on Sunday.  Pt states she lost her balance and fell down three stairs and hit her nose/face on the wall. Pt presents with bruising under both eyes. Pt denies loss of consciousness and blood thinners. Pt denies neck pain or numbness or tingling.  Pt denies chest pain shortness of breath.  Pt states hx of back pan but got worse after fall.   Pt states hx of heart murmer and hypertension.   Pt is a/o x4. VSS. Pt placed on monitor. IV established. Call light within reach. Ed staff at bedside.

## 2024-01-30 NOTE — ED NOTES
Sexual Activity   • Alcohol use: Not Currently   • Drug use: Not Currently   • Sexual activity: Not Currently     Social Determinants of Health     Financial Resource Strain: Low Risk  (7/25/2022)    Overall Financial Resource Strain (CARDIA)    • Difficulty of Paying Living Expenses: Not hard at all   Food Insecurity: No Food Insecurity (7/25/2022)    Hunger Vital Sign    • Worried About Running Out of Food in the Last Year: Never true    • Ran Out of Food in the Last Year: Never true   Physical Activity: Inactive (7/25/2022)    Exercise Vital Sign    • Days of Exercise per Week: 0 days    • Minutes of Exercise per Session: 0 min       FAMILY HISTORY       Family History   Problem Relation Age of Onset   • Breast Cancer Paternal Aunt 65       ALLERGIES     Patient has no known allergies.    CURRENT MEDICATIONS       Previous Medications    ALBUTEROL SULFATE HFA (PROAIR HFA) 108 (90 BASE) MCG/ACT INHALER    Inhale 2 puffs into the lungs every 4 hours as needed for Wheezing    ALENDRONATE (FOSAMAX) 70 MG TABLET    Take 1 tablet by mouth every 7 days    AMLODIPINE (NORVASC) 10 MG TABLET    Take 1 tablet by mouth daily    ASPIRIN 81 MG EC TABLET    Take 81 mg by mouth daily    DICLOFENAC (VOLTAREN) 25 MG EC TABLET    TAKE 1 TABLET BY MOUTH 2 TIMES DAILY AS NEEDED FOR PAIN    HYDROCHLOROTHIAZIDE (HYDRODIURIL) 12.5 MG TABLET    Take 1 tablet by mouth daily    POTASSIUM CHLORIDE (KLOR-CON M) 10 MEQ EXTENDED RELEASE TABLET    Take 1 tablet by mouth in the morning and 1 tablet before bedtime.     Orders Placed This Encounter   Medications   • morphine injection 2 mg       SURGICAL HISTORY     History reviewed. No pertinent surgical history.    PAST MEDICAL HISTORY       Past Medical History:   Diagnosis Date   • Hypertension        Labs:  Labs Reviewed   CBC WITH AUTO DIFFERENTIAL - Abnormal; Notable for the following components:       Result Value    MCHC 35.7 (*)     Neutrophils % 78 (*)     Lymphocytes % 17 (*)      Eosinophils % 0 (*)     All other components within normal limits   BASIC METABOLIC PANEL - Abnormal; Notable for the following components:    Potassium 3.0 (*)     Chloride 97 (*)     Anion Gap 20 (*)     Glucose 140 (*)     All other components within normal limits   TROPONIN - Abnormal; Notable for the following components:    Troponin, High Sensitivity 15 (*)     All other components within normal limits   MAGNESIUM   TROPONIN   PROTIME-INR   APTT   VITAMIN D 25 HYDROXY   ALBUMIN   HEMOGLOBIN A1C   T4, FREE   TSH   VITAMIN B12 & FOLATE       Electronically signed by Mag Stark RN on 1/30/2024 at 5:24 PM

## 2024-01-30 NOTE — ED PROVIDER NOTES
[facility]     Emergency Department     Faculty Note/ Attestation      Pt Name: Kaylee Purvis                                       MRN: [id number]  Birthdate 1950  Date of evaluation: 1/30/2024  Note Started: 3:19 PM EST    Patients PCP:    Jeremiah Manning MD    Attestation  I performed a history and physical examination of the patient and discussed management with the resident. I reviewed the resident’s note and agree with the documented findings and plan of care. Any areas of disagreement are noted on the chart. I was personally present for the key portions of any procedures. I have documented in the chart those procedures where I was not present during the key portions. I have reviewed the emergency nurses triage note. I agree with the chief complaint, past medical history, past surgical history, allergies, medications, social and family history as documented unless otherwise noted below.    For Physician Assistant/ Nurse Practitioner cases/documentation I have personally evaluated this patient and have completed at least one if not all key elements of the E/M (history, physical exam, and MDM). Additional findings are as noted.    Initial Screens:        [place] Coma Scale  Eye Opening: Spontaneous  Best Verbal Response: Oriented  Best Motor Response: Obeys commands  [place] Coma Scale Score: 15    Vitals:    Vitals:    01/30/24 1441   BP: (!) 142/66   Pulse: 78   Resp: 16   Temp: 97.4 °F (36.3 °C)   TempSrc: Oral   SpO2: 98%       CHIEF COMPLAINT       Chief Complaint   Patient presents with    Fall     X 2 days, denies LOC, denies blood thinner    Back Pain    Facial Pain     nose     Patient is a 73-year-old female who fell 2 days ago on her last stair hit her face severe back pain since patient has had so much back pain it hurts to get up and even go to the bathroom this is led to her actually having an episode of urinating on self just due to the pain and not being able

## 2024-01-31 ENCOUNTER — APPOINTMENT (OUTPATIENT)
Dept: GENERAL RADIOLOGY | Age: 74
End: 2024-01-31
Payer: MEDICARE

## 2024-01-31 ENCOUNTER — APPOINTMENT (OUTPATIENT)
Age: 74
End: 2024-01-31
Payer: MEDICARE

## 2024-01-31 ENCOUNTER — ANESTHESIA EVENT (OUTPATIENT)
Dept: OPERATING ROOM | Age: 74
End: 2024-01-31
Payer: MEDICARE

## 2024-01-31 ENCOUNTER — ANESTHESIA (OUTPATIENT)
Dept: OPERATING ROOM | Age: 74
End: 2024-01-31
Payer: MEDICARE

## 2024-01-31 PROBLEM — Z01.810 PREOPERATIVE CARDIOVASCULAR EXAMINATION: Status: ACTIVE | Noted: 2024-01-31

## 2024-01-31 PROBLEM — Z51.5 ENCOUNTER FOR PALLIATIVE CARE: Status: ACTIVE | Noted: 2024-01-31

## 2024-01-31 PROBLEM — Z71.89 GOALS OF CARE, COUNSELING/DISCUSSION: Status: ACTIVE | Noted: 2024-01-31

## 2024-01-31 PROBLEM — S22.040A COMPRESSION FRACTURE OF T4 VERTEBRA (HCC): Status: ACTIVE | Noted: 2024-01-31

## 2024-01-31 PROBLEM — S22.43XA MULTIPLE CLOSED FRACTURES OF RIBS OF BOTH SIDES: Status: ACTIVE | Noted: 2024-01-31

## 2024-01-31 LAB
ABO + RH BLD: NORMAL
AMPHET UR QL SCN: NEGATIVE
ANION GAP SERPL CALCULATED.3IONS-SCNC: 6 MMOL/L (ref 9–17)
ARM BAND NUMBER: NORMAL
ARTERIAL PATENCY WRIST A: ABNORMAL
BARBITURATES UR QL SCN: NEGATIVE
BASOPHILS # BLD: <0.03 K/UL (ref 0–0.2)
BASOPHILS NFR BLD: 0 % (ref 0–2)
BENZODIAZ UR QL: NEGATIVE
BLOOD BANK SAMPLE EXPIRATION: NORMAL
BLOOD GROUP ANTIBODIES SERPL: NEGATIVE
BODY TEMPERATURE: 35.6
BUN SERPL-MCNC: 8 MG/DL (ref 8–23)
CA-I BLD-SCNC: 1.12 MMOL/L (ref 1.13–1.33)
CALCIUM SERPL-MCNC: 8.7 MG/DL (ref 8.6–10.4)
CANNABINOIDS UR QL SCN: NEGATIVE
CHLORIDE SERPL-SCNC: 103 MMOL/L (ref 98–107)
CHLORIDE, WHOLE BLOOD: 111 MMOL/L (ref 98–110)
CO2 SERPL-SCNC: 27 MMOL/L (ref 20–31)
COCAINE UR QL SCN: NEGATIVE
COHGB MFR BLD: 1 % (ref 0–5)
CREAT SERPL-MCNC: 0.4 MG/DL (ref 0.5–0.9)
ECHO AO ROOT DIAM: 2.8 CM
ECHO AO ROOT INDEX: 1.53 CM/M2
ECHO AV AREA PEAK VELOCITY: 1.1 CM2
ECHO AV AREA VTI: 1.4 CM2
ECHO AV AREA/BSA PEAK VELOCITY: 0.6 CM2/M2
ECHO AV AREA/BSA VTI: 0.8 CM2/M2
ECHO AV MEAN GRADIENT: 14 MMHG
ECHO AV MEAN VELOCITY: 1.7 M/S
ECHO AV PEAK GRADIENT: 28 MMHG
ECHO AV PEAK VELOCITY: 2.7 M/S
ECHO AV VELOCITY RATIO: 0.48
ECHO AV VTI: 56.7 CM
ECHO BSA: 1.87 M2
ECHO LA AREA 2C: 23 CM2
ECHO LA AREA 4C: 17.8 CM2
ECHO LA DIAMETER INDEX: 1.75 CM/M2
ECHO LA DIAMETER: 3.2 CM
ECHO LA MAJOR AXIS: 5.3 CM
ECHO LA MINOR AXIS: 6.5 CM
ECHO LA TO AORTIC ROOT RATIO: 1.14
ECHO LA VOL BP: 61 ML (ref 22–52)
ECHO LA VOL MOD A2C: 67 ML (ref 22–52)
ECHO LA VOL MOD A4C: 49 ML (ref 22–52)
ECHO LA VOL/BSA BIPLANE: 33 ML/M2 (ref 16–34)
ECHO LA VOLUME INDEX MOD A2C: 37 ML/M2 (ref 16–34)
ECHO LA VOLUME INDEX MOD A4C: 27 ML/M2 (ref 16–34)
ECHO LV E' LATERAL VELOCITY: 9 CM/S
ECHO LV E' SEPTAL VELOCITY: 7 CM/S
ECHO LV EDV 3D: 102 ML
ECHO LV EDV INDEX 3D: 56 ML/M2
ECHO LV EJECTION FRACTION 3D: 64 %
ECHO LV ESV 3D: 36 ML
ECHO LV ESV INDEX 3D: 20 ML/M2
ECHO LV FRACTIONAL SHORTENING: 30 % (ref 28–44)
ECHO LV INTERNAL DIMENSION DIASTOLE INDEX: 2.02 CM/M2
ECHO LV INTERNAL DIMENSION DIASTOLIC: 3.7 CM (ref 3.9–5.3)
ECHO LV INTERNAL DIMENSION SYSTOLIC INDEX: 1.42 CM/M2
ECHO LV INTERNAL DIMENSION SYSTOLIC: 2.6 CM
ECHO LV IVSD: 1 CM (ref 0.6–0.9)
ECHO LV MASS 2D: 112.5 G (ref 67–162)
ECHO LV MASS 3D INDEX: 66.7 G/M2
ECHO LV MASS 3D: 122 G
ECHO LV MASS INDEX 2D: 61.5 G/M2 (ref 43–95)
ECHO LV POSTERIOR WALL DIASTOLIC: 1 CM (ref 0.6–0.9)
ECHO LV RELATIVE WALL THICKNESS RATIO: 0.54
ECHO LVOT AREA: 2.3 CM2
ECHO LVOT AV VTI INDEX: 0.61
ECHO LVOT DIAM: 1.7 CM
ECHO LVOT MEAN GRADIENT: 4 MMHG
ECHO LVOT PEAK GRADIENT: 7 MMHG
ECHO LVOT PEAK VELOCITY: 1.3 M/S
ECHO LVOT STROKE VOLUME INDEX: 42.8 ML/M2
ECHO LVOT SV: 78.3 ML
ECHO LVOT VTI: 34.5 CM
ECHO MV A VELOCITY: 1.29 M/S
ECHO MV AREA VTI: 1.8 CM2
ECHO MV E DECELERATION TIME (DT): 273 MS
ECHO MV E VELOCITY: 1.04 M/S
ECHO MV E/A RATIO: 0.81
ECHO MV E/E' LATERAL: 11.56
ECHO MV E/E' RATIO (AVERAGED): 13.21
ECHO MV LVOT VTI INDEX: 1.24
ECHO MV MAX VELOCITY: 1.5 M/S
ECHO MV MEAN GRADIENT: 3 MMHG
ECHO MV MEAN VELOCITY: 0.8 M/S
ECHO MV PEAK GRADIENT: 9 MMHG
ECHO MV VTI: 42.9 CM
ECHO PV MAX VELOCITY: 1.4 M/S
ECHO PV PEAK GRADIENT: 8 MMHG
ECHO RV BASAL DIMENSION: 3.1 CM
ECHO RV FREE WALL PEAK S': 19 CM/S
ECHO RV TAPSE: 2.6 CM (ref 1.7–?)
EKG ATRIAL RATE: 78 BPM
EKG P AXIS: 69 DEGREES
EKG P-R INTERVAL: 162 MS
EKG Q-T INTERVAL: 392 MS
EKG QRS DURATION: 84 MS
EKG QTC CALCULATION (BAZETT): 446 MS
EKG R AXIS: 25 DEGREES
EKG T AXIS: 38 DEGREES
EKG VENTRICULAR RATE: 78 BPM
EOSINOPHIL # BLD: 0.04 K/UL (ref 0–0.44)
EOSINOPHILS RELATIVE PERCENT: 1 % (ref 1–4)
ERYTHROCYTE [DISTWIDTH] IN BLOOD BY AUTOMATED COUNT: 13.3 % (ref 11.8–14.4)
FENTANYL UR QL: NEGATIVE
FIO2 ON VENT: ABNORMAL %
GFR SERPL CREATININE-BSD FRML MDRD: >60 ML/MIN/1.73M2
GLUCOSE BLD-MCNC: 109 MG/DL (ref 65–105)
GLUCOSE SERPL-MCNC: 112 MG/DL (ref 70–99)
HCO3 ARTERIAL: 21.9 MMOL/L (ref 22–27)
HCT VFR BLD AUTO: 35.6 % (ref 36.3–47.1)
HCT VFR BLD CALC: 36.1 % (ref 36.3–47.1)
HEMOGLOBIN: 11.7 GM/DL (ref 11.9–15.1)
HGB BLD-MCNC: 12.5 G/DL (ref 11.9–15.1)
IMM GRANULOCYTES # BLD AUTO: 0.03 K/UL (ref 0–0.3)
IMM GRANULOCYTES NFR BLD: 0 %
LACTIC ACID, WHOLE BLOOD: 0.7 MMOL/L (ref 0.7–2.1)
LYMPHOCYTES NFR BLD: 2.42 K/UL (ref 1.1–3.7)
LYMPHOCYTES RELATIVE PERCENT: 29 % (ref 24–43)
MAGNESIUM SERPL-MCNC: 2.1 MG/DL (ref 1.6–2.6)
MCH RBC QN AUTO: 31.7 PG (ref 25.2–33.5)
MCHC RBC AUTO-ENTMCNC: 35.1 G/DL (ref 28.4–34.8)
MCV RBC AUTO: 90.4 FL (ref 82.6–102.9)
METHADONE UR QL: NEGATIVE
MONOCYTES NFR BLD: 0.59 K/UL (ref 0.1–1.2)
MONOCYTES NFR BLD: 7 % (ref 3–12)
NEGATIVE BASE EXCESS, ART: 2.6 MMOL/L (ref 0–2)
NEUTROPHILS NFR BLD: 63 % (ref 36–65)
NEUTS SEG NFR BLD: 5.34 K/UL (ref 1.5–8.1)
NRBC BLD-RTO: 0 PER 100 WBC
O2 SAT, ARTERIAL: 99.8 % (ref 94–100)
OPIATES UR QL SCN: POSITIVE
OXYCODONE UR QL SCN: NEGATIVE
PCO2 ARTERIAL: 39 MMHG (ref 32–45)
PCO2, ART, TEMP ADJ: 36.4 (ref 32–45)
PCP UR QL SCN: NEGATIVE
PH ARTERIAL: 7.37 (ref 7.35–7.45)
PH, ART, TEMP ADJ: 7.39 (ref 7.35–7.45)
PHOSPHATE SERPL-MCNC: 2.4 MG/DL (ref 2.6–4.5)
PLATELET # BLD AUTO: 224 K/UL (ref 138–453)
PMV BLD AUTO: 10.4 FL (ref 8.1–13.5)
PO2 ARTERIAL: 381 MMHG (ref 75–95)
PO2, ART, TEMP ADJ: 374 MMHG (ref 75–95)
POTASSIUM SERPL-SCNC: 3.5 MMOL/L (ref 3.7–5.3)
POTASSIUM, WHOLE BLOOD: 3.1 MMOL/L (ref 3.6–5)
RBC # BLD AUTO: 3.94 M/UL (ref 3.95–5.11)
SODIUM SERPL-SCNC: 136 MMOL/L (ref 135–144)
SODIUM, WHOLE BLOOD: 138 MMOL/L (ref 136–145)
TEST INFORMATION: ABNORMAL
WBC OTHER # BLD: 8.4 K/UL (ref 3.5–11.3)

## 2024-01-31 PROCEDURE — 80048 BASIC METABOLIC PNL TOTAL CA: CPT

## 2024-01-31 PROCEDURE — 7100000001 HC PACU RECOVERY - ADDTL 15 MIN: Performed by: NEUROLOGICAL SURGERY

## 2024-01-31 PROCEDURE — 85025 COMPLETE CBC W/AUTO DIFF WBC: CPT

## 2024-01-31 PROCEDURE — 2709999900 HC NON-CHARGEABLE SUPPLY: Performed by: NEUROLOGICAL SURGERY

## 2024-01-31 PROCEDURE — 93010 ELECTROCARDIOGRAM REPORT: CPT | Performed by: INTERNAL MEDICINE

## 2024-01-31 PROCEDURE — 93306 TTE W/DOPPLER COMPLETE: CPT | Performed by: INTERNAL MEDICINE

## 2024-01-31 PROCEDURE — 6360000002 HC RX W HCPCS

## 2024-01-31 PROCEDURE — 2580000003 HC RX 258: Performed by: NEUROLOGICAL SURGERY

## 2024-01-31 PROCEDURE — 6360000002 HC RX W HCPCS: Performed by: NEUROLOGICAL SURGERY

## 2024-01-31 PROCEDURE — 86901 BLOOD TYPING SEROLOGIC RH(D): CPT

## 2024-01-31 PROCEDURE — 84132 ASSAY OF SERUM POTASSIUM: CPT

## 2024-01-31 PROCEDURE — 93306 TTE W/DOPPLER COMPLETE: CPT

## 2024-01-31 PROCEDURE — C9359 IMPLNT,BON VOID FILLER-PUTTY: HCPCS | Performed by: NEUROLOGICAL SURGERY

## 2024-01-31 PROCEDURE — 6370000000 HC RX 637 (ALT 250 FOR IP): Performed by: STUDENT IN AN ORGANIZED HEALTH CARE EDUCATION/TRAINING PROGRAM

## 2024-01-31 PROCEDURE — 82330 ASSAY OF CALCIUM: CPT

## 2024-01-31 PROCEDURE — 2500000003 HC RX 250 WO HCPCS: Performed by: NEUROLOGICAL SURGERY

## 2024-01-31 PROCEDURE — 2580000003 HC RX 258: Performed by: ANESTHESIOLOGY

## 2024-01-31 PROCEDURE — 85014 HEMATOCRIT: CPT

## 2024-01-31 PROCEDURE — 85018 HEMOGLOBIN: CPT

## 2024-01-31 PROCEDURE — 83605 ASSAY OF LACTIC ACID: CPT

## 2024-01-31 PROCEDURE — 0RG7071 FUSION OF 2 TO 7 THORACIC VERTEBRAL JOINTS WITH AUTOLOGOUS TISSUE SUBSTITUTE, POSTERIOR APPROACH, POSTERIOR COLUMN, OPEN APPROACH: ICD-10-PCS | Performed by: NEUROLOGICAL SURGERY

## 2024-01-31 PROCEDURE — 3700000000 HC ANESTHESIA ATTENDED CARE: Performed by: NEUROLOGICAL SURGERY

## 2024-01-31 PROCEDURE — 7100000000 HC PACU RECOVERY - FIRST 15 MIN: Performed by: NEUROLOGICAL SURGERY

## 2024-01-31 PROCEDURE — 6370000000 HC RX 637 (ALT 250 FOR IP): Performed by: NEUROLOGICAL SURGERY

## 2024-01-31 PROCEDURE — 86850 RBC ANTIBODY SCREEN: CPT

## 2024-01-31 PROCEDURE — 3600000004 HC SURGERY LEVEL 4 BASE: Performed by: NEUROLOGICAL SURGERY

## 2024-01-31 PROCEDURE — 2500000003 HC RX 250 WO HCPCS

## 2024-01-31 PROCEDURE — 2580000003 HC RX 258

## 2024-01-31 PROCEDURE — 86900 BLOOD TYPING SEROLOGIC ABO: CPT

## 2024-01-31 PROCEDURE — 8E0WXBZ COMPUTER ASSISTED PROCEDURE OF TRUNK REGION: ICD-10-PCS | Performed by: NEUROLOGICAL SURGERY

## 2024-01-31 PROCEDURE — 2580000003 HC RX 258: Performed by: STUDENT IN AN ORGANIZED HEALTH CARE EDUCATION/TRAINING PROGRAM

## 2024-01-31 PROCEDURE — 3600000014 HC SURGERY LEVEL 4 ADDTL 15MIN: Performed by: NEUROLOGICAL SURGERY

## 2024-01-31 PROCEDURE — APPSS15 APP SPLIT SHARED TIME 0-15 MINUTES: Performed by: NURSE PRACTITIONER

## 2024-01-31 PROCEDURE — 99223 1ST HOSP IP/OBS HIGH 75: CPT | Performed by: INTERNAL MEDICINE

## 2024-01-31 PROCEDURE — 2500000003 HC RX 250 WO HCPCS: Performed by: STUDENT IN AN ORGANIZED HEALTH CARE EDUCATION/TRAINING PROGRAM

## 2024-01-31 PROCEDURE — C1713 ANCHOR/SCREW BN/BN,TIS/BN: HCPCS | Performed by: NEUROLOGICAL SURGERY

## 2024-01-31 PROCEDURE — 83735 ASSAY OF MAGNESIUM: CPT

## 2024-01-31 PROCEDURE — 6370000000 HC RX 637 (ALT 250 FOR IP)

## 2024-01-31 PROCEDURE — 99232 SBSQ HOSP IP/OBS MODERATE 35: CPT | Performed by: SURGERY

## 2024-01-31 PROCEDURE — 3700000001 HC ADD 15 MINUTES (ANESTHESIA): Performed by: NEUROLOGICAL SURGERY

## 2024-01-31 PROCEDURE — 84100 ASSAY OF PHOSPHORUS: CPT

## 2024-01-31 PROCEDURE — 82805 BLOOD GASES W/O2 SATURATION: CPT

## 2024-01-31 PROCEDURE — 1200000000 HC SEMI PRIVATE

## 2024-01-31 PROCEDURE — 99222 1ST HOSP IP/OBS MODERATE 55: CPT

## 2024-01-31 DEVICE — SCREW 54840004530 MAS 4.5X30 CC
Type: IMPLANTABLE DEVICE | Status: FUNCTIONAL
Brand: CD HORIZON® SPINAL SYSTEM

## 2024-01-31 DEVICE — DBM T43110 10CC GRAFTON PUTTY
Type: IMPLANTABLE DEVICE | Status: FUNCTIONAL
Brand: GRAFTON®AND GRAFTON PLUS®DEMINERALIZED BONE MATRIX (DBM)

## 2024-01-31 DEVICE — ROD 1475501100 4.75 CCM NS CURV 100MM
Type: IMPLANTABLE DEVICE | Status: FUNCTIONAL
Brand: CD HORIZON® SPINAL SYSTEM

## 2024-01-31 DEVICE — DBM 7509215 MAGNIFUSE 1 X 5CM
Type: IMPLANTABLE DEVICE | Status: FUNCTIONAL
Brand: MAGNIFUSE® BONE GRAFT

## 2024-01-31 DEVICE — SET SCREW 5440030 4.75 TI NS BRK OFF
Type: IMPLANTABLE DEVICE | Status: FUNCTIONAL
Brand: CD HORIZON® SPINAL SYSTEM

## 2024-01-31 DEVICE — SCREW 54840004540 MAS 4.5X40 CC
Type: IMPLANTABLE DEVICE | Status: FUNCTIONAL
Brand: CD HORIZON® SPINAL SYSTEM

## 2024-01-31 DEVICE — SCREW 54840004535 MAS 4.5X35 CC
Type: IMPLANTABLE DEVICE | Status: FUNCTIONAL
Brand: CD HORIZON® SPINAL SYSTEM

## 2024-01-31 RX ORDER — DIPHENHYDRAMINE HYDROCHLORIDE 50 MG/ML
12.5 INJECTION INTRAMUSCULAR; INTRAVENOUS
Status: DISCONTINUED | OUTPATIENT
Start: 2024-01-31 | End: 2024-01-31

## 2024-01-31 RX ORDER — ONDANSETRON 2 MG/ML
INJECTION INTRAMUSCULAR; INTRAVENOUS PRN
Status: DISCONTINUED | OUTPATIENT
Start: 2024-01-31 | End: 2024-01-31 | Stop reason: SDUPTHER

## 2024-01-31 RX ORDER — SODIUM CHLORIDE 9 MG/ML
INJECTION, SOLUTION INTRAVENOUS PRN
Status: DISCONTINUED | OUTPATIENT
Start: 2024-01-31 | End: 2024-01-31

## 2024-01-31 RX ORDER — PROPOFOL 10 MG/ML
INJECTION, EMULSION INTRAVENOUS PRN
Status: DISCONTINUED | OUTPATIENT
Start: 2024-01-31 | End: 2024-01-31 | Stop reason: SDUPTHER

## 2024-01-31 RX ORDER — LIDOCAINE HYDROCHLORIDE 10 MG/ML
INJECTION, SOLUTION EPIDURAL; INFILTRATION; INTRACAUDAL; PERINEURAL PRN
Status: DISCONTINUED | OUTPATIENT
Start: 2024-01-31 | End: 2024-01-31 | Stop reason: SDUPTHER

## 2024-01-31 RX ORDER — POTASSIUM CHLORIDE 20 MEQ/1
10 TABLET, EXTENDED RELEASE ORAL 2 TIMES DAILY
Status: DISCONTINUED | OUTPATIENT
Start: 2024-01-31 | End: 2024-02-02

## 2024-01-31 RX ORDER — TRAMADOL HYDROCHLORIDE 50 MG/1
50 TABLET ORAL
Status: DISCONTINUED | OUTPATIENT
Start: 2024-01-31 | End: 2024-01-31

## 2024-01-31 RX ORDER — ATORVASTATIN CALCIUM 20 MG/1
20 TABLET, FILM COATED ORAL DAILY
COMMUNITY
Start: 2023-11-06

## 2024-01-31 RX ORDER — SODIUM CHLORIDE, SODIUM LACTATE, POTASSIUM CHLORIDE, CALCIUM CHLORIDE 600; 310; 30; 20 MG/100ML; MG/100ML; MG/100ML; MG/100ML
INJECTION, SOLUTION INTRAVENOUS CONTINUOUS PRN
Status: DISCONTINUED | OUTPATIENT
Start: 2024-01-31 | End: 2024-01-31 | Stop reason: SDUPTHER

## 2024-01-31 RX ORDER — CEFAZOLIN SODIUM 1 G/3ML
INJECTION, POWDER, FOR SOLUTION INTRAMUSCULAR; INTRAVENOUS PRN
Status: DISCONTINUED | OUTPATIENT
Start: 2024-01-31 | End: 2024-01-31 | Stop reason: SDUPTHER

## 2024-01-31 RX ORDER — CALCIUM CHLORIDE 100 MG/ML
INJECTION INTRAVENOUS; INTRAVENTRICULAR PRN
Status: DISCONTINUED | OUTPATIENT
Start: 2024-01-31 | End: 2024-01-31 | Stop reason: SDUPTHER

## 2024-01-31 RX ORDER — OMEPRAZOLE 20 MG/1
20 CAPSULE, DELAYED RELEASE ORAL DAILY
COMMUNITY
Start: 2024-01-02

## 2024-01-31 RX ORDER — ALBUTEROL SULFATE 90 UG/1
2 AEROSOL, METERED RESPIRATORY (INHALATION) EVERY 4 HOURS PRN
Status: DISCONTINUED | OUTPATIENT
Start: 2024-01-31 | End: 2024-02-02 | Stop reason: HOSPADM

## 2024-01-31 RX ORDER — SODIUM CHLORIDE 0.9 % (FLUSH) 0.9 %
5-40 SYRINGE (ML) INJECTION EVERY 12 HOURS SCHEDULED
Status: DISCONTINUED | OUTPATIENT
Start: 2024-01-31 | End: 2024-02-02 | Stop reason: HOSPADM

## 2024-01-31 RX ORDER — PHENYLEPHRINE HCL IN 0.9% NACL 1 MG/10 ML
SYRINGE (ML) INTRAVENOUS PRN
Status: DISCONTINUED | OUTPATIENT
Start: 2024-01-31 | End: 2024-01-31 | Stop reason: SDUPTHER

## 2024-01-31 RX ORDER — SODIUM CHLORIDE 0.9 % (FLUSH) 0.9 %
5-40 SYRINGE (ML) INJECTION PRN
Status: DISCONTINUED | OUTPATIENT
Start: 2024-01-31 | End: 2024-01-31

## 2024-01-31 RX ORDER — FENTANYL CITRATE 50 UG/ML
INJECTION, SOLUTION INTRAMUSCULAR; INTRAVENOUS PRN
Status: DISCONTINUED | OUTPATIENT
Start: 2024-01-31 | End: 2024-01-31 | Stop reason: SDUPTHER

## 2024-01-31 RX ORDER — ROCURONIUM BROMIDE 10 MG/ML
INJECTION, SOLUTION INTRAVENOUS PRN
Status: DISCONTINUED | OUTPATIENT
Start: 2024-01-31 | End: 2024-01-31 | Stop reason: SDUPTHER

## 2024-01-31 RX ORDER — LABETALOL HYDROCHLORIDE 5 MG/ML
10 INJECTION, SOLUTION INTRAVENOUS
Status: DISCONTINUED | OUTPATIENT
Start: 2024-01-31 | End: 2024-02-01

## 2024-01-31 RX ORDER — POTASSIUM CHLORIDE 7.45 MG/ML
INJECTION INTRAVENOUS PRN
Status: DISCONTINUED | OUTPATIENT
Start: 2024-01-31 | End: 2024-01-31 | Stop reason: SDUPTHER

## 2024-01-31 RX ORDER — CHOLECALCIFEROL (VITAMIN D3) 125 MCG
1 CAPSULE ORAL DAILY
COMMUNITY
Start: 2024-01-02

## 2024-01-31 RX ORDER — HYDRALAZINE HYDROCHLORIDE 20 MG/ML
10 INJECTION INTRAMUSCULAR; INTRAVENOUS
Status: DISCONTINUED | OUTPATIENT
Start: 2024-01-31 | End: 2024-02-01

## 2024-01-31 RX ORDER — FENTANYL CITRATE 50 UG/ML
25 INJECTION, SOLUTION INTRAMUSCULAR; INTRAVENOUS EVERY 5 MIN PRN
Status: DISCONTINUED | OUTPATIENT
Start: 2024-01-31 | End: 2024-01-31

## 2024-01-31 RX ORDER — LIDOCAINE HYDROCHLORIDE AND EPINEPHRINE 10; 10 MG/ML; UG/ML
INJECTION, SOLUTION INFILTRATION; PERINEURAL PRN
Status: DISCONTINUED | OUTPATIENT
Start: 2024-01-31 | End: 2024-01-31 | Stop reason: ALTCHOICE

## 2024-01-31 RX ORDER — TRIAMTERENE AND HYDROCHLOROTHIAZIDE 37.5; 25 MG/1; MG/1
1 TABLET ORAL EVERY MORNING
Status: ON HOLD | COMMUNITY
Start: 2023-11-06 | End: 2024-02-02 | Stop reason: HOSPADM

## 2024-01-31 RX ORDER — MIDAZOLAM HYDROCHLORIDE 2 MG/2ML
2 INJECTION, SOLUTION INTRAMUSCULAR; INTRAVENOUS
Status: DISCONTINUED | OUTPATIENT
Start: 2024-01-31 | End: 2024-01-31

## 2024-01-31 RX ORDER — DROPERIDOL 2.5 MG/ML
0.62 INJECTION, SOLUTION INTRAMUSCULAR; INTRAVENOUS
Status: DISCONTINUED | OUTPATIENT
Start: 2024-01-31 | End: 2024-01-31

## 2024-01-31 RX ORDER — PROCHLORPERAZINE EDISYLATE 5 MG/ML
5 INJECTION INTRAMUSCULAR; INTRAVENOUS
Status: DISCONTINUED | OUTPATIENT
Start: 2024-01-31 | End: 2024-01-31

## 2024-01-31 RX ORDER — DEXAMETHASONE SODIUM PHOSPHATE 10 MG/ML
INJECTION INTRAMUSCULAR; INTRAVENOUS PRN
Status: DISCONTINUED | OUTPATIENT
Start: 2024-01-31 | End: 2024-01-31 | Stop reason: SDUPTHER

## 2024-01-31 RX ADMIN — ACETAMINOPHEN 1000 MG: 500 TABLET ORAL at 20:29

## 2024-01-31 RX ADMIN — FENTANYL CITRATE 25 MCG: 50 INJECTION, SOLUTION INTRAMUSCULAR; INTRAVENOUS at 16:46

## 2024-01-31 RX ADMIN — SODIUM CHLORIDE, PRESERVATIVE FREE 10 ML: 5 INJECTION INTRAVENOUS at 20:29

## 2024-01-31 RX ADMIN — GABAPENTIN 300 MG: 300 CAPSULE ORAL at 20:29

## 2024-01-31 RX ADMIN — OXYCODONE 5 MG: 5 TABLET ORAL at 00:34

## 2024-01-31 RX ADMIN — SODIUM CHLORIDE, POTASSIUM CHLORIDE, SODIUM LACTATE AND CALCIUM CHLORIDE: 600; 310; 30; 20 INJECTION, SOLUTION INTRAVENOUS at 00:32

## 2024-01-31 RX ADMIN — CALCIUM CHLORIDE 0.3 G: 100 INJECTION INTRAVENOUS; INTRAVENTRICULAR at 18:09

## 2024-01-31 RX ADMIN — CALCIUM CHLORIDE 0.2 G: 100 INJECTION INTRAVENOUS; INTRAVENTRICULAR at 17:52

## 2024-01-31 RX ADMIN — POTASSIUM PHOSPHATE, MONOBASIC POTASSIUM PHOSPHATE, DIBASIC 30 MMOL: 224; 236 INJECTION, SOLUTION, CONCENTRATE INTRAVENOUS at 07:49

## 2024-01-31 RX ADMIN — LIDOCAINE HYDROCHLORIDE 50 MG: 10 INJECTION, SOLUTION EPIDURAL; INFILTRATION; INTRACAUDAL; PERINEURAL at 15:33

## 2024-01-31 RX ADMIN — IBUPROFEN 400 MG: 400 TABLET, FILM COATED ORAL at 00:34

## 2024-01-31 RX ADMIN — Medication 100 MCG: at 16:00

## 2024-01-31 RX ADMIN — ROCURONIUM BROMIDE 10 MG: 10 INJECTION, SOLUTION INTRAVENOUS at 17:14

## 2024-01-31 RX ADMIN — METHOCARBAMOL 750 MG: 750 TABLET ORAL at 00:34

## 2024-01-31 RX ADMIN — DOCUSATE SODIUM 50 MG AND SENNOSIDES 8.6 MG 1 TABLET: 8.6; 5 TABLET, FILM COATED ORAL at 09:24

## 2024-01-31 RX ADMIN — ROCURONIUM BROMIDE 10 MG: 10 INJECTION, SOLUTION INTRAVENOUS at 16:35

## 2024-01-31 RX ADMIN — AMLODIPINE BESYLATE 10 MG: 10 TABLET ORAL at 09:24

## 2024-01-31 RX ADMIN — CALCIUM CHLORIDE 0.3 G: 100 INJECTION INTRAVENOUS; INTRAVENTRICULAR at 17:11

## 2024-01-31 RX ADMIN — METHOCARBAMOL 750 MG: 750 TABLET ORAL at 23:52

## 2024-01-31 RX ADMIN — FENTANYL CITRATE 100 MCG: 50 INJECTION, SOLUTION INTRAMUSCULAR; INTRAVENOUS at 15:33

## 2024-01-31 RX ADMIN — DOCUSATE SODIUM 50 MG AND SENNOSIDES 8.6 MG 1 TABLET: 8.6; 5 TABLET, FILM COATED ORAL at 20:29

## 2024-01-31 RX ADMIN — FENTANYL CITRATE 25 MCG: 50 INJECTION, SOLUTION INTRAMUSCULAR; INTRAVENOUS at 18:59

## 2024-01-31 RX ADMIN — ROCURONIUM BROMIDE 50 MG: 10 INJECTION, SOLUTION INTRAVENOUS at 15:36

## 2024-01-31 RX ADMIN — POTASSIUM CHLORIDE 10 MEQ: 1500 TABLET, EXTENDED RELEASE ORAL at 20:29

## 2024-01-31 RX ADMIN — Medication 0.5 MG: at 19:42

## 2024-01-31 RX ADMIN — FENTANYL CITRATE 25 MCG: 50 INJECTION, SOLUTION INTRAMUSCULAR; INTRAVENOUS at 16:49

## 2024-01-31 RX ADMIN — POTASSIUM CHLORIDE 10 MEQ: 7.46 INJECTION, SOLUTION INTRAVENOUS at 17:42

## 2024-01-31 RX ADMIN — SODIUM CHLORIDE, POTASSIUM CHLORIDE, SODIUM LACTATE AND CALCIUM CHLORIDE: 600; 310; 30; 20 INJECTION, SOLUTION INTRAVENOUS at 15:43

## 2024-01-31 RX ADMIN — METHOCARBAMOL 750 MG: 750 TABLET ORAL at 06:33

## 2024-01-31 RX ADMIN — FENTANYL CITRATE 25 MCG: 50 INJECTION, SOLUTION INTRAMUSCULAR; INTRAVENOUS at 17:06

## 2024-01-31 RX ADMIN — IBUPROFEN 400 MG: 400 TABLET, FILM COATED ORAL at 06:33

## 2024-01-31 RX ADMIN — CEFAZOLIN 2 G: 1 INJECTION, POWDER, FOR SOLUTION INTRAMUSCULAR; INTRAVENOUS at 15:47

## 2024-01-31 RX ADMIN — HYDROMORPHONE HYDROCHLORIDE 0.5 MG: 1 INJECTION, SOLUTION INTRAMUSCULAR; INTRAVENOUS; SUBCUTANEOUS at 19:42

## 2024-01-31 RX ADMIN — CALCIUM CHLORIDE 0.2 G: 100 INJECTION INTRAVENOUS; INTRAVENTRICULAR at 18:04

## 2024-01-31 RX ADMIN — SODIUM CHLORIDE, PRESERVATIVE FREE 10 ML: 5 INJECTION INTRAVENOUS at 20:28

## 2024-01-31 RX ADMIN — SODIUM CHLORIDE, POTASSIUM CHLORIDE, SODIUM LACTATE AND CALCIUM CHLORIDE: 600; 310; 30; 20 INJECTION, SOLUTION INTRAVENOUS at 20:34

## 2024-01-31 RX ADMIN — DEXAMETHASONE SODIUM PHOSPHATE 10 MG: 10 INJECTION INTRAMUSCULAR; INTRAVENOUS at 16:05

## 2024-01-31 RX ADMIN — ROCURONIUM BROMIDE 10 MG: 10 INJECTION, SOLUTION INTRAVENOUS at 17:47

## 2024-01-31 RX ADMIN — GABAPENTIN 300 MG: 300 CAPSULE ORAL at 06:33

## 2024-01-31 RX ADMIN — OXYCODONE 5 MG: 5 TABLET ORAL at 20:30

## 2024-01-31 RX ADMIN — Medication 2000 MG: at 23:52

## 2024-01-31 RX ADMIN — ONDANSETRON 4 MG: 2 INJECTION INTRAMUSCULAR; INTRAVENOUS at 18:22

## 2024-01-31 RX ADMIN — PROPOFOL 150 MG: 10 INJECTION, EMULSION INTRAVENOUS at 15:33

## 2024-01-31 ASSESSMENT — PAIN SCALES - GENERAL
PAINLEVEL_OUTOF10: 5
PAINLEVEL_OUTOF10: 6
PAINLEVEL_OUTOF10: 7
PAINLEVEL_OUTOF10: 8
PAINLEVEL_OUTOF10: 5
PAINLEVEL_OUTOF10: 8
PAINLEVEL_OUTOF10: 6
PAINLEVEL_OUTOF10: 5
PAINLEVEL_OUTOF10: 10

## 2024-01-31 ASSESSMENT — PAIN DESCRIPTION - ORIENTATION
ORIENTATION: MID
ORIENTATION: MID

## 2024-01-31 ASSESSMENT — PAIN DESCRIPTION - LOCATION
LOCATION: BACK

## 2024-01-31 ASSESSMENT — PAIN DESCRIPTION - PAIN TYPE
TYPE: SURGICAL PAIN

## 2024-01-31 ASSESSMENT — LIFESTYLE VARIABLES: SMOKING_STATUS: 1

## 2024-01-31 ASSESSMENT — PAIN - FUNCTIONAL ASSESSMENT: PAIN_FUNCTIONAL_ASSESSMENT: NONE - DENIES PAIN

## 2024-01-31 NOTE — ED NOTES
Pt belongings given to Mary, patient's sister-in-law in a bag and labeled with a sticker.  Pt is agreeable to this. Surgery notified by writer that patient is back from ECHO.  Pt family at bedside.

## 2024-01-31 NOTE — ED NOTES
Pt refusing to lay flat. Writer attempted to educate pt on risks of HOB elevated. Pt states \"hurts too bad to lay down\".

## 2024-01-31 NOTE — CONSULTS
HFA) 108 (90 Base) MCG/ACT inhaler Inhale 2 puffs into the lungs every 4 hours as needed for Wheezing 10/28/20   Harjeet Ngo PA-C       potassium phosphate IVPB (PERIPHERAL LINE), 30 mmol, IntraVENous, Once    sodium chloride flush, 5-40 mL, IntraVENous, 2 times per day    polyethylene glycol, 17 g, Oral, Daily    sennosides-docusate sodium, 1 tablet, Oral, BID    hydroCHLOROthiazide, 12.5 mg, Oral, Daily    amLODIPine, 10 mg, Oral, Daily    acetaminophen, 1,000 mg, Oral, 3 times per day    ibuprofen, 400 mg, Oral, Q6H    gabapentin, 300 mg, Oral, 3 times per day    methocarbamol, 750 mg, Oral, Q6H      Allergies:  Patient has no known allergies.    Social History:   reports that she has been smoking cigarettes. She started smoking about 61 years ago. She has a 30.5 pack-year smoking history. She has never used smokeless tobacco. She reports that she does not currently use alcohol. She reports that she does not currently use drugs.     Family History: family history includes Breast Cancer (age of onset: 65) in her paternal aunt. No h/o sudden cardiac death.    REVIEW OF SYSTEMS:    Constitutional: there has been no unanticipated weight loss. There's been No change in energy level, No change in activity level.     Eyes: No visual changes or diplopia. No scleral icterus.  ENT: No Headaches, hearing loss or vertigo. No mouth sores or sore throat.  Cardiovascular: per HPI  Respiratory: per HPI  Gastrointestinal: No abdominal pain, appetite loss, blood in stools. No change in bowel or bladder habits.  Genitourinary: No dysuria, trouble voiding, or hematuria.  Musculoskeletal:  back pain.  Integumentary: No rash or pruritis.  Neurological: No headache, diplopia, change in muscle strength, numbness or tingling. No change in gait, balance, coordination, mood, affect, memory, mentation, behavior.  Psychiatric: No anxiety, or depression.  Endocrine: No temperature intolerance. No excessive thirst, fluid  stratification   Does have murmur on exam with history of aortic stenosis  Will check 2D echo, if preserved LVEF, and no severe aortic stenosis, can proceed at moderate risk.    Can follow up as an outpatient    Discussed with patient in detail. All questions answered. Agrees with plan as outlined above.     Thank you for allowing me to participate in the care of this patient, please do not hesitate to call if you have any questions.    Rusty Mello DO, FACC, RPVI, WARD, TASHIA  [facility]  [facility]  [phone]

## 2024-01-31 NOTE — ED NOTES
Report received from Mag LOFTON, all questions answered. Pt remains on SpO2 and BP monitor. Care ongoing

## 2024-01-31 NOTE — ED NOTES
Pt placed on purewick for urination. Pt did not want to keep the purewick on. Purewick removed. Pt repositioned. Pt denies any needs at this time. NAD noted. Call light is in reach

## 2024-01-31 NOTE — PROGRESS NOTES
PROGRESS NOTE          PATIENT NAME: Kaylee Purvis  MEDICAL RECORD NO. [id number]  DATE: 1/31/2024    HD: # 1      Patient Active Problem List   Diagnosis    Essential hypertension    Murmur, cardiac    Hyperglycemia    Adrenal nodule (HCC)    Chronic pain of right knee    Nodule of lower lobe of left lung    Burst fracture of fourth thoracic vertebra (HCC)       DIAGNOSIS AND PLAN    Fall from 2 stairs w/ T4 burst fx  OR today w/ NS if clear from cards  ECHO per cards for hx murmur  PT/OT when clear per NS  NPO  IVF  Bilateral 1st rib and R second rib fx  PIC 10  No O2 requirements  Dispo  Pending post op and evals      Chief Complaint: \"I'm doing ok\"    SUBJECTIVE    RADHA, AVSS, pain controlled w/ mmpt and slight HOB up, no CP or SOB.     OBJECTIVE  VITALS:   Vitals:    01/31/24 1015   BP:    Pulse: 72   Resp:    Temp:    SpO2: 96%     Physical Exam  Constitutional:       General: She is not in acute distress.     Appearance: Normal appearance. She is normal weight.   HENT:      Head: Normocephalic and atraumatic.      Mouth/Throat:      Mouth: Mucous membranes are moist.      Pharynx: Oropharynx is clear.   Eyes:      General: No scleral icterus.     Extraocular Movements: Extraocular movements intact.   Cardiovascular:      Rate and Rhythm: Normal rate and regular rhythm.   Pulmonary:      Effort: Pulmonary effort is normal. No respiratory distress.      Breath sounds: No stridor.   Abdominal:      General: Abdomen is flat. There is no distension.      Palpations: Abdomen is soft.   Musculoskeletal:         General: Tenderness (upper thoracic spine) present.      Cervical back: Normal range of motion. No tenderness.   Skin:     General: Skin is warm and dry.      Capillary Refill: Capillary refill takes less than 2 seconds.   Neurological:      General: No focal deficit present.      Mental Status: She is alert and oriented to person, place, and time.           LAB:  CBC:   Recent Labs     01/30/24  1520  height and 5 mm of dorsal retropulsion.  Central canal narrowing appears mild at this level. 2. Acute fractures posteriorly at the right 1st and 2nd and left 1st ribs.     CT CERVICAL SPINE WO CONTRAST    Result Date: 1/30/2024  1. No acute cervical spine fracture. 2. Acute nondisplaced fractures posteriorly at the right 1st and 2nd and left 1st ribs.     CT FACIAL BONES WO CONTRAST    Result Date: 1/30/2024  1. No acute traumatic injury of the facial bones.          Joao Salvador DO  1/31/2024, 11:03 AM       Attestation signed by Sean Grover MD    I personally evaluated the patient and directed the medical decision making with Resident/AARON after the physical/radiologic exam and laboratory values were reviewed and confirmed.  Plan per NS, for echo.     Sean Grover MD

## 2024-01-31 NOTE — PROGRESS NOTES
Neurosurgery AARON/Resident    Daily Progress Note   CC:  Chief Complaint   Patient presents with    Fall     X 2 days, denies LOC, denies blood thinner    Back Pain    Facial Pain     nose     1/31/2024  7:24 AM    Chart reviewed.  No acute events overnight.  No new complaints.    Vitals:    01/31/24 0710 01/31/24 0716 01/31/24 0717 01/31/24 0720   BP:  (!) 141/64     Pulse: 68 69  65   Resp:       Temp:       TempSrc:       SpO2: 94% 95%  94%   Weight:   77.1 kg (170 lb)        PE:   AOx3   PERRL, EOMI  Motor   L deltoid 5/5; R deltoid 5/5  L biceps 5/5; R biceps 5/5  L triceps 5/5; R triceps 5/5  L wrist extension 5/5; R wrist extension 5/5  L intrinsics 5/5; R intrinsics 5/5      L iliopsoas 5/5 , R iliopsoas 5/5  L quadriceps 5/5; R quadriceps 5/5  L Dorsiflexion 5/5; R dorsiflexion 5/5  L Plantarflexion 5/5; R plantarflexion 5/5  L EHL 5/5; R EHL 5/5    Drift:  absent  Tone:  normal    Sensation: intact     Lab Results   Component Value Date    WBC 8.4 01/31/2024    HGB 12.5 01/31/2024    HCT 35.6 (L) 01/31/2024     01/31/2024    CHOL 219 (H) 07/25/2022    TRIG 97 07/25/2022    HDL 58 07/25/2022    ALT 10 07/25/2022    AST 14 07/25/2022     01/31/2024    K 3.5 (L) 01/31/2024     01/31/2024    CREATININE 0.4 (L) 01/31/2024    BUN 8 01/31/2024    CO2 27 01/31/2024    TSH 0.84 01/30/2024    INR 1.0 01/30/2024    LABA1C 5.2 01/30/2024       MRI THORACIC SPINE WO CONTRAST    Result Date: 1/30/2024  EXAMINATION: MRI OF THE THORACIC SPINE WITHOUT CONTRAST  1/30/2024 7:53 pm TECHNIQUE: Multiplanar multisequence MRI of the thoracic spine was performed without the administration of intravenous contrast. COMPARISON: CT scan 01/30/2024. HISTORY: ORDERING SYSTEM PROVIDED HISTORY: T4 fracture TECHNOLOGIST PROVIDED HISTORY: T4 fracture What is the sedation requirement?->None Decision Support Exception - unselect if not a suspected or confirmed emergency medical condition->Emergency Medical Condition (MA)

## 2024-01-31 NOTE — ED NOTES
Pt is updated on plan of care, given warm blankets. Pt updated of NPO status d/t pending surgery this afternoon. Pt denies further needs at this time. Will continue to monitor.

## 2024-01-31 NOTE — PROGRESS NOTES
Assessment:  responded to a request for POA paperwork and family support. Patient was awake and alert at the time. Family was present. Patient looked anxious and worried. When asked how she was feeling, patient responded, \"not good.\" Patient said she was scheduled to undergo surgery this afternoon. Patient declined POA paperwork.  Patient said she was raised [employer].   Intervention:  provided ministry of presence, offered support, prayed with patient and family and reassured them that they were in good hands.   Outcome: Patient and family expressed appreciation for the spiritual and emotional support they received.  Plan: Follow up visits recommended for ongoing assessment of patient's condition and for more prayers and support.

## 2024-01-31 NOTE — ED NOTES
Pre-Op calls and is looking for patient. They are updated that patient is still at testing at ECHO.

## 2024-01-31 NOTE — ED NOTES
ED to inpatient nurses report      Chief Complaint:  Chief Complaint   Patient presents with    Fall     X 2 days, denies LOC, denies blood thinner    Back Pain    Facial Pain     nose     Present to ED from: home via triage after a fall on Sunday. Pt only complaint is back pain. Pt states she lost her balance and fell down three stairs and hit her nose/face on the wall. Pt presents with bruising under both eyes. Pt denies loss of consciousness and blood thinners. Pt denies neck pain or numbness or tingling.    MOA:     LOC: Alert and orientated to name, place, date  Mobility: Bedrest  Oxygen Baseline: 98% RA    Current needs required: None   Pending ED orders: None  Present condition: Stable    Why did the patient come to the ED?   Fall   What is the plan?   Surgery scheduled for 1400, pain management   Any procedures or intervention occur?  Labs, xray, CT, MRI  Any safety concerns??   Fall risk    Mental Status:       Psych Assessment:   Psychosocial  Psychosocial (WDL): Within Defined Limits  Vital signs   Vitals:    01/31/24 0145 01/31/24 0156 01/31/24 0200 01/31/24 0238   BP: 117/61  (!) 124/53    Pulse:  65  60   Resp:       Temp:       TempSrc:       SpO2:  93%  93%        Vitals:  Patient Vitals for the past 24 hrs:   BP Temp Temp src Pulse Resp SpO2   01/31/24 0238 -- -- -- 60 -- 93 %   01/31/24 0200 (!) 124/53 -- -- -- -- --   01/31/24 0156 -- -- -- 65 -- 93 %   01/31/24 0145 117/61 -- -- -- -- --   01/31/24 0122 -- -- -- 62 -- 95 %   01/31/24 0115 130/65 -- -- -- -- --   01/31/24 0100 (!) 141/68 -- -- 61 -- 96 %   01/31/24 0037 -- -- -- 78 -- 96 %   01/31/24 0030 (!) 140/58 -- -- -- -- --   01/31/24 0000 (!) 140/64 -- -- 81 -- 95 %   01/30/24 2309 -- -- -- 66 -- 95 %   01/30/24 2247 (!) 127/92 -- -- 77 -- 95 %   01/30/24 2123 (!) 149/67 -- -- -- -- --   01/30/24 1730 (!) 147/58 -- -- -- -- 98 %   01/30/24 1700 134/63 -- -- -- -- 96 %   01/30/24 1630 (!) 145/62 -- -- -- -- 96 %   01/30/24 1600 (!) 146/66  Inpatient Protocol       SURGICAL HISTORY     History reviewed. No pertinent surgical history.    PAST MEDICAL HISTORY       Past Medical History:   Diagnosis Date    Hypertension        Labs:  Labs Reviewed   CBC WITH AUTO DIFFERENTIAL - Abnormal; Notable for the following components:       Result Value    MCHC 35.7 (*)     Neutrophils % 78 (*)     Lymphocytes % 17 (*)     Eosinophils % 0 (*)     All other components within normal limits   BASIC METABOLIC PANEL - Abnormal; Notable for the following components:    Potassium 3.0 (*)     Chloride 97 (*)     Anion Gap 20 (*)     Glucose 140 (*)     All other components within normal limits   TROPONIN - Abnormal; Notable for the following components:    Troponin, High Sensitivity 15 (*)     All other components within normal limits   URINE DRUG SCREEN - Abnormal; Notable for the following components:    Opiates, Urine POSITIVE (*)     All other components within normal limits   MAGNESIUM   TROPONIN   PROTIME-INR   APTT   ALBUMIN   VITAMIN B12 & FOLATE   T4, FREE   HEMOGLOBIN A1C   TSH   VITAMIN D 25 HYDROXY   ETHANOL   BASIC METABOLIC PANEL W/ REFLEX TO MG FOR LOW K   CBC WITH AUTO DIFFERENTIAL   MAGNESIUM   PHOSPHORUS       Electronically signed by Zakiya Kang RN on 1/31/2024 at 2:47 AM

## 2024-01-31 NOTE — PROGRESS NOTES
Trauma Tertiary Survey    Admit Date: 1/30/2024  Hospital day 0      Subjective:     Patient seen and evaluated at bedside. Complaining of back pain. VSS and afebrile.    Objective:   PHYSICAL EXAM:   Physical Exam  Vitals and nursing note reviewed.   Constitutional:       General: She is not in acute distress.     Appearance: She is well-developed. She is not diaphoretic.   HENT:      Head: Normocephalic.      Comments: B/l periorbital ecchymosis present     Nose: Nose normal.   Eyes:      Pupils: Pupils are equal, round, and reactive to light.   Cardiovascular:      Rate and Rhythm: Normal rate and regular rhythm.      Pulses:           Radial pulses are 2+ on the right side and 2+ on the left side.        Dorsalis pedis pulses are 2+ on the right side and 2+ on the left side.      Heart sounds: Normal heart sounds.   Pulmonary:      Effort: Pulmonary effort is normal. No respiratory distress.      Breath sounds: Normal breath sounds.   Abdominal:      Palpations: Abdomen is soft.      Tenderness: There is no abdominal tenderness.   Musculoskeletal:         General: Tenderness present. Normal range of motion.   Skin:     General: Skin is warm and dry.      Findings: No rash.   Neurological:      Mental Status: She is alert and oriented to person, place, and time.           Spine:     Spine Tenderness ROM   Cervical 0 /10 Normal   Thoracic 6 /10 Abnormal, decreased   Lumbar 0 /10 Normal     Musculoskeletal    Joint Tenderness Swelling ROM   Right shoulder absent absent normal   Left shoulder absent absent normal   Right elbow absent absent normal   Left elbow absent absent normal   Right wrist absent absent normal   Left wrist absent absent normal   Right hand grasp absent absent normal   Left hand grasp absent absent normal   Right hip absent absent normal   Left hip absent absent normal   Right knee absent absent normal   Left knee absent absent normal   Right ankle absent absent normal   Left ankle absent

## 2024-01-31 NOTE — DISCHARGE INSTRUCTIONS
01/31/24    Kaylee Purvis    A CT scan was performed during your stay in the hospital.  A radiologist reviewed these images and has encountered a finding that may be important to you and your primary care provider.      The findings of your CT scan that may require further testing include:    Thyroid gland is diffusely enlarged  containing small hypodense nodules measuring up to 1.1 cm size. These nodules should be followed by your primary care physician with a non-emergent outpatient ultrasound.    Joao Salvador, DO    Discharge Instructions for Trauma       What to do after you leave the hospital:      Please continue to use your Incentive Spirometer as directed.  You can practice 10 deep breaths/hour while awake.   Using the Incentive Spirometer will promote the health of your lungs by taking slow, deep breaths in.  It is also important in preventing pneumonia or a pneumothorax from developing.       For resources transitioning back to the community following your trauma, visit [email or web address]    General questions or concerns please call the [facility] at [phone].  If needed, the clinic fax number is [phone].    Trauma is a life-threatening condition. Your doctor will want to closely monitor you. Be sure to go to all of your appointments.                Lumbar Spine Surgery Discharge Instructions     Thank you for choosing [facility] and [facility] for your surgical needs. The following instructions will help to ensure your comfort and that you are well prepared after your surgery.     Post-Operative Visit:     Diet:   You may resume your regular diet.   Be sure to eat a well-balanced diet. Protein promotes wound healing.   Pain medication and decreased activity can cause constipation. Drink 8-10 glasses of water a day, eat fresh fruits and vegetables, and add prunes, raisins and bran cereals to your  diet if you do become constipated. A stool softener taken 1-2 times a day is helpful. Dulcolax suppositories or Fleets enemas are also available without a prescription. Call our office if the problem continues.     Activity and Exercise:   No driving until you are seen in the office.   Avoid riding in a car for the first two weeks until you come to the office for your scheduled follow-up.   Start taking short, frequent walks in the beginning. [place], more frequent walks throughout the day are more beneficial than one long walk each day. You may gradually increase the distance; as tolerated. Your brace will help give support to your muscles while you walk.   If your pain increases, you may be walking too much or too far. Try backing off for a day or two and then resume slowly.   No lifting greater than 5 lbs (gallon of milk). No bending at the waist. Instead, bend at the knees and squat to pick something up.   If physical therapy has been prescribed, you are not to perform range of motion, flexion, extension or lateral bending.   No baths, swimming or hot tub until you discuss this with your doctor.     Brace:   If a back brace is prescribed, it should be worn at all times, except while showering and should be replaced immediately thereafter.     Incision Care and Hygiene:   Your incision may be may be closed with sutures Steri-strips, staples, or glue.    - The Steri-strips will fall off on their own in 7-10 days    - The staples or sutures should be removed about 2 weeks after surgery. If they are not removed please call the clinic to have them removed.    - The glue will dissolve over time   No ointments or lotions on the incision   It is OK to shower 3-4 days after surgery. Let water run over the incision. Gently pat the incision dry with a clean towel, do not rub. Leave incision site open to air.     Pain Management:   Do not take NSAID medications (Ibuprofen, Naprosyn, etc.) or Liz-2 inhibitors (Celebrex,

## 2024-01-31 NOTE — CONSULTS
fragment into the canal space is by 4 mm.  There  is mild canal stenosis with indentation on the ventral surface of the spinal  cord.  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  Multilevel  endplate degenerative changes are seen, resulting in mild canal stenosis at  T6-T7, T7-T8 levels.  No significant foraminal narrowing is seen.  There is  fluid in the T3-T4, T4-T5 interspinous space and edema in the paraspinal  musculature.    SPINAL CORD: Thoracic spinal cord has normal signal intensity.  Retropulsion  of fracture fragment at T4 body results in indentation of the ventral surface  of the spinal cord with mild flattening.  No abnormal T2 signal is seen.  The  spinal cord terminates at lower border of L1.    Impression  1. Acute burst fracture of T4 vertebral body with 60% height loss and 4 mm  retropulsion of fracture fragment into the canal space. There is mild canal  stenosis with indentation of the ventral surface of the spinal cord with mild  flattening. No abnormal signal within the cord.  2. Fluid in the T3-T4, T4-T5 interspinous space and edema in the paraspinal  musculature suggesting ligamentous injury.      No results found for this or any previous visit.       Encounter Date: 01/30/24   EKG 12 Lead   Result Value    Ventricular Rate 78    Atrial Rate 78    P-R Interval 162    QRS Duration 84    Q-T Interval 392    QTc Calculation (Bazett) 446    P Axis 69    R Axis 25    T Axis 38    Narrative    Normal sinus rhythm  Normal ECG  When compared with ECG of 09-SEP-2005 14:17,  No significant change was found        Code Status: Full Code     ADVANCED CARE PLANNING:  Patient has capacity for medical decisions: yes  Health Care Power of : no  Living Will: no     Personal, Social, and Family History  Marital Status:   Living  Extensive disease; Total care; intake reduced; LOCfull/confusion  ___20%  Bed Bound; Extensive disease; Total care; intake minimal; Drowsy/coma  ___10%  Bed Bound; Extensive disease; Total care; Mouth care only; Drowsy/coma  ___0       Death        Thank you for allowing Palliative Care to participate in the care of Ms. Purvis .    This note has been dictated by dragon, typing errors may be a possibility.    The total time I spent in seeing the patient, discussing goals of care, advanced directives, code status and other major issues was more than 60 minutes      Electronically signed by   TRACE Casillas CNP  Palliative Care Team  on 1/31/2024 at 8:49 AM    Palliative Care can be reached via perfect serve.

## 2024-01-31 NOTE — ANESTHESIA PRE PROCEDURE
Department of Anesthesiology  Preprocedure Note       Name:  Kaylee Purvis   Age:  73 y.o.  :  1950                                          MRN:  [id number]         Date:  2024      Surgeon: Surgeon(s):  David Molina MD    Procedure: Procedure(s):  T2-T6 POSTERIOR INSTRUMENTATION FUSION POSSIBLE DECOMPRESSION**C-ARM, O-ARM, SHASHI SPINE, MEDTRONIC NOTIFIED PER MONICA**    Medications prior to admission:   Prior to Admission medications    Medication Sig Start Date End Date Taking? Authorizing Provider   amLODIPine (NORVASC) 10 MG tablet Take 1 tablet by mouth daily 22   Alejandro Carole N, APRN - CNP   potassium chloride (KLOR-CON M) 10 MEQ extended release tablet Take 1 tablet by mouth in the morning and 1 tablet before bedtime. 22   Karina Grace APRN - CNP   hydroCHLOROthiazide (HYDRODIURIL) 12.5 MG tablet Take 1 tablet by mouth daily 22   Karina Grace APRN - CNP   alendronate (FOSAMAX) 70 MG tablet Take 1 tablet by mouth every 7 days 22   Karina Grace APRN - CNP   diclofenac (VOLTAREN) 25 MG EC tablet TAKE 1 TABLET BY MOUTH 2 TIMES DAILY AS NEEDED FOR PAIN 4/15/21   Karina Grace APRN - CNP   aspirin 81 MG EC tablet Take 81 mg by mouth daily    Provider, MD Leonidas   albuterol sulfate HFA (PROAIR HFA) 108 (90 Base) MCG/ACT inhaler Inhale 2 puffs into the lungs every 4 hours as needed for Wheezing 10/28/20   Harjeet Ngo PA-C       Current medications:    Current Facility-Administered Medications   Medication Dose Route Frequency Provider Last Rate Last Admin   • potassium phosphate 30 mmol in sodium chloride 0.9 % 500 mL IVPB  30 mmol IntraVENous Once Joao Salvador DO 83.3 mL/hr at 24 0749 30 mmol at 24 0749   • sodium chloride flush 0.9 % injection 5-40 mL  5-40 mL IntraVENous 2 times per day Peyman Rollins MD   10 mL at 24   • sodium chloride flush 0.9 % injection 5-40 mL  5-40 mL IntraVENous PRN Peyman Rollins MD       •

## 2024-01-31 NOTE — PROGRESS NOTES
[facility] [place]    PATIENT NAME: Kaylee Purvis    Shift date: 1/30/2024  Shift day: Tuesday   Shift # 2    Room # 07/07   Name: Kaylee Purvis            Age: 73 y.o.  Gender: female          Zoroastrianism: Gnosticism   Place of Taoism:     Trauma/Incident type: Adult Trauma Consult  Admit Date & Time: 1/30/2024  2:53 PM  TRAUMA NAME: N/A    ADVANCE DIRECTIVES IN CHART?  No    NAME OF DECISION MAKER: N/A    RELATIONSHIP OF DECISION MAKER TO PATIENT: N/A    PATIENT/EVENT DESCRIPTION:  Kaylee Purvis is a 73 y.o. female who arrived at [facility].  received perfect serve for trauma consult to ED 07.Pt to be admitted to 07/07.         SPIRITUAL ASSESSMENT-INTERVENTION-OUTCOME:   was a ministry of presence to patient and medical staff. Upon returning, writer introduced self as . Patient did not appear to mind  presence and engaged in conversation. Patient stated she \"wouldn't wish this pain on her worst enemy\". Patient appeared anxious and coping when discussing recent events which led to her hospital visit.  provided a supportive presence through active listening and words of affirmation.      PATIENT BELONGINGS:   writing did not handle patient belongings    ANY BELONGINGS OF SIGNIFICANT VALUE NOTED:  N/A    REGISTRATION STAFF NOTIFIED?  Yes      WHAT IS YOUR SPIRITUAL CARE PLAN FOR THIS PATIENT?:  Chaplains will remain available to offer spiritual and emotional support as needed.     Electronically signed by Chaplain Amie, on 1/30/2024 at 8:11 PM.  [facility]  [phone]

## 2024-01-31 NOTE — ED NOTES
ED to inpatient nurses report      Chief Complaint:  Chief Complaint   Patient presents with    Fall     X 2 days, denies LOC, denies blood thinner    Back Pain    Facial Pain     nose     Present to ED from: home via triage after a fall on Sunday. Pt only complaint is back pain. Pt states she lost her balance and fell down three stairs and hit her nose/face on the wall. Pt presents with bruising under both eyes. Pt denies loss of consciousness and blood thinners. Pt denies neck pain or numbness or tingling.    MOA:     LOC: alert and orientated to name, place, date  Mobility: Requires assistance * 1  Oxygen Baseline: 98% RA    Current needs required: None   Pending ED orders: None  Present condition: Stable    Why did the patient come to the ED? Fall   What is the plan? Trauma/neuro surge to follow, pain management   Any procedures or intervention occur? Labs, xray, CT, MRI  Any safety concerns?? Fall risk    Mental Status:       Psych Assessment:   Psychosocial  Psychosocial (WDL): Within Defined Limits  Vital signs   Vitals:    01/30/24 1630 01/30/24 1700 01/30/24 1730 01/30/24 2123   BP: (!) 145/62 134/63 (!) 147/58 (!) 149/67   Pulse:       Resp:       Temp:       TempSrc:       SpO2: 96% 96% 98%         Vitals:  Patient Vitals for the past 24 hrs:   BP Temp Temp src Pulse Resp SpO2   01/30/24 2123 (!) 149/67 -- -- -- -- --   01/30/24 1730 (!) 147/58 -- -- -- -- 98 %   01/30/24 1700 134/63 -- -- -- -- 96 %   01/30/24 1630 (!) 145/62 -- -- -- -- 96 %   01/30/24 1600 (!) 146/66 -- -- -- -- 96 %   01/30/24 1530 (!) 140/55 -- -- -- -- 96 %   01/30/24 1500 (!) 144/64 -- -- -- -- --   01/30/24 1441 (!) 142/66 97.4 °F (36.3 °C) Oral 78 16 98 %      Visit Vitals  BP (!) 149/67   Pulse 78   Temp 97.4 °F (36.3 °C) (Oral)   Resp 16   SpO2 98%        LDAs:   Peripheral IV 01/30/24 Right Forearm (Active)   Site Assessment Clean, dry & intact 01/30/24 1500   Line Status Blood return noted 01/30/24 1500       Ambulatory

## 2024-01-31 NOTE — ED NOTES
ED to inpatient nurses report      Chief Complaint:  Chief Complaint   Patient presents with    Fall     X 2 days, denies LOC, denies blood thinner    Back Pain    Facial Pain     nose     Present to ED from: home after falling on Saturday, denies LOC, no blood thinners, c/o nose and face pain.     MOA:     LOC: alert and orientated to name, place, date  Mobility: Requires assistance * 1  Oxygen Baseline: RA    Current needs required: RA   Pending ED orders: PENDING SURGERY  Present condition: A&OX4, resting comfortably on stretcher, NAD. Pt is on full monitor, on purewick.     Why did the patient come to the ED? Falls  What is the plan? Surgery, multiple fx   Any procedures or intervention occur? PENDING SURGERY  Any safety concerns?? NA    Mental Status:       Psych Assessment:   Psychosocial  Psychosocial (WDL): Within Defined Limits  Vital signs   Vitals:    01/31/24 0600 01/31/24 0601 01/31/24 0603 01/31/24 0615   BP: (!) 128/44      Pulse: 79  64 66   Resp:       Temp:       TempSrc:       SpO2:  96% 94% 93%        Vitals:  Patient Vitals for the past 24 hrs:   BP Temp Temp src Pulse Resp SpO2   01/31/24 0615 -- -- -- 66 -- 93 %   01/31/24 0603 -- -- -- 64 -- 94 %   01/31/24 0601 -- -- -- -- -- 96 %   01/31/24 0600 (!) 128/44 -- -- 79 -- --   01/31/24 0545 (!) 106/55 -- -- 69 -- 94 %   01/31/24 0530 (!) 123/57 -- -- 64 -- 94 %   01/31/24 0510 -- -- -- 65 -- --   01/31/24 0509 -- -- -- -- -- 93 %   01/31/24 0330 131/60 -- -- -- -- 93 %   01/31/24 0320 -- -- -- 61 -- --   01/31/24 0310 (!) 119/58 -- -- 63 -- 93 %   01/31/24 0301 -- -- -- 61 -- 94 %   01/31/24 0300 (!) 121/56 -- -- 60 -- 93 %   01/31/24 0238 -- -- -- 60 -- 93 %   01/31/24 0200 (!) 124/53 -- -- -- -- --   01/31/24 0156 -- -- -- 65 -- 93 %   01/31/24 0145 117/61 -- -- -- -- --   01/31/24 0122 -- -- -- 62 -- 95 %   01/31/24 0115 130/65 -- -- -- -- --   01/31/24 0100 (!) 141/68 -- -- 61 -- 96 %   01/31/24 0037 -- -- -- 78 -- 96 %   01/31/24 0030 (!)

## 2024-02-01 LAB
ANION GAP SERPL CALCULATED.3IONS-SCNC: 10 MMOL/L (ref 9–17)
BASOPHILS # BLD: <0.03 K/UL (ref 0–0.2)
BASOPHILS NFR BLD: 0 % (ref 0–2)
BUN SERPL-MCNC: 6 MG/DL (ref 8–23)
CALCIUM SERPL-MCNC: 8.8 MG/DL (ref 8.6–10.4)
CHLORIDE SERPL-SCNC: 101 MMOL/L (ref 98–107)
CO2 SERPL-SCNC: 22 MMOL/L (ref 20–31)
CREAT SERPL-MCNC: 0.4 MG/DL (ref 0.5–0.9)
EOSINOPHIL # BLD: <0.03 K/UL (ref 0–0.44)
EOSINOPHILS RELATIVE PERCENT: 0 % (ref 1–4)
ERYTHROCYTE [DISTWIDTH] IN BLOOD BY AUTOMATED COUNT: 12.8 % (ref 11.8–14.4)
GFR SERPL CREATININE-BSD FRML MDRD: >60 ML/MIN/1.73M2
GLUCOSE SERPL-MCNC: 150 MG/DL (ref 70–99)
HCT VFR BLD AUTO: 32.1 % (ref 36.3–47.1)
HGB BLD-MCNC: 11.5 G/DL (ref 11.9–15.1)
IMM GRANULOCYTES # BLD AUTO: 0.05 K/UL (ref 0–0.3)
IMM GRANULOCYTES NFR BLD: 1 %
LYMPHOCYTES NFR BLD: 0.76 K/UL (ref 1.1–3.7)
LYMPHOCYTES RELATIVE PERCENT: 10 % (ref 24–43)
MAGNESIUM SERPL-MCNC: 1.6 MG/DL (ref 1.6–2.6)
MCH RBC QN AUTO: 31.3 PG (ref 25.2–33.5)
MCHC RBC AUTO-ENTMCNC: 35.8 G/DL (ref 28.4–34.8)
MCV RBC AUTO: 87.5 FL (ref 82.6–102.9)
MONOCYTES NFR BLD: 0.32 K/UL (ref 0.1–1.2)
MONOCYTES NFR BLD: 4 % (ref 3–12)
NEUTROPHILS NFR BLD: 85 % (ref 36–65)
NEUTS SEG NFR BLD: 6.76 K/UL (ref 1.5–8.1)
NRBC BLD-RTO: 0 PER 100 WBC
PHOSPHATE SERPL-MCNC: 3.4 MG/DL (ref 2.6–4.5)
PLATELET # BLD AUTO: 218 K/UL (ref 138–453)
PMV BLD AUTO: 10.8 FL (ref 8.1–13.5)
POTASSIUM SERPL-SCNC: 4 MMOL/L (ref 3.7–5.3)
RBC # BLD AUTO: 3.67 M/UL (ref 3.95–5.11)
SODIUM SERPL-SCNC: 133 MMOL/L (ref 135–144)
WBC OTHER # BLD: 7.9 K/UL (ref 3.5–11.3)

## 2024-02-01 PROCEDURE — 99232 SBSQ HOSP IP/OBS MODERATE 35: CPT | Performed by: SURGERY

## 2024-02-01 PROCEDURE — 2580000003 HC RX 258: Performed by: NEUROLOGICAL SURGERY

## 2024-02-01 PROCEDURE — 1200000000 HC SEMI PRIVATE

## 2024-02-01 PROCEDURE — 97162 PT EVAL MOD COMPLEX 30 MIN: CPT

## 2024-02-01 PROCEDURE — 97166 OT EVAL MOD COMPLEX 45 MIN: CPT

## 2024-02-01 PROCEDURE — 2580000003 HC RX 258: Performed by: ANESTHESIOLOGY

## 2024-02-01 PROCEDURE — 6360000002 HC RX W HCPCS: Performed by: NEUROLOGICAL SURGERY

## 2024-02-01 PROCEDURE — 6370000000 HC RX 637 (ALT 250 FOR IP)

## 2024-02-01 PROCEDURE — 99231 SBSQ HOSP IP/OBS SF/LOW 25: CPT

## 2024-02-01 PROCEDURE — 83735 ASSAY OF MAGNESIUM: CPT

## 2024-02-01 PROCEDURE — 6370000000 HC RX 637 (ALT 250 FOR IP): Performed by: NEUROLOGICAL SURGERY

## 2024-02-01 PROCEDURE — 36415 COLL VENOUS BLD VENIPUNCTURE: CPT

## 2024-02-01 PROCEDURE — 6360000002 HC RX W HCPCS: Performed by: STUDENT IN AN ORGANIZED HEALTH CARE EDUCATION/TRAINING PROGRAM

## 2024-02-01 PROCEDURE — 85025 COMPLETE CBC W/AUTO DIFF WBC: CPT

## 2024-02-01 PROCEDURE — 97530 THERAPEUTIC ACTIVITIES: CPT

## 2024-02-01 PROCEDURE — 84100 ASSAY OF PHOSPHORUS: CPT

## 2024-02-01 PROCEDURE — 99233 SBSQ HOSP IP/OBS HIGH 50: CPT | Performed by: INTERNAL MEDICINE

## 2024-02-01 PROCEDURE — APPSS30 APP SPLIT SHARED TIME 16-30 MINUTES: Performed by: PHYSICIAN ASSISTANT

## 2024-02-01 PROCEDURE — 80048 BASIC METABOLIC PNL TOTAL CA: CPT

## 2024-02-01 RX ORDER — OXYCODONE HYDROCHLORIDE 5 MG/1
5 TABLET ORAL EVERY 4 HOURS PRN
Status: DISCONTINUED | OUTPATIENT
Start: 2024-02-01 | End: 2024-02-02 | Stop reason: HOSPADM

## 2024-02-01 RX ORDER — MAGNESIUM SULFATE IN WATER 40 MG/ML
4000 INJECTION, SOLUTION INTRAVENOUS ONCE
Status: COMPLETED | OUTPATIENT
Start: 2024-02-01 | End: 2024-02-01

## 2024-02-01 RX ADMIN — AMLODIPINE BESYLATE 10 MG: 10 TABLET ORAL at 08:55

## 2024-02-01 RX ADMIN — Medication 2000 MG: at 08:56

## 2024-02-01 RX ADMIN — DOCUSATE SODIUM 50 MG AND SENNOSIDES 8.6 MG 1 TABLET: 8.6; 5 TABLET, FILM COATED ORAL at 20:15

## 2024-02-01 RX ADMIN — SODIUM CHLORIDE, POTASSIUM CHLORIDE, SODIUM LACTATE AND CALCIUM CHLORIDE: 600; 310; 30; 20 INJECTION, SOLUTION INTRAVENOUS at 07:58

## 2024-02-01 RX ADMIN — SODIUM CHLORIDE, PRESERVATIVE FREE 10 ML: 5 INJECTION INTRAVENOUS at 20:15

## 2024-02-01 RX ADMIN — OXYCODONE 5 MG: 5 TABLET ORAL at 08:58

## 2024-02-01 RX ADMIN — OXYCODONE 5 MG: 5 TABLET ORAL at 17:16

## 2024-02-01 RX ADMIN — ACETAMINOPHEN 1000 MG: 500 TABLET ORAL at 20:15

## 2024-02-01 RX ADMIN — POLYETHYLENE GLYCOL 3350 17 G: 17 POWDER, FOR SOLUTION ORAL at 08:55

## 2024-02-01 RX ADMIN — ACETAMINOPHEN 1000 MG: 500 TABLET ORAL at 13:37

## 2024-02-01 RX ADMIN — GABAPENTIN 300 MG: 300 CAPSULE ORAL at 20:15

## 2024-02-01 RX ADMIN — METHOCARBAMOL 750 MG: 750 TABLET ORAL at 05:36

## 2024-02-01 RX ADMIN — MAGNESIUM SULFATE HEPTAHYDRATE 2000 MG: 40 INJECTION, SOLUTION INTRAVENOUS at 08:06

## 2024-02-01 RX ADMIN — METHOCARBAMOL 750 MG: 750 TABLET ORAL at 17:16

## 2024-02-01 RX ADMIN — POTASSIUM CHLORIDE 10 MEQ: 1500 TABLET, EXTENDED RELEASE ORAL at 08:55

## 2024-02-01 RX ADMIN — POTASSIUM CHLORIDE 10 MEQ: 1500 TABLET, EXTENDED RELEASE ORAL at 20:18

## 2024-02-01 RX ADMIN — ACETAMINOPHEN 1000 MG: 500 TABLET ORAL at 05:36

## 2024-02-01 RX ADMIN — METHOCARBAMOL 750 MG: 750 TABLET ORAL at 11:29

## 2024-02-01 RX ADMIN — OXYCODONE 5 MG: 5 TABLET ORAL at 03:36

## 2024-02-01 RX ADMIN — GABAPENTIN 300 MG: 300 CAPSULE ORAL at 13:37

## 2024-02-01 RX ADMIN — GABAPENTIN 300 MG: 300 CAPSULE ORAL at 05:35

## 2024-02-01 RX ADMIN — DOCUSATE SODIUM 50 MG AND SENNOSIDES 8.6 MG 1 TABLET: 8.6; 5 TABLET, FILM COATED ORAL at 08:55

## 2024-02-01 RX ADMIN — HYDROCHLOROTHIAZIDE 12.5 MG: 25 TABLET ORAL at 08:55

## 2024-02-01 ASSESSMENT — PAIN SCALES - GENERAL
PAINLEVEL_OUTOF10: 7
PAINLEVEL_OUTOF10: 8
PAINLEVEL_OUTOF10: 8
PAINLEVEL_OUTOF10: 5
PAINLEVEL_OUTOF10: 7
PAINLEVEL_OUTOF10: 7
PAINLEVEL_OUTOF10: 8
PAINLEVEL_OUTOF10: 7
PAINLEVEL_OUTOF10: 10

## 2024-02-01 ASSESSMENT — PAIN DESCRIPTION - FREQUENCY
FREQUENCY: CONTINUOUS
FREQUENCY: CONTINUOUS

## 2024-02-01 ASSESSMENT — PAIN DESCRIPTION - DESCRIPTORS
DESCRIPTORS: THROBBING
DESCRIPTORS: BURNING
DESCRIPTORS: DISCOMFORT;ACHING
DESCRIPTORS: ACHING;DISCOMFORT
DESCRIPTORS: THROBBING

## 2024-02-01 ASSESSMENT — PAIN DESCRIPTION - LOCATION
LOCATION: BACK

## 2024-02-01 ASSESSMENT — PAIN DESCRIPTION - PAIN TYPE
TYPE: SURGICAL PAIN

## 2024-02-01 ASSESSMENT — PAIN DESCRIPTION - ORIENTATION
ORIENTATION: MID
ORIENTATION: MID

## 2024-02-01 NOTE — PROGRESS NOTES
Advance Care Planning     Advance Care Planning Inpatient Note  Spiritual Care Department    Today's Date: 2/1/2024  Unit: [facility] ORTHO/MED SURG    Received request from HealthCare Provider.  Upon review of chart and communication with care team, Spiritual Care will defer advance care planning with patient at this time.. Patient declined to fill ot paper work at this.  asked if patient desire that paper work be left to be completed later, patient said yes.    Goals of ACP Conversation:       Health Care Decision Makers:       Summary:  No Decision Maker named by patient at this time    Advance Care Planning Documents (Patient Wishes):         Assessment:  Patient was very reserved. Not receptive of visit at the time.    Interventions:  Patient DECLINED ACP conversation    Care Preferences Communicated:        Outcomes/Plan:      Electronically signed by Chaplain Marie   02/01/24 1454   Encounter Summary   Encounter Overview/Reason  Advance Care Planning  (patient decilned  to fill out ACP paper work but aked  to leave them with her.)   Advance Care Planning   Type Refused ACP conversation      on 2/1/2024 at 2:50 PM

## 2024-02-01 NOTE — PROGRESS NOTES
Physical Therapy  Facility/Department: [address] ORTHO/MED SURG  Physical Therapy Initial Assessment    Name: Kaylee Purvis  : 1950  MRN: [id number]  Date of Service: 2024    Discharge Recommendations:  Patient would benefit from continued therapy after discharge          Patient Diagnosis(es): The primary encounter diagnosis was Closed fracture of multiple ribs of both sides, initial encounter. Diagnoses of Murmur, cardiac and Compression fracture of T4 vertebra, initial encounter (formerly [facility]) were also pertinent to this visit.  Past Medical History:  has a past medical history of Hypertension.  Past Surgical History:  has a past surgical history that includes Thoracic spine surgery (2024) and lumbar fusion (N/A, 2024).    Assessment   Assessment: The pt ambulated 50 ft with a RW x CGA.She required min to mod assist for transfers and bed mobility. She reported increased pain with mobilization.  She could benefit from a continuation of PT for gait , strengthening and functional mobility prior to her DC  Therapy Prognosis: Good  Decision Making: Medium Complexity  Requires PT Follow-Up: Yes  Activity Tolerance  Activity Tolerance: Patient limited by fatigue;Patient limited by endurance     Plan   Physical Therapy Plan  General Plan: 6-7 times per week  Current Treatment Recommendations: Strengthening, Balance training, Functional mobility training, Transfer training, Gait training, Endurance training, Stair training, Safety education & training, Therapeutic activities  Safety Devices  Type of Devices: Call light within reach, Gait belt, Left in bed, Nurse notified  Restraints  Restraints Initially in Place: No     Restrictions  Restrictions/Precautions  Required Braces or Orthoses?: No  Required Braces or Orthoses  Spinal: Thoracic Lumbar Sacral Orthotics  Position Activity Restriction  Other position/activity restrictions: up with assist     Subjective   General  Patient assessed for rehabilitation

## 2024-02-01 NOTE — PROGRESS NOTES
Occupational Therapy  Facility/Department: [address] ORTHO/MED SURG  Occupational Therapy Initial Assessment    Name: Kaylee Purvis  : 1950  MRN: [id number]  Date of Service: 2024  Chief Complaint   Patient presents with    Fall     X 2 days, denies LOC, denies blood thinner    Back Pain    Facial Pain     nose       Discharge Recommendations:  Patient would benefit from continued therapy after discharge  OT Equipment Recommendations  Equipment Needed: Yes  Mobility Devices: Walker;ADL Assistive Devices  Walker: Rolling  ADL Assistive Devices: Shower Chair with back;Grab Bars - shower;Grab Bars - tub       Patient Diagnosis(es): The primary encounter diagnosis was Closed fracture of multiple ribs of both sides, initial encounter. Diagnoses of Murmur, cardiac and Compression fracture of T4 vertebra, initial encounter ([facility]) were also pertinent to this visit.  Past Medical History:  has a past medical history of Hypertension.  Past Surgical History:  has a past surgical history that includes Thoracic spine surgery (2024).           Assessment   Performance deficits / Impairments: Decreased functional mobility ;Decreased ADL status;Decreased endurance;Decreased balance;Decreased high-level IADLs;Decreased posture  Assessment: RN ok'd OT eval this date. Pt began session supine in bed with TLSO brace donned. Pt educated and demonstrated on log roll technique, requiring MOD A from writer for trunk progression. Pt sat unsupported EOB demo'd doff/trina B socks utilizing figure-4 method with MIN A from writer to thread B socks. Pt engaging in STS transfer with MIN A and functional mobility with CGA with RW. Pt ended session supine in bed. Pt would continue to benefit from OT services to address deficits listed above and improve overall functional performance prior to discharge.  Prognosis: Good  Decision Making: Medium Complexity  REQUIRES OT FOLLOW-UP: Yes  Activity Tolerance  Activity Tolerance: Patient

## 2024-02-01 NOTE — PROGRESS NOTES
POST OP NOTE    SUBJECTIVE  Pt s/p T2-6 posterior instrumentation fusion.     OBJECTIVE  VITALS:  /76   Pulse 68   Temp 98.8 °F (37.1 °C) (Oral)   Resp 15   Ht 1.626 m (5' 4\")   Wt 77.1 kg (170 lb)   SpO2 96%   BMI 29.18 kg/m²         GENERAL:  awake and alert.  No acute distress  CARDIOVASCULAR:  regular rate and rhythm   LUNGS:  CTA Bilaterally  ABDOMEN:   Abdomen soft, non-tender, non-distended  INCISION: Incision clean/dry/intact    ASSESSMENT  1. POD# 0 s/p T2-6 posterior instrumentation fusion    PLAN  1. Pain management-MMPT  2. GI proph-Zofran 4mg PRN, Glycolax 17g        Khurram Emanuel MD  Trauma/Surgery Service  2/1/2024 at 12:56 AM

## 2024-02-01 NOTE — CONSULTS
[facility] Inpatient Brief Diagnostic Assessment Note  LINDA Membreno   2/1/2024  11:13 AM  Kaylee Purvis  1950  [id number]      Time spent with Patient: 5 minutes     Pt was provided informed consent for the [facility]. Discussed with patient model of service to include the limits of confidentiality (i.e. abuse reporting, suicide intervention, etc.) and short-term intervention focused approach.  Pt indicated understanding.    This was not a virtual session      Presenting Patient Report:  Patient was screened at the request of the Trauma Team.   Patient presents as a 73 y.o. female subsequent to hospital admission following a fall resulting in injury.     Patient was able to complete the following screens: ITSS, PC-PTSD-5, and PHQ-4    Pt was resting and coping well.  Pt reports some worry and tearfulness but denies any significant or hx of anxiety/depression.  Pt reports some decrease in interest in doing things but that is due to pain.  Pt denied further bedside needs.        MSE:     Appearance:   Hospital Gown, Good Hygiene, and Good Eye Contact  Appetite normal  Sleep disturbance No  Fatigue No  Loss of pleasure Yes  Impulsive behavior No  Speech    normal rate, normal volume, and well articulated  Mood    Euthymic and Hopefull / Future Oriented  Affect    Appropriate to Context and Normal  Thought Content    intact  Thought Process    linear, goal directed, and coherent  Associations    logical connections  Insight    Good  Judgment    Intact  Orientation    oriented to person, place, time, and general circumstances  Memory    recent and remote memory intact  Attention/Concentration    intact  Morbid ideation No  Suicide Assessment    no suicidal ideation      Assessment:  Pt presented as calm and coping.  Pt having some tearfulness and worry but it is appropriate to context.  Pt has no mental health hx.  Pt did not screen for at risk for depression or PTSD.  Pt has no

## 2024-02-01 NOTE — CARE COORDINATION
SBIRT completed with pt.  Pt's brother present with pt permission. Ellioton arrived later and pt gave permission to cont talking with her.  Pt admitted after fall at home.  Pt reports she drinks alcohol 1x week, 3 rum/cokes.  She denies all drug use.  She denies any concerns with her alcohol use.  No prior tx.  Pt denies any recent feelings of depression/SI.            Alcohol Screening and Brief Intervention        Recent Labs     01/30/24  1520   ALC <10       Alcohol Pre-screening     (WOMEN ONLY) How many times in the past year have you had 4 or more drinks in a day?: 1 or more  TOTAL SCORE:: 4    Drug Pre-Screening - none        Drug Screening DAST       Mood Pre-Screening (PHQ-2)  During the past 2 weeks, have you been bothered by, feeling down, depressed or hopeless?  No        I have interviewed Kaylee BELLA Hyun, [phone] regarding  Her alcohol consumption/drug use and risk for excessive use. Screenings were negative.  Patient  N/A intervention at this time.   Deferred []    Completed on: 2/1/2024   LINDA TAY

## 2024-02-01 NOTE — CARE COORDINATION
Case Management Assessment  Initial Evaluation    Date/Time of Evaluation: 2/1/2024 4:24 PM  Assessment Completed by: Josselin Le RN    If patient is discharged prior to next notation, then this note serves as note for discharge by case management.    Patient Name: Kaylee Purvis                   YOB: 1950  Diagnosis: Murmur, cardiac [R01.1]  Burst fracture of fourth thoracic vertebra (HCC) [S22.041A]  Closed fracture of multiple ribs of both sides, initial encounter [S22.43XA]  Compression fracture of T4 vertebra, initial encounter ([facility]) [S22.040A]                   Date / Time: 1/30/2024  2:53 PM    Patient Admission Status: Inpatient   Readmission Risk (Low < 19, Mod (19-27), High > 27): Readmission Risk Score: 13    Current PCP: Jeremiah Manning MD  PCP verified by CM? (P) Yes (Jeremiah Manning MD)    Chart Reviewed: Yes      History Provided by: (P) Patient  Patient Orientation: (P) Alert and Oriented, Person, Place, Situation, Self    Patient Cognition: (P) Alert    Hospitalization in the last 30 days (Readmission):  No    If yes, Readmission Assessment in  Navigator will be completed.    Advance Directives:      Code Status: Full Code   Patient's Primary Decision Maker is: (P) Legal Next of Kin      Discharge Planning:    Patient lives with: (P) Alone Type of Home: (P) House  Primary Care Giver: (P) Self  Patient Support Systems include: (P) Family Members   Current Financial resources: (P) Medicare  Current community resources: (P) None  Current services prior to admission: (P) None            Current DME:  walker            Type of Home Care services:  (P) None    ADLS  Prior functional level: (P) Independent in ADLs/IADLs  Current functional level: (P) Assistance with the following:, Mobility, Bathing, Toileting    PT AM-PAC: 17 /24  OT AM-PAC: 19 /24    Family can provide assistance at DC: (P) Yes (she says she can stay with her brother, he lives a couple of houses away from  Representative Agree with the Discharge Plan? (P) Yes    Josselin Le RN  Case Management Department  Ph: [phone] Fax:

## 2024-02-01 NOTE — PROGRESS NOTES
Neurosurgery AARON/Resident    Daily Progress Note   Chief Complaint   Patient presents with    Fall     X 2 days, denies LOC, denies blood thinner    Back Pain    Facial Pain     nose     2/1/2024  8:03 AM    Chart reviewed.  No acute events overnight.  No new complaints. Reports post op pain control. Cornelius remains post op. Has not eating post op. Awaiting brace from OPC.     Vitals:    02/01/24 0330 02/01/24 0406 02/01/24 0515 02/01/24 0740   BP: (!) 140/62   (!) 144/69   Pulse: 68   74   Resp: 12 16 23   Temp: 99.5 °F (37.5 °C)   98.8 °F (37.1 °C)   TempSrc: Oral   Oral   SpO2: 97%   98%   Weight:   74.1 kg (163 lb 5.8 oz)    Height:             PE:   AOx3   Motor   L deltoid 5/5; R deltoid 5/5  L biceps 5/5; R biceps 5/5  L triceps 5/5; R triceps 5/5  L wrist extension 5/5; R wrist extension 5/5  L intrinsics 5/5; R intrinsics 5/5      L iliopsoas 5/5 , R iliopsoas 5/5  L quadriceps 5/5; R quadriceps 5/5  L Dorsiflexion 5/5; R dorsiflexion 5/5  L Plantarflexion 5/5; R plantarflexion 5/5  L EHL 5/5; R EHL 5/5    Sensation intact     Incision c/d/i      Lab Results   Component Value Date    WBC 7.9 02/01/2024    HGB 11.5 (L) 02/01/2024    HCT 32.1 (L) 02/01/2024     02/01/2024    CHOL 219 (H) 07/25/2022    TRIG 97 07/25/2022    HDL 58 07/25/2022    ALT 10 07/25/2022    AST 14 07/25/2022     (L) 02/01/2024    K 4.0 02/01/2024     02/01/2024    CREATININE 0.4 (L) 02/01/2024    BUN 6 (L) 02/01/2024    CO2 22 02/01/2024    TSH 0.84 01/30/2024    INR 1.0 01/30/2024    LABA1C 5.2 01/30/2024       A/P  73 y.o. female who presents with burst fracture T4  POD 1 s/p T2-T6 posterior instrumentation fusion        - OPC to fit patient with brace    - activity as tolerated with brace    - PT/OT eval and treat   - advance diet   - SCDs for dvt ppx   - encourage IS  - Neuro checks per floor protocol  - franklin funk for retention       Please contact neurosurgery with any changes in patients neurologic status.        Suellen Diop PA-C  2/1/24  8:03 AM

## 2024-02-01 NOTE — PROGRESS NOTES
Multilevel  endplate degenerative changes are seen, resulting in mild canal stenosis at  T6-T7, T7-T8 levels.  No significant foraminal narrowing is seen.  There is  fluid in the T3-T4, T4-T5 interspinous space and edema in the paraspinal  musculature.    SPINAL CORD: Thoracic spinal cord has normal signal intensity.  Retropulsion  of fracture fragment at T4 body results in indentation of the ventral surface  of the spinal cord with mild flattening.  No abnormal T2 signal is seen.  The  spinal cord terminates at lower border of L1.    Impression  1. Acute burst fracture of T4 vertebral body with 60% height loss and 4 mm  retropulsion of fracture fragment into the canal space. There is mild canal  stenosis with indentation of the ventral surface of the spinal cord with mild  flattening. No abnormal signal within the cord.  2. Fluid in the T3-T4, T4-T5 interspinous space and edema in the paraspinal  musculature suggesting ligamentous injury.      01/30/24    ECHO (TTE) COMPLETE (PRN CONTRAST/BUBBLE/STRAIN/3D) 01/31/2024 12:32 PM (Final)    Interpretation Summary    Left Ventricle: Normal left ventricular systolic function. EF 3D is 64%. Left ventricle size is normal. Mildly increased wall thickness. Normal wall motion. Normal diastolic function.    Aortic Valve: Trileaflet valve. Sclerosis.    Image quality is good.    Signed by: Yolande Dwyer MD on 1/31/2024 12:32 PM       Encounter Date: 01/30/24   EKG 12 Lead   Result Value    Ventricular Rate 78    Atrial Rate 78    P-R Interval 162    QRS Duration 84    Q-T Interval 392    QTc Calculation (Bazett) 446    P Axis 69    R Axis 25    T Axis 38    Narrative    Normal sinus rhythm  Normal ECG  When compared with ECG of 09-SEP-2005 14:17,  No significant change was found          Assessment        REVIEW OF SYSTEMS    []   UNABLE TO OBTAIN:     Constitutional:  []   Chills   []  Fatigue   []  Fevers   []  Malaise   []  Weight loss   [] Other:     Respiratory:   []   Cough    []  Shortness of breath    []  Chest pain    [] Other:     Cardiovascular:   []  Chest pain  []  Dyspnea    []  Exertional chest pressure/discomfort     [] Fatigue      []  Palpitations    []  Syncope   [] Other:     Gastrointestinal:   []  Abdominal pain   []  Constipation    []  Diarrhea    []   Dysphagia   []  Reflux             []  Vomiting   [] Other:     Genitourinary:  []  Dysuria     []  Frequency   []  Hematuria   [] Nocturia   []  Urinary incontinence   [] Other:     Musculoskeletal:   [] Back pain    []  Muscle weakness   []  Myalgias    []  Neck pain   []  Stiff joints   [x]  Other: Back brace in place     Behavioral/Psych:   [] Anxiety    []  Depression     []  Mood swings   [] Other:     PHYSICAL ASSESSMENT:     General: [x]  Oriented x3      [] well appearing      [] Intubated      [] ill appearing      [] Other:    Mental Status: [x] normal mental status exam      [] drowsy      [] Confused      [] Other:     Cardiovascular: [x]  Regular rate/rhythm      [] Arrhythmia      [] Other:     Chest: [x] Effort normal      [] lungs clear      [] respiratory distress      [] Tachypnea      []  Other:    Abdomen: [x] Soft/non-tender      []  Normal appearance      [] Distended      [] Ascites      [] Other:    Neurological: [x] Normal Speech      [] Normal Sensation      []  Deficits present:      Extremity:  [x] normal skin color/temp      [] clubbing/cyanosis      []  No edema      [] Other:     Palliative Performance Scale:  _x__60%  Ambulation reduced; Significant disease; Can't do hobbies/housework; intake normal or reduced; occasional assist; LOC full/confusion  ___50%  Mainly sit/lie; Extensive disease; Can't do any work; Considerable assist; intake normal or reduced; LOC full/confusion  ___40%  Mainly in bed; Extensive disease; Mainly assist; intake normal or reduced; LOC full/confusion   ___30%  Bed Bound; Extensive disease; Total care; intake reduced; LOCfull/confusion  ___20%  Bed Bound;

## 2024-02-01 NOTE — PROGRESS NOTES
PROGRESS NOTE      PATIENT NAME: Kaylee Purvis  MEDICAL RECORD NO. [id number]  DATE: 2/1/2024    HD: # 2      Patient Active Problem List   Diagnosis    Essential hypertension    Murmur, cardiac    Hyperglycemia    Adrenal nodule (HCC)    Chronic pain of right knee    Nodule of lower lobe of left lung    Burst fracture of fourth thoracic vertebra (HCC)    Multiple closed fractures of ribs of both sides    Compression fracture of T4 vertebra (HCC)    Preoperative cardiovascular examination    Goals of care, counseling/discussion    Encounter for palliative care       DIAGNOSIS AND PLAN    T4 burst compression fx, 60% loss of normal height              Neurosurgery consult                          MRI thoracic spine    S/p T2-T6 posterior instrumentation fusion    OP to fit patient with brace   WBAT              Neuro checks per protocol              PT/OT eval     Acute nondisplaced posterior fractures Right ribs 1-2 and Left rib 1              CTA neck w/ contrast negative for acute vascular injuries              Supplemental oxygen as needed, maintain SpO2 > 88%        MMPT        Encourage IS        PIC:9        Hx of unspecified cardiac murmur              Cardiology consult                          Surgical risk stratification - moderate                          TTE - EF 64%, mild aortic stenosis                           Continuous telemetry   F/u outpatient      Dispo pending PT/OT evaluation    Chief Complaint: \"I'm fine\"    SUBJECTIVE    Patient seen and examined at bedside. No acute events overnight, VSS, afebrile. Endorsing 7/10 thoracic pain. Shields in place, plan for void trial today.     OBJECTIVE  VITALS:   Vitals:    02/01/24 0740   BP: (!) 144/69   Pulse: 74   Resp: 23   Temp: 98.8 °F (37.1 °C)   SpO2: 98%     Physical Exam  Constitutional:       General: She is not in acute distress.     Appearance: Normal appearance. She is normal weight.   HENT:      Head: Normocephalic and atraumatic.

## 2024-02-01 NOTE — PROGRESS NOTES
[facility]   Progress Note                   Date:   2/1/2024  Patient name: Kaylee Purvis  Date of admission:  1/30/2024  2:53 PM  MRN:   [id number]  YOB: 1950  PCP: Jeremiah Manning MD    Reason for Admission: Murmur, cardiac [R01.1]  Burst fracture of fourth thoracic vertebra ([facility]) [S22.041A]  Closed fracture of multiple ribs of both sides, initial encounter [S22.43XA]  Compression fracture of T4 vertebra, initial encounter ([facility]) [S22.040A]    Subjective:       Clinical Changes / Abnormalities: Pt seen and examined in the room.  Pt denies any CP or sob.        Medications:   Scheduled Meds:   potassium chloride  10 mEq Oral BID    sodium chloride flush  5-40 mL IntraVENous 2 times per day    sodium chloride flush  5-40 mL IntraVENous 2 times per day    polyethylene glycol  17 g Oral Daily    sennosides-docusate sodium  1 tablet Oral BID    hydroCHLOROthiazide  12.5 mg Oral Daily    amLODIPine  10 mg Oral Daily    acetaminophen  1,000 mg Oral 3 times per day    gabapentin  300 mg Oral 3 times per day    methocarbamol  750 mg Oral Q6H     Continuous Infusions:   sodium chloride       CBC:   Recent Labs     01/30/24  1520 01/31/24  0518 01/31/24  1603 02/01/24  0418   WBC 9.1 8.4  --  7.9   HGB 13.9 12.5 11.7* 11.5*    224  --  218     BMP:    Recent Labs     01/30/24  1520 01/31/24  0518 01/31/24  1603 02/01/24  0418    136 138 133*   K 3.0* 3.5* 3.1* 4.0   CL 97* 103  --  101   CO2 21 27  --  22   BUN 11 8  --  6*   CREATININE 0.5 0.4*  --  0.4*   GLUCOSE 140* 112*  --  150*     Hepatic: No results for input(s): \"AST\", \"ALT\", \"ALB\", \"BILITOT\", \"ALKPHOS\" in the last 72 hours.  Troponin:   Recent Labs     01/30/24  1520 01/30/24  1640   TROPHS 15* 13     BNP: No results for input(s): \"BNP\" in the last 72 hours.  Lipids: No results for input(s): \"CHOL\", \"HDL\" in the last 72 hours.    Invalid input(s): \"LDLCALCU\"  INR:   Recent Labs     01/30/24  1520   INR 1.0      LAST ECHO:  Date: 11/2020  Findings Summary:  Left ventricle is normal in size Global left ventricular systolic function  is normal Estimated ejection fraction is 65-70% .  Normal right ventricular size and function.  Trivial mitral regurgitation.  Left atrium is moderately dilated.  Aortic leaflet calcification with mild stenosis.  Normal aortic root dimension.  No pericardial effusion seen  Objective:   Vitals: BP (!) 144/69   Pulse 74   Temp 98.8 °F (37.1 °C) (Oral)   Resp 20   Ht 1.626 m (5' 4\")   Wt 74.1 kg (163 lb 5.8 oz)   SpO2 98%   BMI 28.04 kg/m²   General appearance: alert and cooperative with exam  HEENT: Head: Normocephalic, no lesions, without obvious abnormality.  Neck: no JVD, trachea midline, no adenopathy  Lungs: Clear to auscultation, BRACE ON   Heart: Regular rate and rhythm, s1/s2 auscultated, no murmurs  Abdomen: soft, non-tender, bowel sounds active  Extremities: no edema  Neurologic: not done        Assessment / Acute Cardiac Problems:   T4 burst fx  Hypetension   Nodule of LLL  Adrenal nodule   Heart murmur   Surgical clearance for possible NSGY intervention     Patient Active Problem List:     Essential hypertension     Murmur, cardiac     Hyperglycemia     Adrenal nodule (HCC)     Chronic pain of right knee     Nodule of lower lobe of left lung     Burst fracture of fourth thoracic vertebra (HCC)     Multiple closed fractures of ribs of both sides     Compression fracture of T4 vertebra (HCC)     Preoperative cardiovascular examination     Goals of care, counseling/discussion     Encounter for palliative care      Plan of Treatment:   No CV issued during OR  Will sign off.  Please call with further questions or concerns     Electronically signed by TRACE CR CNP on 2/1/2024 at 11:17 AM  [facility] [employer]  [phone]

## 2024-02-01 NOTE — PROGRESS NOTES
Orthotists office contacted at [phone] to complete the order for [place] Hyperextension Brace.   A patient facesheet, copy of the order and special order form faxed to [phone] as requested.

## 2024-02-01 NOTE — ANESTHESIA POSTPROCEDURE EVALUATION
Department of Anesthesiology  Postprocedure Note    Patient: Kaylee Purvis  MRN: [id number]  YOB: 1950  Date of evaluation: 2/1/2024    Procedure Summary     Date: 01/31/24 Room / Location: [address]    Anesthesia Start: 1520 Anesthesia Stop: 1912    Procedure: T2-T6 POSTERIOR INSTRUMENTATION FUSION Diagnosis:       Burst fracture of fourth thoracic vertebra (HCC)      (Burst fracture of fourth thoracic vertebra (HCC) [S22.041A])    Surgeons: David Molina MD Responsible Provider: Partha Mckinney MD    Anesthesia Type: general ASA Status: 3          Anesthesia Type: No value filed.    Bekah Phase I: Bekah Score: 9    Bekah Phase II:      Anesthesia Post Evaluation    Patient location during evaluation: PACU  Patient participation: complete - patient participated  Level of consciousness: awake and alert  Airway patency: patent  Nausea & Vomiting: no vomiting and vomiting  Cardiovascular status: blood pressure returned to baseline  Respiratory status: acceptable  Hydration status: stable  Pain management: adequate        No notable events documented.

## 2024-02-01 NOTE — OP NOTE
Operative Note      Patient: Kaylee Purvis  YOB: 1950  MRN: [id number]    Date of Procedure: 1/31/2024    Pre-Op Diagnosis Codes:     * Burst fracture of fourth thoracic vertebra (HCC) [S22.041A]    Post-Op Diagnosis: Same       Procedure(s):  T2-T6 POSTERIOR INSTRUMENTATION FUSION  Open treatment of thoracic fracture  Posterior segmental instrumentation at T2-T6  Posterior onlay arthrodesis at T2-T6  Use of computer-aided stereotactic navigation in the spine    Surgeon(s):  David Molina MD    Assistant:   * No surgical staff found *    Anesthesia: General    Estimated Blood Loss (mL): 100    Complications: None    Specimens:   * No specimens in log *    Implants:  Implant Name Type Inv. Item Serial No.  Lot No. LRB No. Used Action   SCREW SPNL L30MM DIA4.5MM THORACOLUMBOSACRAL CO CHROM - [id number]  SCREW SPNL L30MM DIA4.5MM THORACOLUMBOSACRAL CO CHROM  [employer] SOFAMOR DANEK-WD  N/A 3 Implanted   SCREW SPNL L35MM DIA4.5MM CO CHROM TI MULTIAXIAL FOR 4.75MM - [id number]  SCREW SPNL L35MM DIA4.5MM CO CHROM TI MULTIAXIAL FOR 4.75MM  [employer] SOFAMOR DANEK-WD  N/A 2 Implanted   SCREW SPNL L40MM DIA4.5MM CO CHROM TI MULTIAXIAL FOR 4.75MM - [id number]  SCREW SPNL L40MM DIA4.5MM CO CHROM TI MULTIAXIAL FOR 4.75MM  [employer] SOFAMOR DANEK-WD  N/A 3 Implanted   MIRIAM SPNL L100MM DIA4.75MM CO CHROM PREBENT CDH SOLERA - [id number]  MIRIAM SPNL L100MM DIA4.75MM CO CHROM PREBENT CDH SOLERA  [employer] SOFAMOR DANEK-WD  N/A 2 Implanted   SET SCR SPNL DIA4.75MM TI BRK OFF CDH SOLERA - [id number]  SET SCR SPNL DIA4.75MM TI BRK OFF CDH SOLERA  [employer] SOFAMOR DANEK-WD  N/A 8 Implanted   GRAFT BNE SUB 10CC SACROILIAC PTTY DEMIN MTRX JR FOR RIALTO - [id number]  GRAFT BNE SUB 10CC SACROILIAC PTTY DEMIN MTRX JR FOR RIALTO [id number] [employer] SPINALGRAFT TECH-  N/A 1 Implanted   GRAFT BNE SUB [id number] POSTEROLATERAL CERV DEMIN BNE MTRX - [id number]  GRAFT BNE SUB [id number] POSTEROLATERAL CERV DEMIN BNE MTRX  decorticated with the electric drill and Magnifuse cancellous bone chips were laid down along the posterior elements and packed into place with Bath DBM.    At this point attention was turned to closing the wound.  The wound was closed in layers.  The fascia was reapproximated with interrupted 0 Vicryl suture.  The subcutaneous fat was closed with inverted, interrupted 2-0 Vicryl suture.  The dermis was closed with inverted, interrupted 3-0 Vicryl suture.  The skin was closed with a skin stapler.  The wound was cleaned and dried and dressed with Xeroform gauze and an island dressing.  The patient was returned to a supine position and extubated in the operating room without incident.  All sponge, instrument, and needle counts were correct at the conclusion of the case.  The patient was taken to recovery in stable condition without evidence of complication.    Electronically signed by David Molina MD on 1/31/2024 at 7:19 PM

## 2024-02-02 VITALS
HEART RATE: 80 BPM | OXYGEN SATURATION: 97 % | SYSTOLIC BLOOD PRESSURE: 121 MMHG | WEIGHT: 163.36 LBS | DIASTOLIC BLOOD PRESSURE: 48 MMHG | BODY MASS INDEX: 27.89 KG/M2 | TEMPERATURE: 97.4 F | HEIGHT: 64 IN | RESPIRATION RATE: 16 BRPM

## 2024-02-02 LAB
ANION GAP SERPL CALCULATED.3IONS-SCNC: 9 MMOL/L (ref 9–17)
BASOPHILS # BLD: <0.03 K/UL (ref 0–0.2)
BASOPHILS NFR BLD: 0 % (ref 0–2)
BUN SERPL-MCNC: 11 MG/DL (ref 8–23)
CALCIUM SERPL-MCNC: 8.7 MG/DL (ref 8.6–10.4)
CHLORIDE SERPL-SCNC: 99 MMOL/L (ref 98–107)
CO2 SERPL-SCNC: 24 MMOL/L (ref 20–31)
CREAT SERPL-MCNC: 0.5 MG/DL (ref 0.5–0.9)
EOSINOPHIL # BLD: 0.07 K/UL (ref 0–0.44)
EOSINOPHILS RELATIVE PERCENT: 1 % (ref 1–4)
ERYTHROCYTE [DISTWIDTH] IN BLOOD BY AUTOMATED COUNT: 13.2 % (ref 11.8–14.4)
GFR SERPL CREATININE-BSD FRML MDRD: >60 ML/MIN/1.73M2
GLUCOSE SERPL-MCNC: 107 MG/DL (ref 70–99)
HCT VFR BLD AUTO: 32.8 % (ref 36.3–47.1)
HGB BLD-MCNC: 11.5 G/DL (ref 11.9–15.1)
IMM GRANULOCYTES # BLD AUTO: 0.05 K/UL (ref 0–0.3)
IMM GRANULOCYTES NFR BLD: 1 %
LYMPHOCYTES NFR BLD: 1.56 K/UL (ref 1.1–3.7)
LYMPHOCYTES RELATIVE PERCENT: 18 % (ref 24–43)
MAGNESIUM SERPL-MCNC: 2.3 MG/DL (ref 1.6–2.6)
MCH RBC QN AUTO: 31.9 PG (ref 25.2–33.5)
MCHC RBC AUTO-ENTMCNC: 35.1 G/DL (ref 28.4–34.8)
MCV RBC AUTO: 90.9 FL (ref 82.6–102.9)
MONOCYTES NFR BLD: 0.59 K/UL (ref 0.1–1.2)
MONOCYTES NFR BLD: 7 % (ref 3–12)
NEUTROPHILS NFR BLD: 73 % (ref 36–65)
NEUTS SEG NFR BLD: 6.19 K/UL (ref 1.5–8.1)
NRBC BLD-RTO: 0 PER 100 WBC
PHOSPHATE SERPL-MCNC: 3.1 MG/DL (ref 2.6–4.5)
PLATELET # BLD AUTO: 258 K/UL (ref 138–453)
PMV BLD AUTO: 10.7 FL (ref 8.1–13.5)
POTASSIUM SERPL-SCNC: 3.3 MMOL/L (ref 3.7–5.3)
RBC # BLD AUTO: 3.61 M/UL (ref 3.95–5.11)
SODIUM SERPL-SCNC: 132 MMOL/L (ref 135–144)
WBC OTHER # BLD: 8.5 K/UL (ref 3.5–11.3)

## 2024-02-02 PROCEDURE — 6370000000 HC RX 637 (ALT 250 FOR IP)

## 2024-02-02 PROCEDURE — 85025 COMPLETE CBC W/AUTO DIFF WBC: CPT

## 2024-02-02 PROCEDURE — 97110 THERAPEUTIC EXERCISES: CPT

## 2024-02-02 PROCEDURE — APPSS30 APP SPLIT SHARED TIME 16-30 MINUTES: Performed by: PHYSICIAN ASSISTANT

## 2024-02-02 PROCEDURE — 83735 ASSAY OF MAGNESIUM: CPT

## 2024-02-02 PROCEDURE — 6370000000 HC RX 637 (ALT 250 FOR IP): Performed by: STUDENT IN AN ORGANIZED HEALTH CARE EDUCATION/TRAINING PROGRAM

## 2024-02-02 PROCEDURE — 6370000000 HC RX 637 (ALT 250 FOR IP): Performed by: NEUROLOGICAL SURGERY

## 2024-02-02 PROCEDURE — 2580000003 HC RX 258: Performed by: NEUROLOGICAL SURGERY

## 2024-02-02 PROCEDURE — 97116 GAIT TRAINING THERAPY: CPT

## 2024-02-02 PROCEDURE — 80048 BASIC METABOLIC PNL TOTAL CA: CPT

## 2024-02-02 PROCEDURE — 2580000003 HC RX 258: Performed by: ANESTHESIOLOGY

## 2024-02-02 PROCEDURE — 84100 ASSAY OF PHOSPHORUS: CPT

## 2024-02-02 PROCEDURE — 99238 HOSP IP/OBS DSCHRG MGMT 30/<: CPT | Performed by: NURSE PRACTITIONER

## 2024-02-02 PROCEDURE — 36415 COLL VENOUS BLD VENIPUNCTURE: CPT

## 2024-02-02 RX ORDER — GABAPENTIN 300 MG/1
300 CAPSULE ORAL EVERY 8 HOURS SCHEDULED
Qty: 21 CAPSULE | Refills: 0 | Status: SHIPPED | OUTPATIENT
Start: 2024-02-02 | End: 2024-02-09

## 2024-02-02 RX ORDER — POTASSIUM CHLORIDE 20 MEQ/1
40 TABLET, EXTENDED RELEASE ORAL 2 TIMES DAILY
Status: DISCONTINUED | OUTPATIENT
Start: 2024-02-02 | End: 2024-02-02 | Stop reason: HOSPADM

## 2024-02-02 RX ORDER — OXYCODONE HYDROCHLORIDE 5 MG/1
2.5 TABLET ORAL EVERY 6 HOURS PRN
Qty: 10 TABLET | Refills: 0 | Status: SHIPPED | OUTPATIENT
Start: 2024-02-02 | End: 2024-02-09

## 2024-02-02 RX ORDER — SENNA AND DOCUSATE SODIUM 50; 8.6 MG/1; MG/1
1 TABLET, FILM COATED ORAL 2 TIMES DAILY
Qty: 60 TABLET | Refills: 0 | Status: SHIPPED | OUTPATIENT
Start: 2024-02-02

## 2024-02-02 RX ORDER — METHOCARBAMOL 750 MG/1
750 TABLET, FILM COATED ORAL EVERY 6 HOURS
Qty: 40 TABLET | Refills: 0 | Status: SHIPPED | OUTPATIENT
Start: 2024-02-02 | End: 2024-02-12

## 2024-02-02 RX ADMIN — GABAPENTIN 300 MG: 300 CAPSULE ORAL at 13:07

## 2024-02-02 RX ADMIN — DOCUSATE SODIUM 50 MG AND SENNOSIDES 8.6 MG 1 TABLET: 8.6; 5 TABLET, FILM COATED ORAL at 08:57

## 2024-02-02 RX ADMIN — SODIUM CHLORIDE, PRESERVATIVE FREE 10 ML: 5 INJECTION INTRAVENOUS at 08:58

## 2024-02-02 RX ADMIN — ACETAMINOPHEN 1000 MG: 500 TABLET ORAL at 13:07

## 2024-02-02 RX ADMIN — ACETAMINOPHEN 1000 MG: 500 TABLET ORAL at 05:51

## 2024-02-02 RX ADMIN — OXYCODONE 5 MG: 5 TABLET ORAL at 00:29

## 2024-02-02 RX ADMIN — POTASSIUM CHLORIDE 40 MEQ: 1500 TABLET, EXTENDED RELEASE ORAL at 08:56

## 2024-02-02 RX ADMIN — HYDROCHLOROTHIAZIDE 12.5 MG: 25 TABLET ORAL at 09:17

## 2024-02-02 RX ADMIN — METHOCARBAMOL 750 MG: 750 TABLET ORAL at 05:51

## 2024-02-02 RX ADMIN — AMLODIPINE BESYLATE 10 MG: 10 TABLET ORAL at 08:57

## 2024-02-02 RX ADMIN — METHOCARBAMOL 750 MG: 750 TABLET ORAL at 00:29

## 2024-02-02 RX ADMIN — OXYCODONE 5 MG: 5 TABLET ORAL at 08:55

## 2024-02-02 RX ADMIN — METHOCARBAMOL 750 MG: 750 TABLET ORAL at 13:07

## 2024-02-02 RX ADMIN — GABAPENTIN 300 MG: 300 CAPSULE ORAL at 05:51

## 2024-02-02 RX ADMIN — POLYETHYLENE GLYCOL 3350 17 G: 17 POWDER, FOR SOLUTION ORAL at 08:56

## 2024-02-02 ASSESSMENT — PAIN DESCRIPTION - DESCRIPTORS
DESCRIPTORS: TIGHTNESS
DESCRIPTORS: ACHING

## 2024-02-02 ASSESSMENT — PAIN SCALES - GENERAL
PAINLEVEL_OUTOF10: 7
PAINLEVEL_OUTOF10: 6
PAINLEVEL_OUTOF10: 7
PAINLEVEL_OUTOF10: 7

## 2024-02-02 ASSESSMENT — PAIN DESCRIPTION - LOCATION
LOCATION: BACK

## 2024-02-02 ASSESSMENT — PAIN DESCRIPTION - ORIENTATION
ORIENTATION: MID
ORIENTATION: MID
ORIENTATION: MID;PROXIMAL

## 2024-02-02 ASSESSMENT — PAIN DESCRIPTION - PAIN TYPE: TYPE: SURGICAL PAIN

## 2024-02-02 NOTE — DISCHARGE SUMMARY
DISCHARGE SUMMARY:    PATIENT NAME:  Kaylee Purvis  YOB: 1950  MEDICAL RECORD NO. [id number]  DATE: 02/02/24  PRIMARY CARE PHYSICIAN: Jeremiah Manning MD  ADMIT DATE:  1/30/2024    DISCHARGE DATE:  2/2/2024  DISPOSITION:  Home with home care  ADMITTING DIAGNOSIS:   Fall    DIAGNOSIS:   Patient Active Problem List   Diagnosis    Essential hypertension    Murmur, cardiac    Hyperglycemia    Adrenal nodule (HCC)    Chronic pain of right knee    Nodule of lower lobe of left lung    Burst fracture of fourth thoracic vertebra (HCC)    Multiple closed fractures of ribs of both sides    Compression fracture of T4 vertebra (HCC)    Preoperative cardiovascular examination    Goals of care, counseling/discussion    Encounter for palliative care       CONSULTANTS:  NS, Cardio    PROCEDURES:   1/31: OR-T2-T6 posterior instrumentation fusion. Open treatment of thoracic fracture. Posterior segmental instrumentation at T2-T6    HOSPITAL COURSE:   Kaylee Purvis is a 73 y.o. female who was admitted on 1/30/2024  Hospital Course:  TRAUMA  CC: Fall 2 days prior missed bottom step and fell forward into wall, persistent back pain prompted ED visit, has hx heart murmur, 70 pack year smoker, -AC, -LOC, hx HTN, OA R knee w/ falls from pain    Injuries: T4 burst w/ 60% height loss, B/L 1st rib fx, R 2nd rib fx (Rib score 6)    Hospital Course:  1/30: CTA neck-no arterial injury.  1/31: TTE echo 64% EF, mild aortic stenosis. OR with NS.  2/1: worked with therapies.    Labs and imaging were followed.     On day of discharge Kaylee Purvis  was tolerating a regular diet  She was deemed medically stable for discharge to Home with Home Health Care        PHYSICAL EXAMINATION:        Discharge Vitals:  height is 1.626 m (5' 4\") and weight is 74.1 kg (163 lb 5.8 oz). Her oral temperature is 97.4 °F (36.3 °C). Her blood pressure is 121/48 (abnormal) and her pulse is 80. Her respiration is 16 and oxygen saturation is 97%.

## 2024-02-02 NOTE — PROGRESS NOTES
Kaylee Purvis was evaluated today and a DME order was entered for a shower/bath seat with back  because the patient requires this to successfully complete daily living tasks of bathing, grooming and hygiene.  A shower/bath seat with back is necessary due to the patient's unsteady gait, inability to stand unassisted in the shower/bath.The need for this equipment was discussed with the patient. They understand and are in agreement.

## 2024-02-02 NOTE — PLAN OF CARE
Problem: Discharge Planning  Goal: Discharge to home or other facility with appropriate resources  Outcome: Adequate for Discharge  Flowsheets (Taken 2/2/2024 0900)  Discharge to home or other facility with appropriate resources:   Identify barriers to discharge with patient and caregiver   Arrange for needed discharge resources and transportation as appropriate   Identify discharge learning needs (meds, wound care, etc)   Refer to discharge planning if patient needs post-hospital services based on physician order or complex needs related to functional status, cognitive ability or social support system     Problem: Pain  Goal: Verbalizes/displays adequate comfort level or baseline comfort level  Outcome: Adequate for Discharge     Problem: Safety - Adult  Goal: Free from fall injury  Outcome: Adequate for Discharge     Problem: Skin/Tissue Integrity  Goal: Absence of new skin breakdown  Description: 1.  Monitor for areas of redness and/or skin breakdown  2.  Assess vascular access sites hourly  3.  Every 4-6 hours minimum:  Change oxygen saturation probe site  4.  Every 4-6 hours:  If on nasal continuous positive airway pressure, respiratory therapy assess nares and determine need for appliance change or resting period.  Outcome: Adequate for Discharge     Problem: ABCDS Injury Assessment  Goal: Absence of physical injury  Outcome: Adequate for Discharge

## 2024-02-02 NOTE — DISCHARGE INSTR - COC
Continuity of Care Form    Patient Name: Kaylee Purvis   :  1950  MRN:  [id number]    Admit date:  2024  Discharge date:  ***    Code Status Order: Full Code   Advance Directives:   Advance Care Flowsheet Documentation       Date/Time Healthcare Directive Type of Healthcare Directive Copy in Chart Healthcare Agent Appointed Healthcare Agent's Name Healthcare Agent's Phone Number    24 1405 No, patient does not have an advance directive for healthcare treatment -- -- -- -- --            Admitting Physician:  Jesse Herr DO  PCP: Jeremiah Manning MD    Discharging Nurse: ***  Discharging Hospital Unit/Room#: 0240/0240-01  Discharging Unit Phone Number: ***    Emergency Contact:   Extended Emergency Contact Information  Primary Emergency Contact: Donna Mandujano  Home Phone: [phone]  Relation: Other  Secondary Emergency Contact: HyunAdelfo  Home Phone: [phone]  Mobile Phone: [phone]  Relation: Child  Preferred language: English   needed? No    Past Surgical History:  Past Surgical History:   Procedure Laterality Date    LUMBAR FUSION N/A 2024    T2-T6 POSTERIOR INSTRUMENTATION FUSION performed by David Molina MD at [facility] OR    THORACIC SPINE SURGERY  2024    T2-T6 POSTERIOR INSTRUMENTATION FUSION POSSIBLE DECOMPRESSION**C-ARM, O-ARM, SHASHI SPINE, [employer] NOTIFIED PER [place]**       Immunization History:   Immunization History   Administered Date(s) Administered    COVID-19, MODERNA BLUE border, Primary or Immunocompromised, (age 12y+), IM, 100 mcg/0.5mL 2021, 2021       Active Problems:  Patient Active Problem List   Diagnosis Code    Essential hypertension I10    Murmur, cardiac R01.1    Hyperglycemia R73.9    Adrenal nodule (HCC) E27.8    Chronic pain of right knee M25.561, G89.29    Nodule of lower lobe of left lung R91.1    Burst fracture of fourth thoracic vertebra (HCC) S22.041A    Multiple closed fractures of ribs of both sides  {[facility]:[id number]}

## 2024-02-02 NOTE — PROGRESS NOTES
CLINICAL PHARMACY NOTE: MEDS TO BEDS    Total # of Prescriptions Filled: 4   The following medications were delivered to the patient:  GABAPENTIN 300MG  IXY 5MG   SENEXON   METHOCARBAMOL 750MG     Additional Documentation:

## 2024-02-02 NOTE — PROGRESS NOTES
Physical Therapy  Facility/Department: [address] ORTHO/MED SURG  Physical Therapy Daily Treatment Note    Name: Kaylee Purvis  : 1950  MRN: [id number]  Date of Service: 2024    Discharge Recommendations:  Patient would benefit from continued therapy after discharge   PT Equipment Recommendations  Equipment Needed: Yes  Mobility Devices: Walker  Walker: Rolling      Patient Diagnosis(es): The primary encounter diagnosis was Closed fracture of multiple ribs of both sides, initial encounter. Diagnoses of Murmur, cardiac and Compression fracture of T4 vertebra, initial encounter ([facility]) were also pertinent to this visit.  Past Medical History:  has a past medical history of Hypertension.  Past Surgical History:  has a past surgical history that includes Thoracic spine surgery (2024) and lumbar fusion (N/A, 2024).    Assessment   Body Structures, Functions, Activity Limitations Requiring Skilled Therapeutic Intervention: Decreased functional mobility ;Decreased strength;Decreased endurance  Assessment: Pt ambulated 250ft with RW and CGA, pt overall slow and steady with no true LOB. Pt most limited by increased pain an decreased endurance. Recommending continued PT to address deficits and maximize safety and independence with mobility. She should be safe to return to prior living arrangements with support from family.  Therapy Prognosis: Good  Requires PT Follow-Up: Yes  Activity Tolerance  Activity Tolerance: Patient limited by pain;Patient tolerated treatment well     Plan   Physical Therapy Plan  General Plan: 6-7 times per week  Current Treatment Recommendations: Strengthening, Balance training, Functional mobility training, Transfer training, Gait training, Endurance training, Stair training, Safety education & training, Therapeutic activities  Safety Devices  Type of Devices: Call light within reach, Gait belt, Nurse notified, Chair alarm in place, Left in chair  Restraints  Restraints Initially  in Place: No     Restrictions  Restrictions/Precautions  Required Braces or Orthoses?: No  Required Braces or Orthoses  Spinal: Thoracic Lumbar Sacral Orthotics  Position Activity Restriction  Other position/activity restrictions: up with assist     Subjective   General  Patient assessed for rehabilitation services?: Yes  Response To Previous Treatment: Patient with no complaints from previous session.  Family / Caregiver Present: No  Follows Commands: Within Functional Limits  General Comment  Comments: Pt retired to seated in chair at end of session with call light in reach.  Subjective  Subjective: Pt and RN agreeable to PT this morning. Pt supine in bed upon arrival with TLSO brace donned. Pt pleasant and cooperative throughout session. Pt with c/o 7/10 pain in back.       Cognition   Orientation  Overall Orientation Status: Within Functional Limits  Orientation Level: Oriented X4  Cognition  Overall Cognitive Status: WFL     Objective   Bed mobility  Rolling to Left: Contact guard assistance  Supine to Sit: Contact guard assistance  Sit to Supine: Unable to assess (Pt retired to seated in chair at end of session.)  Scooting: Contact guard assistance  Bed Mobility Comments: HOB slightly elevated, increased time/effort required, log roll technique performed with TLSO brace donned.  Transfers  Sit to Stand: Stand by assistance  Stand to Sit: Stand by assistance  Comment: Transfers performed with use of RW, performed x1 from EOB, good hand placement noted throughout.  Ambulation  Surface: Level tile  Device: Rolling Walker  Assistance: Contact guard assistance (for safety)  Quality of Gait: slow guarded gait  Gait Deviations: Slow Dina;Decreased step height;Decreased step length  Distance: 250ft  Comments: Pt ambulated slow and overall steady with no true LOB.  More Ambulation?: No  Stairs/Curb  Stairs?: No     Balance  Posture: Good  Sitting - Static: Good  Sitting - Dynamic: Good  Standing - Static:

## 2024-02-02 NOTE — PROGRESS NOTES
PROGRESS NOTE      PATIENT NAME: Kaylee Purvis  MEDICAL RECORD NO. [id number]  DATE: 2/2/2024    HD: # 3      Patient Active Problem List   Diagnosis    Essential hypertension    Murmur, cardiac    Hyperglycemia    Adrenal nodule (HCC)    Chronic pain of right knee    Nodule of lower lobe of left lung    Burst fracture of fourth thoracic vertebra (HCC)    Multiple closed fractures of ribs of both sides    Compression fracture of T4 vertebra (HCC)    Preoperative cardiovascular examination    Goals of care, counseling/discussion    Encounter for palliative care       DIAGNOSIS AND PLAN    T4 burst compression fx, 60% loss of normal height              Neurosurgery consult                          MRI thoracic spine    S/p T2-T6 posterior instrumentation fusion    Has brace   WBAT              Neuro checks per protocol              PT/OT eval     Acute nondisplaced posterior fractures Right ribs 1-2 and Left rib 1              CTA neck w/ contrast negative for acute vascular injuries              Supplemental oxygen as needed, maintain SpO2 > 88%        MMPT        Encourage IS        PIC:9        Hx of unspecified cardiac murmur              Cardiology consult                          Surgical risk stratification - moderate                          TTE - EF 64%, mild aortic stenosis                           Continuous telemetry   F/u outpatient      Dispo   Stable for discharge home with her brother.    Chief Complaint: \"I'm fine\"    SUBJECTIVE    Patient seen and examined at bedside. No acute events overnight.  States she is doing well, pain is controlled, taking in some p.o. without nausea or emesis.  Voiding.    OBJECTIVE  VITALS:   Vitals:    02/02/24 0855   BP: (!) 121/48   Pulse:    Resp:    Temp:    SpO2:      Physical Exam  Constitutional:       General: She is not in acute distress.     Appearance: Normal appearance. She is normal weight.   HENT:      Head: Normocephalic and atraumatic.       Mouth/Throat:      Mouth: Mucous membranes are moist.      Pharynx: Oropharynx is clear.   Eyes:      General: No scleral icterus.     Extraocular Movements: Extraocular movements intact.   Cardiovascular:      Rate and Rhythm: Normal rate and regular rhythm.   Pulmonary:      Effort: Pulmonary effort is normal. No respiratory distress.      Breath sounds: No stridor.   Abdominal:      General: Abdomen is flat. There is no distension.      Palpations: Abdomen is soft.   Musculoskeletal:         General: Tenderness (upper thoracic spine) present. Surgical dressing, CDI.      Cervical back: Normal range of motion. No tenderness.   Skin:     General: Skin is warm and dry.      Capillary Refill: Capillary refill takes less than 2 seconds.   Neurological:      General: No focal deficit present.      Mental Status: She is alert and oriented to person, place, and time.     LAB:  CBC:   Recent Labs     01/31/24 0518 01/31/24  1603 02/01/24 0418 02/02/24  0616   WBC 8.4  --  7.9 8.5   HGB 12.5 11.7* 11.5* 11.5*   HCT 35.6* 36.1* 32.1* 32.8*   MCV 90.4  --  87.5 90.9     --  218 258       BMP:   Recent Labs     01/31/24 0518 01/31/24  1603 02/01/24  0418 02/02/24  0616    138 133* 132*   K 3.5* 3.1* 4.0 3.3*     --  101 99   CO2 27  --  22 24   BUN 8  --  6* 11   CREATININE 0.4*  --  0.4* 0.5   GLUCOSE 112*  --  150* 107*

## 2024-02-02 NOTE — PLAN OF CARE
Problem: Pain  Goal: Verbalizes/displays adequate comfort level or baseline comfort level  Outcome: Progressing     Problem: Discharge Planning  Goal: Discharge to home or other facility with appropriate resources  Outcome: Progressing     Problem: Safety - Adult  Goal: Free from fall injury  Outcome: Progressing     Problem: Skin/Tissue Integrity  Goal: Absence of new skin breakdown  Description: 1.  Monitor for areas of redness and/or skin breakdown  2.  Assess vascular access sites hourly  3.  Every 4-6 hours minimum:  Change oxygen saturation probe site  4.  Every 4-6 hours:  If on nasal continuous positive airway pressure, respiratory therapy assess nares and determine need for appliance change or resting period.  Outcome: Progressing     Problem: ABCDS Injury Assessment  Goal: Absence of physical injury  Outcome: Progressing

## 2024-02-02 NOTE — PROGRESS NOTES
Neurosurgery AARON/Resident    Daily Progress Note   Chief Complaint   Patient presents with    Fall     X 2 days, denies LOC, denies blood thinner    Back Pain    Facial Pain     nose     2/2/2024  8:59 AM    Chart reviewed.  No acute events overnight.  No new complaints. Ambulating with assistance and working with therapy.     Vitals:    02/01/24 2100 02/02/24 0059 02/02/24 0717 02/02/24 0855   BP: (!) 126/58  (!) 121/47 (!) 121/48   Pulse: 64  74    Resp: 16 18 16    Temp: 98.6 °F (37 °C)  97.4 °F (36.3 °C)    TempSrc: Oral  Oral    SpO2: 97%  97%    Weight:       Height:             PE:   AOx3   Motor   L deltoid 5/5; R deltoid 5/5  L biceps 5/5; R biceps 5/5  L triceps 5/5; R triceps 5/5  L wrist extension 5/5; R wrist extension 5/5  L intrinsics 5/5; R intrinsics 5/5      L iliopsoas 5/5 , R iliopsoas 5/5  L quadriceps 5/5; R quadriceps 5/5  L Dorsiflexion 5/5; R dorsiflexion 5/5  L Plantarflexion 5/5; R plantarflexion 5/5  L EHL 5/5; R EHL 5/5    Sensation intact     Incision c/d/i      Lab Results   Component Value Date    WBC 8.5 02/02/2024    HGB 11.5 (L) 02/02/2024    HCT 32.8 (L) 02/02/2024     02/02/2024    CHOL 219 (H) 07/25/2022    TRIG 97 07/25/2022    HDL 58 07/25/2022    ALT 10 07/25/2022    AST 14 07/25/2022     (L) 02/02/2024    K 3.3 (L) 02/02/2024    CL 99 02/02/2024    CREATININE 0.5 02/02/2024    BUN 11 02/02/2024    CO2 24 02/02/2024    TSH 0.84 01/30/2024    INR 1.0 01/30/2024    LABA1C 5.2 01/30/2024       A/P  73 y.o. female who presents with burst fracture T4  POD 2 s/p T2-T6 posterior instrumentation fusion                    - activity as tolerated with brace               - PT/OT eval and treat   - SCDs and lovenox for dvt ppx   - encourage IS  - Neuro checks per floor protocol   - NSG to sign off at this time, stable for dc home, will follow up outpatient in 2 weeks       Please contact neurosurgery with any changes in patients neurologic status.       Suellen Diop,

## 2024-02-05 NOTE — PROGRESS NOTES
[facility] ([facility]) Inpatient Discharge Summary  Suellen LINDA Le  2/5/2024        Kaylee BELLA Hyun  1950  [id number]    Date & Time of Discharge: 2/2/2024  4:42 PM       Services provided:  Screenings: ITSS, PC-PTSD-5, and PHQ-4    Screen Results (If any):      ITSS:  Date Screen Completed: 2/1/24  Patient's score= 1 for PTSD risk. ( ? 2 is positive for PTSD risk. )  Patient's score= 1 for depression risk.  ( ? 2 is positive for Depression risk )    PC-PTSD-5:  Date Screen Completed:  2/1/24  Patient's Score =  1  ( > 3 is positive for PTSD )    PHQ-4:   Date Screen Completed:  2/1/24  Patient's Score (Questions 1&2):  0  ? 3 suggests anxiety  Patient's Score (Questions 3&4):  3  ? 3 suggests depression    Discharge Recommendations:  No outpatient mental health services recommended      The therapist may be reached through the [facility] offices at [phone].

## 2024-02-06 ENCOUNTER — TELEPHONE (OUTPATIENT)
Age: 74
End: 2024-02-06

## 2024-02-06 DIAGNOSIS — G89.18 ACUTE POST-OPERATIVE PAIN: Primary | ICD-10-CM

## 2024-02-06 RX ORDER — OXYCODONE HYDROCHLORIDE AND ACETAMINOPHEN 5; 325 MG/1; MG/1
1 TABLET ORAL EVERY 6 HOURS PRN
Qty: 28 TABLET | Refills: 0 | Status: SHIPPED | OUTPATIENT
Start: 2024-02-06 | End: 2024-02-13

## 2024-02-06 NOTE — TELEPHONE ENCOUNTER
Received phone call from patient stating that she is having increased pain, worse than prior to surgery. Patient states pain in upper back, shoulders and neck. Patient states she is out of pain meds. Denies loss of control of bowels or bladder.  [facility] trauma 1/31/24-fall-acute burst compression fx at T4.  T2-T6 posterior instrumentation fusion on 1/31/24.  OARRS checked and shows oxycodone 5mg #10 x7days filled on 2/2/24. Due out 2/9/24.  Requests [facility] as preferred pharm.  Patient has PO f/u appt scheduled for 2/13/24.  Dr. Molina please advise, TY.

## 2024-02-13 ENCOUNTER — OFFICE VISIT (OUTPATIENT)
Age: 74
End: 2024-02-13

## 2024-02-13 VITALS — SYSTOLIC BLOOD PRESSURE: 133 MMHG | HEART RATE: 90 BPM | TEMPERATURE: 97.6 F | DIASTOLIC BLOOD PRESSURE: 56 MMHG

## 2024-02-13 DIAGNOSIS — S22.040D COMPRESSION FRACTURE OF T4 VERTEBRA WITH ROUTINE HEALING, SUBSEQUENT ENCOUNTER: Primary | ICD-10-CM

## 2024-02-13 PROCEDURE — 99024 POSTOP FOLLOW-UP VISIT: CPT | Performed by: NEUROLOGICAL SURGERY

## 2024-02-13 RX ORDER — TIZANIDINE 2 MG/1
2 TABLET ORAL 3 TIMES DAILY PRN
Qty: 30 TABLET | Refills: 2 | Status: SHIPPED | OUTPATIENT
Start: 2024-02-13 | End: 2024-02-13

## 2024-02-13 RX ORDER — TIZANIDINE 2 MG/1
2 TABLET ORAL 3 TIMES DAILY PRN
Qty: 30 TABLET | Refills: 2 | Status: SHIPPED | OUTPATIENT
Start: 2024-02-13

## 2024-02-14 NOTE — PROGRESS NOTES
[facility], [facility], [facility] NEUROSURGERY  [address], [address]  [place] [id number]  Dept: [phone]  Dept Fax: [phone]     Patient:  Kaylee Purvis  YOB: 1950  Date: 2/13/24      Chief Complaint   Patient presents with    Post-Op Check     T2-6 posterior instrumentation fusion on 1/31  Still very sore and stiff           HPI:     Ms. Purvis presents the office today for follow-up after she was initially seen in consultation at [place] in late January.  She suffered a T4 burst fracture as a result of a fall.  She underwent posterior instrumented stabilization and fusion from T2-T6 on January 31.  I performed that surgery for her.  She has continued to have quite a bit of difficulty with pain in her back since being discharged from the hospital.  She is staying with her brother and sister-in-law so that she can have some help.  She feels that she tires out easily when trying to be up and around.  She has not had any difficulty with the incision.      Physical Exam:      BP (!) 133/56 (Site: Left Upper Arm, Position: Sitting)   Pulse 90   Temp 97.6 °F (36.4 °C)     She is awake, alert, and in no acute distress.  She answers questions appropriately with clear and fluent speech.  Her cranial nerves are grossly intact.  She is moving her extremities well with full strength.  The incision is healing well without erythema or induration.  I remove the staples at today's visit.    Assessment and Plan:     1. Compression fracture of T4 vertebra with routine healing, subsequent encounter        Ms. Purvis is doing fairly well after undergoing a T2-6 posterior instrumented fusion for a T4 burst fracture.  She continues to have good strength in her extremities.  She denies new symptoms in the lower extremities.  It is not unusual to have significant pain around the incision after the surgery because of the rhomboids

## 2024-03-01 PROBLEM — Z01.810 PREOPERATIVE CARDIOVASCULAR EXAMINATION: Status: RESOLVED | Noted: 2024-01-31 | Resolved: 2024-03-01

## 2024-03-12 ENCOUNTER — OFFICE VISIT (OUTPATIENT)
Age: 74
End: 2024-03-12

## 2024-03-12 VITALS
HEIGHT: 64 IN | BODY MASS INDEX: 28.04 KG/M2 | HEART RATE: 76 BPM | SYSTOLIC BLOOD PRESSURE: 116 MMHG | DIASTOLIC BLOOD PRESSURE: 67 MMHG

## 2024-03-12 DIAGNOSIS — Z98.890 PERSONAL HISTORY OF SPINE SURGERY: ICD-10-CM

## 2024-03-12 DIAGNOSIS — S22.040D COMPRESSION FRACTURE OF T4 VERTEBRA WITH ROUTINE HEALING, SUBSEQUENT ENCOUNTER: Primary | ICD-10-CM

## 2024-03-12 PROCEDURE — 99024 POSTOP FOLLOW-UP VISIT: CPT | Performed by: NEUROLOGICAL SURGERY

## 2024-03-13 NOTE — PROGRESS NOTES
[facility], [facility] NEUROSCIENCE [place], [facility] NEUROSURGERY  [address], [address]  [place] [address]  Dept: [phone]  Dept Fax: [phone]     Patient:  Kaylee Purvis  YOB: 1950  Date: 3/13/24      Chief Complaint   Patient presents with    Post-Op Check     4 week post op. PT ordered at last visit but has not started yet   T2-6 posterior instrumentation fusion 1/31/24           HPI:     Ms. Purvis returns to the office today for further follow-up after undergoing a T2-T6 posterior instrumented fusion to treat a T4 vertebral body fracture.  She continues to have pain and soreness between the shoulder blades.  She continues to deny any altered sensation or weakness in the extremities.  She has not had any significant change in her symptoms since she was here last.  She did have an initial physical therapy evaluation, and her first session is scheduled for next week.  She did have a brief episode of what she described as chest tightness in the office today.  This resolved quickly with a small drink of water.  She says that she does have episodes like this on occasion.  I suggested that she needs to follow-up with her primary care doctor regarding the symptoms.  At the symptoms persisted I would have recommended that she go to the emergency department, but the symptoms did subside with a simple drink of water.        Physical Exam:      /67 (Site: Left Upper Arm, Position: Sitting)   Pulse 76   Ht 1.626 m (5' 4\")   BMI 28.04 kg/m²     She is awake, alert, and in no acute distress.  She answers questions appropriately with clear and fluent speech.  Her cranial nerves are grossly intact.  She is moving her extremities well with good strength.  Sensation is intact to light touch throughout.  The incision is healing well.  Her gait is steady.    Imaging: We again briefly reviewed the intraoperative O-arm images showing the

## 2024-03-25 ENCOUNTER — HOSPITAL ENCOUNTER (OUTPATIENT)
Dept: PHYSICAL THERAPY | Age: 74
Setting detail: THERAPIES SERIES
Discharge: HOME | End: 2024-03-25
Payer: MEDICARE

## 2024-03-25 PROCEDURE — 97110 THERAPEUTIC EXERCISES: CPT

## 2024-03-25 PROCEDURE — 97161 PT EVAL LOW COMPLEX 20 MIN: CPT

## 2024-03-25 NOTE — PLAN OF CARE
FUSION  Open treatment of thoracic fracture  Posterior segmental instrumentation at T2-T6  Posterior onlay arthrodesis at T2-T6  Use of computer-aided stereotactic navigation in the spine        Pt notes she has a lot of stiffness, some new SOB that is new since surgery. Noting fatigue and pain/SOB limit daily activities as noted below.            Precautions: no lifting heavy, avoiding excessive flexion per pt     Prior Level of Function: walking frequently for shopping/stores, independent and pain-free with all ADL/IADLs        Current Level of Function: taking breaks with dressing, cooking, sister in law going to grocery store for her, not picking items up off the ground, walking for exercise and frequent walking, not lifting anything currently, all ADL/IADLs take a long time d/t rest needed, difficulty with sit<>stand especially after sitting for awhile, difficulty sleeping         Assessment: Kaylee Purvis is a 75yo female who presents with impairments consistent with post-op T2-T6 fusion on 1/31/24 after sustaining a fall down steps. Pt will benefit from skilled physical therapy in order to: improve cervical and thoracic spinal mobility, reduce muscle spasm, increase thoracic extensor strength, improve resting posture, improve overhead BUE mobility, and overall promote improved tolerance with reduced pain to all ADL/IADLs.      Problems:    [] ? Back Pain:                         [x] ? Cervical Pain:        [x] ? ROM:                     [x] ? Strength:    [x] ? Function:  [x] Postural Deviations  [x] Gait Deviations  [] Other:                            STG: (to be met in 8 treatments)  ? Pain: No more than 4/10 max pain in upper back with all ADL/IADLs  ? ROM: No more than 50% AROM deficits in thoracic spine with no pain at end range to indicate improving flexiblity post-op  ? Strength:   At least 4/5 scapular strength bilat for improved upright posture  At least 4+/5 gross RTC strength bilat for

## 2024-03-25 NOTE — CONSULTS
Upper shoulders down to low back      Descriptors: shooting, numb, aching  Pain altered Tx:  [] Yes  [x] No  Action:   Symptoms:  [] Improving [] Worsening [x] Same   Better:  sitting and resting back  Worse: standing too long  Sleep: [] OK    [x] Disturbed     Working: Disabled   Job/ADL Description: N/A    Function:  Hand Dominance  [x] Right  [] Left        Driving:  [] Yes   [x] No        Physical Activity:   [] Yes   [x] No :       Red Flags:      Yes  No Comments   Fatigue []  [x]      Fever/chills/sweats []  [x]     Mental status change []  [x]      Paresthesias/numbness/weakness []  [x]      Unexplained weight changes []  [x]      Lightheaded/dizziness []  [x]     Shortness of breath [x]  []   New since surgery with exertion   Increased # of falls []  [x]     Bowel/bladder issues []  [x]     Loss of fine motor control []  [x]         Home Environment: Lives alone  in first floor duplex with 1-2 CHERRY and right railings at entry. 1st-floor bathroom with 1st-floor bedroom set up. Tub shower.     Available assistive devices: rollator      PMHx: [] Unremarkable [] Diabetes [x] HTN  [] Pacemaker   [] MI/Heart Problems [] Cancer [] Arthritis [] Other:              [x] Refer to full medical chart  In EPIC     Comorbidities:   [] Obesity [] Dialysis  [] Other:   [] Asthma/COPD [] Dementia [] Other:   [] Stroke [] Sleep apnea [] Other:   [] Vascular disease [] Rheumatic disease [] Other:       Tests: [] X-Ray:    [] MRI:     [] Other:    Medications: [x] Refer to full medical record [] None [] Other:  Allergies:      [x] Refer to full medical record [] None [] Other:          Objective:    Observation:  OBSERVATION No Deficit Deficit Not Tested Comments   Posture    x   FHP, rounded shoulders,    Palpation [] [x] [] TTP throughout thoracic and cervical paraspinals, rhomboids/upper traps bilat (L>R)   Sensation [] [x] [] Numbness reported around L post scapula   Muscle spasm [] [x] []  See palpation         Neurological

## 2024-04-02 ENCOUNTER — HOSPITAL ENCOUNTER (OUTPATIENT)
Dept: PHYSICAL THERAPY | Age: 74
Setting detail: THERAPIES SERIES
Discharge: HOME | End: 2024-04-02
Payer: MEDICARE

## 2024-04-02 ENCOUNTER — HOSPITAL ENCOUNTER (OUTPATIENT)
Dept: GENERAL RADIOLOGY | Age: 74
Discharge: HOME | End: 2024-04-04
Payer: MEDICARE

## 2024-04-02 ENCOUNTER — HOSPITAL ENCOUNTER (OUTPATIENT)
Age: 74
Discharge: HOME | End: 2024-04-04
Payer: MEDICARE

## 2024-04-02 DIAGNOSIS — Z98.890 PERSONAL HISTORY OF SPINE SURGERY: ICD-10-CM

## 2024-04-02 DIAGNOSIS — S22.040D COMPRESSION FRACTURE OF T4 VERTEBRA WITH ROUTINE HEALING, SUBSEQUENT ENCOUNTER: ICD-10-CM

## 2024-04-02 PROCEDURE — 97110 THERAPEUTIC EXERCISES: CPT

## 2024-04-02 PROCEDURE — 72070 X-RAY EXAM THORAC SPINE 2VWS: CPT

## 2024-04-02 PROCEDURE — 97140 MANUAL THERAPY 1/> REGIONS: CPT

## 2024-04-02 NOTE — FLOWSHEET NOTE
Demo  [] Written  Comprehension of Education:  [x] Verbalizes understanding.  [x] Demonstrates understanding.  [] Needs review.  [] Demonstrates/verbalizes HEP/Ed previously given.     Access Code: [email or web address]  URL: [email or web address]  Date: 03/25/2024  Prepared by: Home [place]     Exercises  - Seated Flexion Stretch  - 2-3 x daily - 7 x weekly - 1 sets - 10 reps  - Seated Thoracic Lumbar Extension  - 2-3 x daily - 7 x weekly - 1 sets - 10 reps  - Seated Trunk Rotation Stretch  - 2-3 x daily - 7 x weekly - 1 sets - 10 reps  - Seated Lateral Trunk Stretch on Swiss Ball  - 2-3 x daily - 7 x weekly - 1 sets - 10 reps  - Seated Shoulder Rolls  - 2-3 x daily - 7 x weekly - 3 sets - 10 reps  - Shoulder External Rotation and Scapular Retraction  - 2-3 x daily - 7 x weekly - 1 sets - 10 reps  - Seated Upper Trapezius Stretch  - 1 x daily - 7 x weekly - 3 sets - 30 sec hold    Plan: [x] Continue current frequency toward long and short term goals.    [x] Specific Instructions for subsequent treatments: above as tolerated, continue manual interventions     Frequency:  2 x/week for 16 visits       Time In: 302 pm            Time Out: 340 pm    Electronically signed by:  Dione Bell PTA

## 2024-04-05 ENCOUNTER — HOSPITAL ENCOUNTER (OUTPATIENT)
Dept: PHYSICAL THERAPY | Age: 74
Setting detail: THERAPIES SERIES
Discharge: HOME | End: 2024-04-05
Payer: MEDICARE

## 2024-04-05 PROCEDURE — 97110 THERAPEUTIC EXERCISES: CPT

## 2024-04-05 PROCEDURE — 97140 MANUAL THERAPY 1/> REGIONS: CPT

## 2024-04-05 NOTE — FLOWSHEET NOTE
improve resting posture, improve overhead BUE mobility, and overall promote improved tolerance with reduced pain to all ADL/IADLs.      STG: (to be met in 8 treatments)  ? Pain: No more than 4/10 max pain in upper back with all ADL/IADLs  ? ROM: No more than 50% AROM deficits in thoracic spine with no pain at end range to indicate improving flexiblity post-op  ? Strength:   At least 4/5 scapular strength bilat for improved upright posture  At least 4+/5 gross RTC strength bilat for improved lifting ability  ? Function:   Able to complete all dressing/bathing without taking rest breaks d/t pain or SOB  Able to cook for at least 30 min standing without more than minimal (<3/10) pain reported  Able to walk for at least 15 min consecutively without more than minimal (<3/10) pain reported  Independent with Home Exercise Programs  Demonstrate Knowledge of fall prevention     LTG: (to be met in 16 treatments)  ? Pain: No more than 2/10 max pain in upper back with all ADL/IADLs  ? ROM: B shoulder flex/abd AROM to at least 160/140, respectfully, without pain reported to allow improved overhead use of BUEs for ADL/IADLs  ? Strength:   At least 4+/5 scapular strength bilat for further improved upright posture  At least 5/5 gross RTC strength bilat for improved lifting ability  ? Function:   Able to cook without rest breaks required d/t pain or SOB  Able to walk for unlimited distances without more than mild pain in neck/mid back  Oswestry to no more than 30% impaired to indicate subjective improvement in condition        Patient goals: no pain    Pt. Education:  [x] Yes  [] No  [x] Reviewed Prior HEP/Ed  Method of Education: [x] Verbal  [x] Demo  [] Written  Comprehension of Education:  [x] Verbalizes understanding.  [x] Demonstrates understanding.  [] Needs review.  [] Demonstrates/verbalizes HEP/Ed previously given.     Access Code: [email or web address]  URL: [email or web address]  Date: 03/25/2024  Prepared by: Home

## 2024-04-09 ENCOUNTER — HOSPITAL ENCOUNTER (OUTPATIENT)
Dept: PHYSICAL THERAPY | Age: 74
Setting detail: THERAPIES SERIES
Discharge: HOME | End: 2024-04-09
Payer: MEDICARE

## 2024-04-09 PROCEDURE — 97140 MANUAL THERAPY 1/> REGIONS: CPT

## 2024-04-09 PROCEDURE — 97110 THERAPEUTIC EXERCISES: CPT

## 2024-04-09 NOTE — FLOWSHEET NOTE
[x] [facility]  Outpatient Rehabilitation &  Therapy  [address]  [phone]  [phone]     Physical Therapy Daily Treatment Note    Date:  2024  Patient Name:  Kaylee Purvis   \"Pat\" :  1950  MRN: [id number]  Physician: David Molina MD                                    Insurance: [facility] Medicare Dual (BMN)  Medical Diagnosis: S22.040D (ICD-10-CM) - Compression fracture of T4 vertebra with routine healing, subsequent encounter   Rehab Codes: M54.2, M25.60, M62.838, M62.81, R29.3  Onset Date: 24 DOS                  Next 's appt.: 24    Visit# / total visits: ; Progress note for Medicare patient due at visit 10     Cancels/No Shows: 0/0    Subjective:    Pain: 8/10  Location: Upper shoulders down to low back                                      Descriptors: shooting, numb, aching  Pain altered Tx:  [] Yes  [x] No  Action:   Comments: Patient states feeling \"awful\". Reports does not feel that therapy has been helping with regards to pain and tension in neck. Reports as she does more activity tension in the shoulders and neck increases and is very painful.     Objective:  Modalities: Large HP to posterior T-spine up into UTs while in supine - 10 min - deferred 24, will complete at home  Precautions: no heavy lifting (no lb restriction given per pt or doctor's notes/referral)  Exercises:  Exercise Reps/ Time Weight/ Level Comments    UBE 4min L1 Fwd - Initiated     Pulley 2 min  Flexion x   Corner stretch 10x5\"   x                 Supine       Manual/PROM 16 min total  - - seated hypervolt to shlomo rhomboid/UT  - seated TPR to bilat rhomboid/UT     x   Cervical retraction 15x3\"  Tactile cues into therapist hands - post manual    Scapular retraction 15x3\"  Pushing shoulder blades down to mat           Seated          Fwd flexion stretch 20x   Slow, progressive range; round back  Patient enjoys this stretch x   Extension over bolster 10x3\"  Therapist hold

## 2024-04-12 ENCOUNTER — HOSPITAL ENCOUNTER (OUTPATIENT)
Dept: PHYSICAL THERAPY | Age: 74
Setting detail: THERAPIES SERIES
Discharge: HOME | End: 2024-04-12
Payer: MEDICARE

## 2024-04-12 NOTE — FLOWSHEET NOTE
[x] [facility]  Outpatient Rehabilitation &  Therapy  [address]  [phone]  [phone]     Therapy Cancel/No Show note    Date: 2024  Patient: Kaylee Purvis  : 1950  MRN: [id number]    Cancels/No Shows to date:     For today's appointment patient:    [x]  Cancelled    [] Rescheduled appointment    [] No-show     Reason given by patient:    []  Patient ill    [x]  Conflicting appointment    [] No transportation      [] Conflict with work    [] No reason given    [] Weather related    [] COVID-19    [] Other:      Comments:        [x] Next appointment was confirmed PREVIOUSLY    Electronically signed by: Dione Bell, PTA

## 2024-04-16 ENCOUNTER — HOSPITAL ENCOUNTER (OUTPATIENT)
Dept: PHYSICAL THERAPY | Age: 74
Setting detail: THERAPIES SERIES
Discharge: HOME | End: 2024-04-16
Payer: MEDICARE

## 2024-04-16 NOTE — FLOWSHEET NOTE
[x] [facility]  Outpatient Rehabilitation &  Therapy  [address]  [phone]  [phone]     Therapy Cancel/No Show note    Date: 2024  Patient: Kaylee Purvis  : 1950  MRN: [id number]    Cancels/No Shows to date:     For today's appointment patient:    [x]  Cancelled    [] Rescheduled appointment    [] No-show     Reason given by patient:    []  Patient ill    []  Conflicting appointment    [] No transportation      [] Conflict with work    [x] No reason given    [] Weather related    [] COVID-19    [x] Other:      Comments:  Confirmed next appt.      [x] Next appointment was confirmed     Electronically signed by: Dione Bell PTA

## 2024-04-19 ENCOUNTER — HOSPITAL ENCOUNTER (OUTPATIENT)
Dept: PHYSICAL THERAPY | Age: 74
Setting detail: THERAPIES SERIES
Discharge: HOME | End: 2024-04-19
Payer: MEDICARE

## 2024-04-19 PROCEDURE — 97140 MANUAL THERAPY 1/> REGIONS: CPT

## 2024-04-19 PROCEDURE — 97110 THERAPEUTIC EXERCISES: CPT

## 2024-04-19 NOTE — FLOWSHEET NOTE
Seated          Fwd flexion stretch 20x   Slow, progressive range; round back  Patient enjoys this stretch x   Extension over bolster 10x3\"  Therapist hold bolster at thoracic spine and assist thoracic ext over for pec stretch and ROM  Post manual 4/19 x   Rotation stretch 10x ea   Verbal cues for trunk rotation in addition to     Sidebend stretch 10x ea   \" \"    Scap retract 12x3\"   Tactile cueing x   Posterior shld rolls 15x       Bilateral ER w/ scap retract 15x       Upper trap stretch 5x10\"  Demonstration for proper technique x   Other:     Specific Instructions for next treatment:  - slow, gentle progression of thoracic and cervical mobility  - pec/upper trap stretching (consider progressive repeated range vs long holds d/t tolerance)  -  scap retract/depress strengthening  - cervical retraction mobility and strengthening  - RTC strengthening especially progressing to overhead         Treatment Charges: Mins Units   []  Modalities     [x]  Ther Exercise 35 2   [x]  Manual Therapy 13 1   []  Ther Activities     []  Neuro Re-ed     []  Vasocompression     [] Gait     []  Other     Total Billable time 48 3       Assessment: [x] Progressing toward goals. Addition of further thoracic stretching and ROM with open book stretching and lat stretching utilizing windowsill/wall corner. Patient with good tolerance to progressions this date as noted and stated overall relief in thoracic/rhomboid tension post treatment this date, especially compared to previous treatment. Use of lacrosse ball to further release tension through musculature as noted as well with good tolerance. Patient preferred pressure and use of lacrosse ball to hypervolt pulsating pressure. Educated for completion of additional stretching implemented this date for home with good verbal understanding.        [] No change.     [] Other:       [x] Patient would continue to benefit from skilled physical therapy services in order to: improve cervical and

## 2024-04-23 ENCOUNTER — OFFICE VISIT (OUTPATIENT)
Age: 74
End: 2024-04-23

## 2024-04-23 VITALS — DIASTOLIC BLOOD PRESSURE: 72 MMHG | SYSTOLIC BLOOD PRESSURE: 129 MMHG | HEART RATE: 79 BPM

## 2024-04-23 DIAGNOSIS — S22.040D COMPRESSION FRACTURE OF T4 VERTEBRA WITH ROUTINE HEALING, SUBSEQUENT ENCOUNTER: Primary | ICD-10-CM

## 2024-04-23 DIAGNOSIS — G89.18 ACUTE POST-OPERATIVE PAIN: ICD-10-CM

## 2024-04-23 DIAGNOSIS — Z98.890 PERSONAL HISTORY OF SPINE SURGERY: ICD-10-CM

## 2024-04-23 PROCEDURE — 99024 POSTOP FOLLOW-UP VISIT: CPT | Performed by: NEUROLOGICAL SURGERY

## 2024-04-23 RX ORDER — TRIAMTERENE AND HYDROCHLOROTHIAZIDE 37.5; 25 MG/1; MG/1
TABLET ORAL
COMMUNITY
Start: 2024-04-08

## 2024-04-23 RX ORDER — LIDOCAINE 50 MG/G
1 PATCH TOPICAL DAILY
Qty: 10 PATCH | Refills: 1 | Status: SHIPPED | OUTPATIENT
Start: 2024-04-23

## 2024-04-25 ENCOUNTER — HOSPITAL ENCOUNTER (OUTPATIENT)
Dept: PHYSICAL THERAPY | Age: 74
Setting detail: THERAPIES SERIES
Discharge: HOME | End: 2024-04-25
Payer: MEDICARE

## 2024-04-25 PROCEDURE — 97530 THERAPEUTIC ACTIVITIES: CPT

## 2024-04-25 PROCEDURE — 97140 MANUAL THERAPY 1/> REGIONS: CPT

## 2024-04-25 PROCEDURE — 97110 THERAPEUTIC EXERCISES: CPT

## 2024-04-25 NOTE — PROGRESS NOTES
[facility], [facility], [facility] NEUROSURGERY  [address], [address]  [place] [address]  Dept: [phone]  Dept Fax: [phone]     Patient:  Kaylee Purvis  YOB: 1950  Date: 4/23/24      Chief Complaint   Patient presents with    Post-Op Check     6wk PO f/u with XR review. T2-T6 fusion instrumentation 1/31/24            HPI:     Ms. Purvis returns to the office for further follow-up after undergoing posterior instrumented fusion from T2-T6 to treat a T4 vertebral body fracture.  Her surgery was done on January 31, 2024.  She has continued to have some sharp pains in the back.  These are occasional.  They do tend to improve with rest.  She has been going to physical therapy, but this does not seem to have helped so far.  She finds that she does need to sit down to take a break if she is doing any work that requires being up for any length of time.  She specifically mentions cooking, and states that after 10 minutes she usually has to take a break.  She denies any lower extremity symptoms or any difficulty with bowel or bladder function.      Physical Exam:      /72   Pulse 79     She is awake, alert, and in no acute distress.  She answers questions appropriately with clear and fluent speech.  Her cranial nerves are grossly intact.  She is moving her extremities well without gross deficit.  Her gait is steady.    Imaging: I personally reviewed plain films of the thoracic spine, as well as the radiologist's report.  These were obtained on April 2 in the [facility] system.  This shows the hardware in stable position with good alignment and no evidence of complication.    Assessment and Plan:     1. Compression fracture of T4 vertebra with routine healing, subsequent encounter    2. Personal history of spine surgery    3. Acute post-operative pain        Ms. Purvis continues to have difficulty with pain in the back in

## 2024-04-25 NOTE — FLOWSHEET NOTE
[x] [facility]  Outpatient Rehabilitation &  Therapy  [address]  [phone]  [phone]     Physical Therapy Daily Treatment Note    Date:  2024  Patient Name:  Kaylee Purvis   \"Pat\" :  1950  MRN: [id number]  Physician: David Molina MD                                    Insurance: [facility] Medicare Dual (BMN)  Medical Diagnosis: S22.040D (ICD-10-CM) - Compression fracture of T4 vertebra with routine healing, subsequent encounter   Rehab Codes: M54.2, M25.60, M62.838, M62.81, R29.3  Onset Date: 24 DOS                  Next 's appt.: 24    Visit# / total visits: ; Progress note for Medicare patient due at visit 10     Cancels/No Shows: 2/0    Subjective:    Pain: 10/10  Location: Upper shoulders down to low back                                      Descriptors: shooting, numb, aching  Pain altered Tx:  [] Yes  [x] No  Action:   Comments: Patient notes she is in 10/10 pain today, reporting pain is so bad and just no relief. Pain is worst in upper shoulders.    Objective:  Modalities: Large HP to posterior T-spine up into UTs while in supine - 10 min - deferred, will complete at home  Precautions: no heavy lifting (no lb restriction given per pt or doctor's notes/referral)  Exercises: Exercises completed 2024 marked with \"x\":  Exercise Reps/ Time Weight/ Level Comments    Seated        Manual 30 min total  - lacrosse ball MFR throughout upper traps (no change in tension)  - layer one myofacial release to B upper traps  - layer 2-3 MFR to B upper traps, levator scap  - muscle roll throughout upper traps/levator scap bilat   x          Open pec stretch/thoracic ext against bolster 2x20  Manual ovp by therapist x   Bilateral ER 3x15   x   Chin tuck - manual stretch 3x10, 5\"   x   Chin tuck AROM 3x10  Max cues and demonstration for proper form; independent by end of visit x   Posterior shoulder rolls 5x10  All throughout visit x   Fwd physioball roll outs 3

## 2024-04-30 ENCOUNTER — HOSPITAL ENCOUNTER (OUTPATIENT)
Dept: PHYSICAL THERAPY | Age: 74
Setting detail: THERAPIES SERIES
Discharge: HOME | End: 2024-04-30
Payer: MEDICARE

## 2024-04-30 PROCEDURE — 97140 MANUAL THERAPY 1/> REGIONS: CPT

## 2024-04-30 PROCEDURE — 97110 THERAPEUTIC EXERCISES: CPT

## 2024-04-30 NOTE — FLOWSHEET NOTE
ADL/IADLs  ? Strength:   At least 4+/5 scapular strength bilat for further improved upright posture  At least 5/5 gross RTC strength bilat for improved lifting ability  ? Function:   Able to cook without rest breaks required d/t pain or SOB  Able to walk for unlimited distances without more than mild pain in neck/mid back  Oswestry to no more than 30% impaired to indicate subjective improvement in condition        Patient goals: no pain    Pt. Education:  [x] Yes  [] No  [x] Reviewed Prior HEP/Ed  Method of Education: [x] Verbal  [x] Demo  [] Written  Comprehension of Education:  [x] Verbalizes understanding.  [x] Demonstrates understanding.  [] Needs review.  [] Demonstrates/verbalizes HEP/Ed previously given.     Access Code: [email or web address]  URL: [email or web address]  Date: 03/25/2024  Prepared by: Home Mead     Exercises  - Seated Flexion Stretch  - 2-3 x daily - 7 x weekly - 1 sets - 10 reps  - Seated Thoracic Lumbar Extension  - 2-3 x daily - 7 x weekly - 1 sets - 10 reps  - Seated Trunk Rotation Stretch  - 2-3 x daily - 7 x weekly - 1 sets - 10 reps  - Seated Lateral Trunk Stretch on Swiss Ball  - 2-3 x daily - 7 x weekly - 1 sets - 10 reps  - Seated Shoulder Rolls  - 2-3 x daily - 7 x weekly - 3 sets - 10 reps  - Shoulder External Rotation and Scapular Retraction  - 2-3 x daily - 7 x weekly - 1 sets - 10 reps  - Seated Upper Trapezius Stretch  - 1 x daily - 7 x weekly - 3 sets - 30 sec hold    Access Code: [email or web address]  URL: [email or web address]  Date: 04/25/2024  Prepared by: Home Mead    Exercises  - Seated Thoracic Lumbar Extension with Pectoralis Stretch  - 1-2 x daily - 7 x weekly - 3 min hold  - Seated Cervical Retraction Passive Loaded  - 2 x daily - 7 x weekly - 3 sets - 10 reps  - Seated Diaphragmatic Breathing  - 1 x daily - 7 x weekly - 3 sets - 10 reps    Plan: [x] Continue current frequency toward long and short term goals.    [x] Specific Instructions for subsequent

## 2024-05-02 ENCOUNTER — HOSPITAL ENCOUNTER (OUTPATIENT)
Dept: PHYSICAL THERAPY | Age: 74
Setting detail: THERAPIES SERIES
Discharge: HOME | End: 2024-05-02
Payer: MEDICARE

## 2024-05-02 PROCEDURE — 97110 THERAPEUTIC EXERCISES: CPT

## 2024-05-02 PROCEDURE — 97140 MANUAL THERAPY 1/> REGIONS: CPT

## 2024-05-02 NOTE — FLOWSHEET NOTE
for sitting  - effect of forward head posture on muscles in neck  - roll of constant tightness/shoulder elevation throughout the day on tension/pain in shoulders  - teaching re: breathing and intermittent postural check in to \"drop shoulders\"  *billed with theract                         UBE 4min L1 Fwd - x   Pulley 2 min  Flexion    Corner stretch 10x5\"             Standing       Lat stretch 5x10\"   Bilat arms outstretched to windowsill, pushing chest down towards floor x   Open book  5x10\" ea side   x          Supine       Manual/PROM 12 min  - - seated hypervolt to shlomo rhomboid/UT  - seated lacrosse ball rolling to shlomo rhomboid/UT         Cervical retraction 15x3\"  Tactile cues into therapist hands - post manual    Scapular retraction 15x3\"  Pushing shoulder blades down to mat                  Seated          Fwd flexion stretch 20x   Slow, progressive range; round back  Patient enjoys this stretch    Extension over bolster 10x3\"  Therapist hold bolster at thoracic spine and assist thoracic ext over for pec stretch and ROM  Post manual 4/19    Rotation stretch 10x ea   Verbal cues for trunk rotation in addition to     Sidebend stretch 10x ea   \" \"    Scap retract 12x3\"   Tactile cueing    Posterior shld rolls 15x       Bilateral ER w/ scap retract 15x       Upper trap stretch 5x10\"  Demonstration for proper technique    Other:     Specific Instructions for next treatment:  - slow, gentle progression of thoracic and cervical mobility  - pec/upper trap stretching (consider progressive repeated range vs long holds d/t tolerance)  -  scap retract/depress strengthening  - cervical retraction mobility and strengthening  - RTC strengthening especially progressing to overhead         Treatment Charges: Mins Units   []  Modalities     [x]  Ther Exercise 30 2   [x]  Manual Therapy 23 2   []  Ther Activities     []  Neuro Re-ed     []  Vasocompression     [] Gait     []  Other     Total Billable time 53 4

## 2024-05-07 ENCOUNTER — HOSPITAL ENCOUNTER (OUTPATIENT)
Dept: PHYSICAL THERAPY | Age: 74
Setting detail: THERAPIES SERIES
Discharge: HOME | End: 2024-05-07
Payer: MEDICARE

## 2024-05-07 NOTE — FLOWSHEET NOTE
[x] [facility]  Outpatient Rehabilitation &  Therapy  [address]  [phone]  [phone]     Therapy Cancel/No Show note    Date: 2024  Patient: Kaylee Purvis  : 1950  MRN: [id number]    Cancels/No Shows to date: 3/0    For today's appointment patient:    [x]  Cancelled    [] Rescheduled appointment    [] No-show     Reason given by patient:    []  Patient ill    []  Conflicting appointment    [] No transportation      [] Conflict with work    [x] No reason given    [] Weather related    [] COVID-19    [x] Other:      Comments:        [x] Next appointment was confirmed PREVIOUSLY    Electronically signed by: Dione Bell, PTA

## 2024-05-09 ENCOUNTER — HOSPITAL ENCOUNTER (OUTPATIENT)
Dept: PHYSICAL THERAPY | Age: 74
Setting detail: THERAPIES SERIES
Discharge: HOME | End: 2024-05-09
Payer: MEDICARE

## 2024-05-09 PROCEDURE — 97110 THERAPEUTIC EXERCISES: CPT

## 2024-05-09 PROCEDURE — 97140 MANUAL THERAPY 1/> REGIONS: CPT

## 2024-05-09 NOTE — PROGRESS NOTES
[x] [facility]  Outpatient Rehabilitation &  Therapy  [address]  [phone]  [phone] [] [facility]  Outpatient Rehabilitation &  Therapy  [address]  P: [phone]  F: [phone] [] [facility]  Outpatient Rehabilitation &  Therapy  [address]  P: [phone]  F: [phone] [] [facility]  Outpatient Rehabilitation &  Therapy  [address]  [phone]  [phone] [] [facility]  Outpatient Rehabilitation &  Therapy  [address]   P: [phone]  F: [phone]  [] [facility]  Outpatient Rehabilitation &  Therapy  [address]  P: [phone]  F: [phone] [] [facility]  Outpatient Rehabilitation &  Therapy  [address]  P: [phone]  F: [phone] [] [facility]  Outpatient Rehabilitation &  Therapy  [address]  P: [phone]  F: [phone] [] [facility]  Outpatient Rehabilitation &  Therapy  [address]  P: [phone]  F: [phone]  [] [facility] Outpatient Rehabilitation &  Therapy  [address]  P: [phone]  F: [phone]     Physical Therapy Progress Note    Date: 2024      Patient: Kaylee Purvis  : 1950  MRN: [id number]    Physician: David Molina MD                                    Insurance: [facility] Medicare Dual (BMN)  Medical Diagnosis: S22.040D (ICD-10-CM) - Compression fracture of T4 vertebra with routine healing, subsequent encounter   Rehab Codes: M54.2, M25.60, M62.838, M62.81, R29.3  Onset Date: 24 DOS                  Next Dr.'s appt.: 24     Visit# / total visits: ; Progress note for Medicare patient due at visit 10                                    Cancels/No Shows: 20  Date range of services: 3/25/24 to

## 2024-05-09 NOTE — FLOWSHEET NOTE
Ther Activities     []  Neuro Re-ed     []  Vasocompression     [] Gait     []  Other     Total Billable time 40 3       Assessment: [] Progressing toward goals.     [x] No change. Minimal to no progress made at present - pt with significant upper trap/levator scap tightness bilat (L>R) impacting tolerance to all ADL/IADLs and inability to progress exercises d/t high pain levels. Pt notes she's not eating as much because it takes so much effort to make a meal and has increased pain >10/10.      [x] Other: Reaching out to pt's referring physician to update on lack of progress, and possible consideration of next steps, pt interested in discussing trigger point injections.  On hold for PT until patient sees doctor to determine next steps d/t lack of benefit with ~1.5mo of PT.     [x] Patient would continue to benefit from skilled physical therapy services in order to: improve cervical and thoracic spinal mobility, reduce muscle spasm, increase thoracic extensor strength, improve resting posture, improve overhead BUE mobility, and overall promote improved tolerance with reduced pain to all ADL/IADLs.      STG: (to be met in 8 treatments) - Assessed on 5/9/24 by Home Mead, PT:  ? Pain: No more than 4/10 max pain in upper back with all ADL/IADLs - NOT MET, 8/10 pain, >10/10 with activity around the house  ? ROM: No more than 50% AROM deficits in thoracic spine with no pain at end range to indicate improving flexiblity post-op - Partially MET, 90% limited thoracic ext, 50% limited B rotation and flexion  ? Strength:   At least 4/5 scapular strength bilat for improved upright posture - NOT MET, 3/5  At least 4+/5 gross RTC strength bilat for improved lifting ability - NOT MET, 4/5  ? Function:   Able to complete all dressing/bathing without taking rest breaks d/t pain or SOB - NOT MET, some intermittent seated rest breaks still  Able to cook for at least 30 min standing without more than minimal (<3/10) pain reported

## 2024-05-17 ENCOUNTER — OFFICE VISIT (OUTPATIENT)
Age: 74
End: 2024-05-17
Payer: MEDICARE

## 2024-05-17 VITALS — DIASTOLIC BLOOD PRESSURE: 75 MMHG | SYSTOLIC BLOOD PRESSURE: 121 MMHG | HEART RATE: 66 BPM

## 2024-05-17 DIAGNOSIS — M54.6 CHRONIC MIDLINE THORACIC BACK PAIN: ICD-10-CM

## 2024-05-17 DIAGNOSIS — S22.040D COMPRESSION FRACTURE OF T4 VERTEBRA WITH ROUTINE HEALING, SUBSEQUENT ENCOUNTER: Primary | ICD-10-CM

## 2024-05-17 DIAGNOSIS — Z98.890 PERSONAL HISTORY OF SPINE SURGERY: ICD-10-CM

## 2024-05-17 DIAGNOSIS — G89.29 CHRONIC MIDLINE THORACIC BACK PAIN: ICD-10-CM

## 2024-05-17 PROCEDURE — 1123F ACP DISCUSS/DSCN MKR DOCD: CPT | Performed by: NEUROLOGICAL SURGERY

## 2024-05-17 PROCEDURE — 3074F SYST BP LT 130 MM HG: CPT | Performed by: NEUROLOGICAL SURGERY

## 2024-05-17 PROCEDURE — G8399 PT W/DXA RESULTS DOCUMENT: HCPCS | Performed by: NEUROLOGICAL SURGERY

## 2024-05-17 PROCEDURE — 1090F PRES/ABSN URINE INCON ASSESS: CPT | Performed by: NEUROLOGICAL SURGERY

## 2024-05-17 PROCEDURE — G8420 CALC BMI NORM PARAMETERS: HCPCS | Performed by: NEUROLOGICAL SURGERY

## 2024-05-17 PROCEDURE — 99213 OFFICE O/P EST LOW 20 MIN: CPT | Performed by: NEUROLOGICAL SURGERY

## 2024-05-17 PROCEDURE — 4004F PT TOBACCO SCREEN RCVD TLK: CPT | Performed by: NEUROLOGICAL SURGERY

## 2024-05-17 PROCEDURE — 3078F DIAST BP <80 MM HG: CPT | Performed by: NEUROLOGICAL SURGERY

## 2024-05-17 PROCEDURE — G8427 DOCREV CUR MEDS BY ELIG CLIN: HCPCS | Performed by: NEUROLOGICAL SURGERY

## 2024-05-17 PROCEDURE — 3017F COLORECTAL CA SCREEN DOC REV: CPT | Performed by: NEUROLOGICAL SURGERY

## 2024-05-17 RX ORDER — OXYCODONE HYDROCHLORIDE AND ACETAMINOPHEN 5; 325 MG/1; MG/1
1 TABLET ORAL EVERY 6 HOURS PRN
Qty: 28 TABLET | Refills: 0 | Status: SHIPPED | OUTPATIENT
Start: 2024-05-17 | End: 2024-05-24

## 2024-05-21 ENCOUNTER — TELEPHONE (OUTPATIENT)
Age: 74
End: 2024-05-21

## 2024-05-21 DIAGNOSIS — S22.040D COMPRESSION FRACTURE OF T4 VERTEBRA WITH ROUTINE HEALING, SUBSEQUENT ENCOUNTER: Primary | ICD-10-CM

## 2024-05-21 NOTE — TELEPHONE ENCOUNTER
Dr Molina saw Kaylee on 5/17/24. He wanted her to have a CT before her next appointment on 6/6/24.He did not put in the CT order. Can one of you please add the order.

## 2024-05-28 ENCOUNTER — HOSPITAL ENCOUNTER (OUTPATIENT)
Dept: CT IMAGING | Age: 74
Discharge: HOME | End: 2024-05-30
Attending: NEUROLOGICAL SURGERY
Payer: MEDICARE

## 2024-05-28 DIAGNOSIS — S22.040D COMPRESSION FRACTURE OF T4 VERTEBRA WITH ROUTINE HEALING, SUBSEQUENT ENCOUNTER: ICD-10-CM

## 2024-05-28 PROCEDURE — 72128 CT CHEST SPINE W/O DYE: CPT

## 2024-06-06 ENCOUNTER — OFFICE VISIT (OUTPATIENT)
Age: 74
End: 2024-06-06
Payer: MEDICARE

## 2024-06-06 VITALS
DIASTOLIC BLOOD PRESSURE: 67 MMHG | BODY MASS INDEX: 23.56 KG/M2 | HEART RATE: 52 BPM | WEIGHT: 138 LBS | SYSTOLIC BLOOD PRESSURE: 124 MMHG | HEIGHT: 64 IN

## 2024-06-06 DIAGNOSIS — S22.040D COMPRESSION FRACTURE OF T4 VERTEBRA WITH ROUTINE HEALING, SUBSEQUENT ENCOUNTER: Primary | ICD-10-CM

## 2024-06-06 DIAGNOSIS — Z98.890 PERSONAL HISTORY OF SPINE SURGERY: ICD-10-CM

## 2024-06-06 DIAGNOSIS — M54.6 CHRONIC MIDLINE THORACIC BACK PAIN: ICD-10-CM

## 2024-06-06 DIAGNOSIS — G89.29 CHRONIC MIDLINE THORACIC BACK PAIN: ICD-10-CM

## 2024-06-06 PROCEDURE — 1123F ACP DISCUSS/DSCN MKR DOCD: CPT | Performed by: NEUROLOGICAL SURGERY

## 2024-06-06 PROCEDURE — 1090F PRES/ABSN URINE INCON ASSESS: CPT | Performed by: NEUROLOGICAL SURGERY

## 2024-06-06 PROCEDURE — 3078F DIAST BP <80 MM HG: CPT | Performed by: NEUROLOGICAL SURGERY

## 2024-06-06 PROCEDURE — G8399 PT W/DXA RESULTS DOCUMENT: HCPCS | Performed by: NEUROLOGICAL SURGERY

## 2024-06-06 PROCEDURE — G8427 DOCREV CUR MEDS BY ELIG CLIN: HCPCS | Performed by: NEUROLOGICAL SURGERY

## 2024-06-06 PROCEDURE — 3017F COLORECTAL CA SCREEN DOC REV: CPT | Performed by: NEUROLOGICAL SURGERY

## 2024-06-06 PROCEDURE — 4004F PT TOBACCO SCREEN RCVD TLK: CPT | Performed by: NEUROLOGICAL SURGERY

## 2024-06-06 PROCEDURE — G8420 CALC BMI NORM PARAMETERS: HCPCS | Performed by: NEUROLOGICAL SURGERY

## 2024-06-06 PROCEDURE — 3074F SYST BP LT 130 MM HG: CPT | Performed by: NEUROLOGICAL SURGERY

## 2024-06-06 PROCEDURE — 99213 OFFICE O/P EST LOW 20 MIN: CPT | Performed by: NEUROLOGICAL SURGERY

## 2024-06-06 ASSESSMENT — ENCOUNTER SYMPTOMS: BACK PAIN: 1

## 2024-06-06 NOTE — PROGRESS NOTES
Review of Systems   Musculoskeletal:  Positive for arthralgias, back pain, gait problem and myalgias.       
healing, subsequent encounter    2. Chronic midline thoracic back pain    3. Personal history of spine surgery        Ms. Purvis continues to have difficulty with pain in the thoracic spine after suffering a T4 compression fracture and undergoing posterior instrumented fusion from T2-T6.  The imaging is reassuring.  I do not see any evidence of complication.  There actually appears to be restoration of height of the T4 body.  As a result, I would not recommend any further surgical treatment for her symptoms at this time.  I do think an evaluation by pain management would be reasonable.  She might benefit from ablations, trigger point injections, or epidural steroid injections.  We did not plan further follow-up in my office at this time, but she is welcome to contact me at anytime with questions or concerns.      Electronically signed by David Molina MD on 6/6/2024 at 4:31 PM    Please note that this chart was generated using voice recognition Dragon dictation software.  Although every effort was made to ensure the accuracy of this automated transcription, some errors in transcription may have occurred.

## 2024-07-11 ENCOUNTER — HOSPITAL ENCOUNTER (OUTPATIENT)
Dept: PAIN MANAGEMENT | Age: 74
Discharge: HOME | End: 2024-07-11
Payer: MEDICARE

## 2024-07-11 VITALS — WEIGHT: 138 LBS | BODY MASS INDEX: 23.56 KG/M2 | HEIGHT: 64 IN

## 2024-07-11 DIAGNOSIS — M62.830 SPASM OF BACK MUSCLES: ICD-10-CM

## 2024-07-11 DIAGNOSIS — Z98.1 HISTORY OF THORACIC SPINAL FUSION: Primary | ICD-10-CM

## 2024-07-11 DIAGNOSIS — M54.9 MID BACK PAIN: ICD-10-CM

## 2024-07-11 DIAGNOSIS — M79.10 TRIGGER POINT: ICD-10-CM

## 2024-07-11 PROCEDURE — 20553 NJX 1/MLT TRIGGER POINTS 3/>: CPT

## 2024-07-11 PROCEDURE — 2500000003 HC RX 250 WO HCPCS: Performed by: ANESTHESIOLOGY

## 2024-07-11 PROCEDURE — 6360000002 HC RX W HCPCS: Performed by: ANESTHESIOLOGY

## 2024-07-11 PROCEDURE — 99203 OFFICE O/P NEW LOW 30 MIN: CPT

## 2024-07-11 RX ORDER — LIDOCAINE HYDROCHLORIDE 10 MG/ML
5 INJECTION, SOLUTION EPIDURAL; INFILTRATION; INTRACAUDAL; PERINEURAL ONCE
Status: COMPLETED | OUTPATIENT
Start: 2024-07-11 | End: 2024-07-11

## 2024-07-11 RX ORDER — DEXAMETHASONE SODIUM PHOSPHATE 10 MG/ML
10 INJECTION, SOLUTION INTRAMUSCULAR; INTRAVENOUS ONCE
Status: COMPLETED | OUTPATIENT
Start: 2024-07-11 | End: 2024-07-11

## 2024-07-11 RX ADMIN — DEXAMETHASONE SODIUM PHOSPHATE 10 MG: 10 INJECTION, SOLUTION INTRAMUSCULAR; INTRAVENOUS at 16:22

## 2024-07-11 RX ADMIN — LIDOCAINE HYDROCHLORIDE 5 ML: 10 INJECTION, SOLUTION EPIDURAL; INFILTRATION; INTRACAUDAL; PERINEURAL at 16:23

## 2024-07-11 ASSESSMENT — ENCOUNTER SYMPTOMS
GASTROINTESTINAL NEGATIVE: 1
BACK PAIN: 1
SHORTNESS OF BREATH: 1
WHEEZING: 1

## 2024-07-11 NOTE — PROGRESS NOTES
MCG/ACT inhaler Inhale 2 puffs into the lungs every 4 hours as needed for Wheezing 1 Inhaler 0     Current Facility-Administered Medications   Medication Dose Route Frequency Provider Last Rate Last Admin    lidocaine 1 % injection 5 mL  5 mL IntraDERmal Once Isaiah Padilla MD        dexAMETHasone (PF) (DECADRON) injection 10 mg  10 mg IntraMUSCular Once Isaiah Padilla MD           Review of Systems   Constitutional: Negative.  Negative for fever.   HENT: Negative.     Respiratory:  Positive for shortness of breath and wheezing.    Cardiovascular: Negative.    Gastrointestinal: Negative.    Musculoskeletal:  Positive for back pain and gait problem.   Skin: Negative.    Hematological:  Bruises/bleeds easily.         Objective:  General Appearance:  Well-appearing, in no acute distress, uncomfortable and in pain.    Vital signs: (most recent): Height 1.626 m (5' 4\"), weight 62.6 kg (138 lb).  Vital signs are normal.  No fever.    Output: Producing urine and producing stool.    HEENT: Normal HEENT exam.    Lungs:  Normal effort and normal respiratory rate.  She is not in respiratory distress.    Heart: Normal rate.    Extremities: Normal range of motion.  There is no deformity.    Neurological: Patient is alert and oriented to person, place and time.  Normal strength.  Patient has normal coordination.    Pupils:  Pupils are equal, round, and reactive to light.  Pupils are equal.   Skin:  Warm and dry.  No rash or cyanosis.     Assessment & Plan  1. History of thoracic spinal fusion    2. Mid back pain    3. Spasm of back muscles    4. Trigger point        Orders Placed This Encounter   Procedures    TRIGGER POINT 3 OR MORE MUSCLES      Orders Placed This Encounter   Medications    lidocaine 1 % injection 5 mL    dexAMETHasone (PF) (DECADRON) injection 10 mg            Electronically signed by Isaiah Padilla MD on 7/11/2024 at 4:19 PM

## 2024-07-16 ENCOUNTER — HOSPITAL ENCOUNTER (OUTPATIENT)
Age: 74
Discharge: HOME | End: 2024-07-18
Payer: MEDICARE

## 2024-07-16 ENCOUNTER — HOSPITAL ENCOUNTER (OUTPATIENT)
Dept: GENERAL RADIOLOGY | Age: 74
Discharge: HOME | End: 2024-07-18
Payer: MEDICARE

## 2024-07-16 DIAGNOSIS — J44.9 CHRONIC OBSTRUCTIVE PULMONARY DISEASE, UNSPECIFIED COPD TYPE (HCC): ICD-10-CM

## 2024-07-16 PROCEDURE — 71046 X-RAY EXAM CHEST 2 VIEWS: CPT

## 2024-10-11 ENCOUNTER — HOSPITAL ENCOUNTER (OUTPATIENT)
Dept: GENERAL RADIOLOGY | Age: 74
Discharge: HOME | End: 2024-10-13
Payer: MEDICARE

## 2024-10-11 ENCOUNTER — HOSPITAL ENCOUNTER (OUTPATIENT)
Dept: PHYSICAL THERAPY | Age: 74
Setting detail: THERAPIES SERIES
Discharge: HOME | End: 2024-10-11
Payer: MEDICARE

## 2024-10-11 ENCOUNTER — HOSPITAL ENCOUNTER (OUTPATIENT)
Age: 74
Discharge: HOME | End: 2024-10-13
Payer: MEDICARE

## 2024-10-11 DIAGNOSIS — J44.9 COPD, MODERATE (HCC): ICD-10-CM

## 2024-10-11 PROCEDURE — 97161 PT EVAL LOW COMPLEX 20 MIN: CPT

## 2024-10-11 PROCEDURE — 71046 X-RAY EXAM CHEST 2 VIEWS: CPT

## 2024-10-11 PROCEDURE — 97110 THERAPEUTIC EXERCISES: CPT

## 2024-10-11 NOTE — CONSULTS
[x] [facility]  Outpatient Rehabilitation &  Therapy  [address]  [phone]  [phone] [] [facility]  Outpatient Rehabilitation &  Therapy  [address]  P: [phone]  F: [phone] [] [facility]  Outpatient Rehabilitation &  Therapy  [address]  P: [phone]  F: [phone] [] [facility]  Outpatient Rehabilitation &  Therapy  [address]  [phone]  [phone] [] [facility]  Outpatient Rehabilitation &  Therapy  [address]   P: [phone]  F: [phone]  [] [facility]  Outpatient Rehabilitation &  Therapy  [address]  P: [phone]  F: [phone] [] [facility]  Outpatient Rehabilitation &  Therapy  [address]  P: [phone]  F: [phone] [] [facility]  Outpatient Rehabilitation &  Therapy  [address]  P: [phone]  F: [phone] [] [facility]  Outpatient Rehabilitation &  Therapy  [address]  P: [phone]  F: [phone]  [] [facility] Outpatient Rehabilitation &  Therapy  [address]  P: [phone]  F: [phone]     Physical Therapy Spine Evaluation    Date:  10/11/2024  Patient: Kaylee Purvis  : 1950  MRN: [id number]  Physician: Laquita Mckeon NP   Insurance: [facility] MEDICARE DUAL   Medical Diagnosis: Bilateral shoulder pain  M25.519   Rehab Codes: M54.6, M54.2, M25.60, M62.81  Onset Date: 24  Next 's appt.:     Subjective:   CC: Upper back pain, neck pain; tightness; difficulty standing for too long or walking too far; difficulty lifting  HPI: (onset date): Pt arrives for physical therapy evaluation after undergoing the following procedures after sustaining a T4 burst fracture from a fall:     T2-T6 POSTERIOR INSTRUMENTATION FUSION  Open

## 2024-10-15 ENCOUNTER — HOSPITAL ENCOUNTER (OUTPATIENT)
Dept: PHYSICAL THERAPY | Age: 74
Setting detail: THERAPIES SERIES
Discharge: HOME | End: 2024-10-15
Payer: MEDICARE

## 2024-10-15 NOTE — FLOWSHEET NOTE
[x] [facility]  Outpatient Rehabilitation &  Therapy  [address]  [phone]  [phone]     Therapy Cancel/No Show note    Date: 10/15/2024  Patient: Kaylee Purvis  : 1950  MRN: [id number]    Cancels/No Shows to date:     For today's appointment patient:    [x]  Cancelled    [] Rescheduled appointment    [] No-show     Reason given by patient:    []  Patient ill    []  Conflicting appointment    [] No transportation      [] Conflict with work    [] No reason given    [] Weather related    [] COVID-19    [x] Other:      Comments:  Family issues.  Cx all appt wk of 10/25/24.  Pt was previously issued written copy of schedule for wk of 10/21/24      [] Next appointment was confirmed    Electronically signed by: GUS FRENCH PTA

## 2024-10-17 ENCOUNTER — APPOINTMENT (OUTPATIENT)
Dept: PHYSICAL THERAPY | Age: 74
End: 2024-10-17
Payer: MEDICARE

## 2024-10-22 ENCOUNTER — HOSPITAL ENCOUNTER (OUTPATIENT)
Dept: PHYSICAL THERAPY | Age: 74
Setting detail: THERAPIES SERIES
Discharge: HOME | End: 2024-10-22
Payer: MEDICARE

## 2024-10-22 PROCEDURE — 97140 MANUAL THERAPY 1/> REGIONS: CPT

## 2024-10-22 PROCEDURE — 97110 THERAPEUTIC EXERCISES: CPT

## 2024-10-22 NOTE — FLOWSHEET NOTE
increasing pain  Improve Oswestry to 55% functional impairment        Patient goals: To get the tightness to go away.      Pt. Education:  [x] Yes  [] No  [x] Reviewed Prior HEP/Ed  Method of Education: [x] Verbal  [x] Demo  [] Written  Comprehension of Education:  [x] Verbalizes understanding.  [x] Demonstrates understanding.  [x] Needs review.  [] Demonstrates/verbalizes HEP/Ed previously given.     Plan: [x] Continue current frequency toward long and short term goals.    [x] Specific Instructions for subsequent treatments: seated trunk rotations, trunk/thoracic extension stretching as able, continue manual or add modalities as needed    Frequency:  2 x/week for 12 visits     Time In: 102 pm            Time Out: 148 pm    Electronically signed by:  Dione Bell PTA

## 2024-10-24 ENCOUNTER — HOSPITAL ENCOUNTER (OUTPATIENT)
Dept: PHYSICAL THERAPY | Age: 74
Setting detail: THERAPIES SERIES
Discharge: HOME | End: 2024-10-24
Payer: MEDICARE

## 2024-10-24 PROCEDURE — 97140 MANUAL THERAPY 1/> REGIONS: CPT

## 2024-10-24 PROCEDURE — 97110 THERAPEUTIC EXERCISES: CPT

## 2024-10-24 NOTE — FLOWSHEET NOTE
[x] [facility]  Outpatient Rehabilitation &  Therapy  [address]  [phone]  [phone]     Physical Therapy Daily Treatment Note    Date:  10/24/2024  Patient Name:  Kaylee Purvis    :  1950  MRN: [id number]  Physician: Laquita Mckeon NP                                 Insurance: [facility] MEDICARE DUAL   Medical Diagnosis: Bilateral shoulder pain  M25.519            Rehab Codes: M54.6, M54.2, M25.60, M62.81  Onset Date: 24                 Next 's appt.:     Visit# / total visits: 3/12; Progress note for Medicare patient due at visit 10     Cancels/No Shows:     Subjective:    Pain:  [x] Yes  [] No  Location: Upper back, neck, shoulders          Pain Rating: (0-10 scale) 10/10  Pain altered Tx:  [] Yes  [x] No  Action:  Comments: Patient reports she is \"terrible\" today, increased pain at 10/10.  Feels like something sharp in the middle of her back all the time.  Last Rx wasn't too bad, hypervolt helped decrease her pain.      Objective:  Modalities:   Precautions [] No  [x] Yes: s/p T2-T6 fusion 24  Exercises:  Exercise Reps/ Time Weight/ Level Comments   Nustep 5min L3          Pulleys  2 min  Flex    Corner pec stretch 4x15\"  Tactile cueing for increased chest towards corner to feel more pec, less low back; progressed hold time 10/24         Seated      Shrugs 20x     retro shoulder rolls 20x       scapular retraction 10x  3sec Progressed hold time 10/24    UT stretch      CROM  10x  Cerv extension, added 10/24   theraband B ER 15x Lime    theraband bicep curls 15x Lime    theraband triceps 15x Lime          Supine      Cane flexion 15x     Cane chest press 15x     Cane HAB 15x     Cerv rotation  10x  Keep painfree, added 10/24   Cerv Retraction uzma  10x 3sec Added 10/24   Open the book stretch 5x10\"  Bilateral sidelying; progressed hold time 10/24         Other:  Manaul: seated in chair at end of Rx, hypervolt w/very minimal pressure, kept very gentle to B

## 2024-10-29 ENCOUNTER — HOSPITAL ENCOUNTER (OUTPATIENT)
Dept: PHYSICAL THERAPY | Age: 74
Setting detail: THERAPIES SERIES
Discharge: HOME | End: 2024-10-29
Payer: MEDICARE

## 2024-10-29 PROCEDURE — 97110 THERAPEUTIC EXERCISES: CPT

## 2024-10-29 PROCEDURE — 97140 MANUAL THERAPY 1/> REGIONS: CPT

## 2024-10-29 NOTE — FLOWSHEET NOTE
right), weakness of the upper back, core, and bilateral shoulders, postural impairments, and functional impairments including difficulty standing for longer periods of time, difficulty walking longer distances, and difficulty lifting.     STG: (to be met in 8 treatments)  ? Pain:Decrease upper back pain to 6/10  ? ROM: Increase cervical extension to 20°, left rotation to 35°, left sidebend to 30°; Increase thoracic rotation to the left equal to the right; Increase left shoulder AROM Flex to 125°, abd 100° in order to improve the pt's ability to perform all ADL's with less difficulty   ? Strength: Increase scapular strength as demonstrated by progression of PRE's; Increase B shoulder MMT- Flex and Abd L 4-/5, R 4+/5 in order to improve the pt's ability to perform all ADL's with less difficulty  ? Function: Improve Oswestry to 60% functional impairment  Patient to be independent with home exercise program as demonstrated by performance with correct form without cues.  LTG: (to be met in 12 treatments)  Decrease upper back pain to 4/10  Pt will be able to stand for one hour without increasing pain  Pt will be able to walk 3/4-1mile without increasing pain  Improve Oswestry to 55% functional impairment        Patient goals: To get the tightness to go away.      Pt. Education:  [x] Yes  [] No  [x] Reviewed Prior HEP/Ed  Method of Education: [x] Verbal  [x] Demo  [x] Written  Comprehension of Education:  [x] Verbalizes understanding.  [x] Demonstrates understanding.  [x] Needs review.  [] Demonstrates/verbalizes HEP/Ed previously given.     Access Code: [email or web address]  URL: [email or web address]  Date: 10/29/2024  Prepared by: Christine Durán    Exercises  - Doorway Pec Stretch at 90 Elevation with Arm Straight   shlomo arms- 2-3 x daily - 7 x weekly - 1 sets - 3 reps - 20 hold  - Seated Cervical Retraction  - 2-3 x daily - 7 x weekly - 1 sets - 10 reps - 5 hold  - Seated Scapular Retraction  - 2-3 x daily - 7 x weekly - 1 sets

## 2024-10-31 ENCOUNTER — HOSPITAL ENCOUNTER (OUTPATIENT)
Dept: PHYSICAL THERAPY | Age: 74
Setting detail: THERAPIES SERIES
Discharge: HOME | End: 2024-10-31
Payer: MEDICARE

## 2024-10-31 PROCEDURE — 97140 MANUAL THERAPY 1/> REGIONS: CPT

## 2024-10-31 PROCEDURE — 97110 THERAPEUTIC EXERCISES: CPT

## 2024-10-31 NOTE — FLOWSHEET NOTE
in Sitting  - 2-3 x daily - 7 x weekly - 1 sets - 10 reps    Plan: [x] Continue current frequency toward long and short term goals.    [x] Specific Instructions for subsequent treatments: seated trunk rotations, trunk/thoracic extension stretching as able, continue manual or consider add modalities (HP/ES) as needed    Frequency:  2 x/week for 12 visits     Time In:  1250          Time Out:  133 pm    Electronically signed by:  Dione Bell PTA

## 2024-11-05 ENCOUNTER — HOSPITAL ENCOUNTER (OUTPATIENT)
Dept: PHYSICAL THERAPY | Age: 74
Setting detail: THERAPIES SERIES
Discharge: HOME | End: 2024-11-05
Payer: MEDICARE

## 2024-11-05 PROCEDURE — 97140 MANUAL THERAPY 1/> REGIONS: CPT

## 2024-11-05 PROCEDURE — 97110 THERAPEUTIC EXERCISES: CPT

## 2024-11-05 NOTE — FLOWSHEET NOTE
self op, added 10/29 x   Cervical retraction  10x3\"       CROM  10x  Cerv extension, added 10/24    theraband B ER 15x Lime     theraband bicep curls 15x Lime     theraband triceps 15x Lime            Supine       Cane flexion 15x      Cane chest press 15x      Cane HAB 15x      Cerv rotation  10x  Keep painfree, added 10/24    Cerv Retraction uzma  10x 3sec Added 10/24    Open the book stretch 5x10\"  Bilateral sidelying; progressed hold time 10/24           TG prone   L30 Towel roll forehead and pillow at pelvis    Scap depression w/ scap. Retraction  2x10      Middle traps 2x10  Thumb up     Rhomboids  2x10  Pinky up            Other:   Manaul: seated in chair at end of Rx Manual Side lying: STM UT,  rhomboid areas then TPR to UT  Total 20 mins.        Treatment Charges: Mins Units   []  Modalities       [x]  Ther Exercise  30 2   []  Neuromuscular Re-ed     []  Gait Training     [x]  Manual Therapy 20 1   []  Ther Activities     []  Aquatics     []  Vasocompression     []  Cervical Traction     []  Other     Total Billable time  50 3         Assessment: [x] Progressing toward goals.  Secondary to continued pain and symptoms and increased symptoms noted at arrival this date, increased focus on thoracic spine movement and stretching as well as general lumbar and thoracic ROM with additional exercises and stretches added. Patient noted min to mod fatigue with isolated thoracic movements and stretches this date. Increased time spent with manual interventions to regions noted above as well with patient stating some relief post. Deferred heat modalities in clinic stating applies heat at home.     [] No change.        [x] Other: Patient questioned implication and benefit of aquatic therapy this date. Writer told her could benefit greatly from aquatics and transferring to aquatic clinic is easy to complete, however, patient stated will \"finish the month\" here with therapy and then decide.  [x] Patient would continue to

## 2024-11-08 ENCOUNTER — HOSPITAL ENCOUNTER (OUTPATIENT)
Dept: PHYSICAL THERAPY | Age: 74
Setting detail: THERAPIES SERIES
Discharge: HOME | End: 2024-11-08
Payer: MEDICARE

## 2024-11-08 PROCEDURE — G0283 ELEC STIM OTHER THAN WOUND: HCPCS

## 2024-11-08 PROCEDURE — 97110 THERAPEUTIC EXERCISES: CPT

## 2024-11-08 NOTE — FLOWSHEET NOTE
goals: To get the tightness to go away.      Pt. Education:  [x] Yes  [] No  [x] Reviewed Prior HEP/Ed  Method of Education: [x] Verbal  [x] Demo  [x] Written  Comprehension of Education:  [x] Verbalizes understanding.  [x] Demonstrates understanding.  [x] Needs review.  [] Demonstrates/verbalizes HEP/Ed previously given.     Access Code: [email or web address]  URL: [email or web address]  Date: 10/29/2024  Prepared by: Christine Durán    Exercises  - Doorway Pec Stretch at 90 Elevation with Arm Straight   shlomo arms- 2-3 x daily - 7 x weekly - 1 sets - 3 reps - 20 hold  - Seated Cervical Retraction  - 2-3 x daily - 7 x weekly - 1 sets - 10 reps - 5 hold  - Seated Scapular Retraction  - 2-3 x daily - 7 x weekly - 1 sets - 10 reps  - Shoulder Rolls in Sitting  - 2-3 x daily - 7 x weekly - 1 sets - 10 reps    Plan: [x] Continue current frequency toward long and short term goals.    [x] Specific Instructions for subsequent treatments: continue estim/HP, resume prone exs    Frequency:  2 x/week for 12 visits     Time In: 230  pm          Time Out: 323 pm    Electronically signed by:  Dione Bell PTA

## 2024-11-13 ENCOUNTER — HOSPITAL ENCOUNTER (OUTPATIENT)
Dept: PHYSICAL THERAPY | Age: 74
Setting detail: THERAPIES SERIES
Discharge: HOME | End: 2024-11-13
Payer: MEDICARE

## 2024-11-13 NOTE — FLOWSHEET NOTE
[x] [facility]  Outpatient Rehabilitation &  Therapy  [address]  [phone]  [phone]     Therapy Cancel/No Show note    Date: 2024  Patient: Kaylee Purvis  : 1950  MRN: [id number]    Cancels/No Shows to date:     For today's appointment patient:    [x]  Cancelled    [] Rescheduled appointment    [] No-show     Reason given by patient:    []  Patient ill    []  Conflicting appointment    [x] No transportation      [] Conflict with work    [] No reason given    [] Weather related    [] COVID-19    [x] Other:      Comments: Patient called stating her ride did not come.       [x] Next appointment was confirmed PREVIOUSLY     Electronically signed by: Dione Bell PTA

## 2024-11-15 ENCOUNTER — HOSPITAL ENCOUNTER (OUTPATIENT)
Dept: PHYSICAL THERAPY | Age: 74
Setting detail: THERAPIES SERIES
Discharge: HOME | End: 2024-11-15
Payer: MEDICARE

## 2024-11-15 PROCEDURE — 97110 THERAPEUTIC EXERCISES: CPT

## 2024-11-15 PROCEDURE — G0283 ELEC STIM OTHER THAN WOUND: HCPCS

## 2024-11-15 NOTE — FLOWSHEET NOTE
majority of goals set forth for this timeframe. However, pain and functional limitations remain reducing patient's QOL and functional progress. Discussion of current symptoms, prognosis, and POC remaining. Patient and therapist discuss patient previous interest in aquatics and educated on process of transfer to aquatic facility. Patient agreeable to hold remaining visits here and begin transition to aquatic program. Educated patient will have primary PT send progress note to referring doctor requesting to add aquatic therapy and clinic will be in contact with patient when this has been received.      [] Other:   [x] Patient would continue to benefit from skilled physical therapy services in order to:  address upper back pain, limited ROM of the cervical and thoracic spine, limited ROM of B shoulders (left worse than right), weakness of the upper back, core, and bilateral shoulders, postural impairments, and functional impairments including difficulty standing for longer periods of time, difficulty walking longer distances, and difficulty lifting.     STG: (to be met in 8 treatments)  ? Pain:Decrease upper back pain to 6/10 - Not met, patient reports really does not get lower than 7/10  ? ROM: Increase cervical extension to 20°, left rotation to 35°, left sidebend to 30°; Increase thoracic rotation to the left equal to the right; Increase left shoulder AROM Flex to 125°, abd 100° in order to improve the pt's ability to perform all ADL's with less difficulty MET  ? Strength: Increase scapular strength as demonstrated by progression of PRE's; Increase B shoulder MMT- Flex and Abd L 4-/5, R 4+/5 in order to improve the pt's ability to perform all ADL's with less difficulty MET  ? Function: Improve Oswestry to 60% functional impairment - MET 46% impairment  Patient to be independent with home exercise program as demonstrated by performance with correct form without cues. MET - reports completing some shoulder ROM and

## 2024-12-03 ENCOUNTER — TELEPHONE (OUTPATIENT)
Age: 74
End: 2024-12-03

## 2024-12-03 NOTE — TELEPHONE ENCOUNTER
Patient called nurse line with complaints of persistent pain to mid to lower back. T2-T6 POSTERIOR INSTRUMENTATION FUSION, open treatment of thoracic fracture, posterior segmental instrumentation at T2-T6, posterior onlay arthrodesis at T2-T6 on 1/31/2024.     Pt states that she has seen pain management, PT/OT and has seen a chiropractor twice with a third appt this Friday. Pt is now scheduled to see Dr. Molina on 01/06/25 to address all concerns.

## 2025-02-11 ENCOUNTER — OFFICE VISIT (OUTPATIENT)
Age: 75
End: 2025-02-11
Payer: MEDICARE

## 2025-02-11 VITALS
WEIGHT: 146 LBS | HEART RATE: 70 BPM | BODY MASS INDEX: 24.92 KG/M2 | RESPIRATION RATE: 14 BRPM | SYSTOLIC BLOOD PRESSURE: 123 MMHG | DIASTOLIC BLOOD PRESSURE: 69 MMHG | HEIGHT: 64 IN

## 2025-02-11 DIAGNOSIS — G89.29 CHRONIC MIDLINE THORACIC BACK PAIN: ICD-10-CM

## 2025-02-11 DIAGNOSIS — Z98.890 PERSONAL HISTORY OF SPINE SURGERY: ICD-10-CM

## 2025-02-11 DIAGNOSIS — M54.6 CHRONIC MIDLINE THORACIC BACK PAIN: ICD-10-CM

## 2025-02-11 DIAGNOSIS — S22.040D COMPRESSION FRACTURE OF T4 VERTEBRA WITH ROUTINE HEALING, SUBSEQUENT ENCOUNTER: Primary | ICD-10-CM

## 2025-02-11 PROCEDURE — 99214 OFFICE O/P EST MOD 30 MIN: CPT | Performed by: NEUROLOGICAL SURGERY

## 2025-02-11 PROCEDURE — 1123F ACP DISCUSS/DSCN MKR DOCD: CPT | Performed by: NEUROLOGICAL SURGERY

## 2025-02-11 PROCEDURE — G8419 CALC BMI OUT NRM PARAM NOF/U: HCPCS | Performed by: NEUROLOGICAL SURGERY

## 2025-02-11 PROCEDURE — G8427 DOCREV CUR MEDS BY ELIG CLIN: HCPCS | Performed by: NEUROLOGICAL SURGERY

## 2025-02-11 PROCEDURE — 1159F MED LIST DOCD IN RCRD: CPT | Performed by: NEUROLOGICAL SURGERY

## 2025-02-11 PROCEDURE — 3078F DIAST BP <80 MM HG: CPT | Performed by: NEUROLOGICAL SURGERY

## 2025-02-11 PROCEDURE — G8399 PT W/DXA RESULTS DOCUMENT: HCPCS | Performed by: NEUROLOGICAL SURGERY

## 2025-02-11 PROCEDURE — 3017F COLORECTAL CA SCREEN DOC REV: CPT | Performed by: NEUROLOGICAL SURGERY

## 2025-02-11 PROCEDURE — 3074F SYST BP LT 130 MM HG: CPT | Performed by: NEUROLOGICAL SURGERY

## 2025-02-11 PROCEDURE — 4004F PT TOBACCO SCREEN RCVD TLK: CPT | Performed by: NEUROLOGICAL SURGERY

## 2025-02-11 PROCEDURE — 1090F PRES/ABSN URINE INCON ASSESS: CPT | Performed by: NEUROLOGICAL SURGERY

## 2025-02-11 RX ORDER — CYCLOBENZAPRINE HCL 5 MG
TABLET ORAL
COMMUNITY
Start: 2024-11-21

## 2025-02-11 RX ORDER — BACLOFEN 10 MG/1
5 TABLET ORAL NIGHTLY PRN
Qty: 15 TABLET | Refills: 0 | Status: SHIPPED | OUTPATIENT
Start: 2025-02-11

## 2025-02-12 NOTE — PROGRESS NOTES
[facility], [facility], [facility] NEUROSURGERY  [address], [address]  [facility] [place] [address]  Dept: [phone]  Dept Fax: [phone]     Patient:  Kaylee Purvis  YOB: 1950  Date: 2/11/25      Chief Complaint   Patient presents with    Follow-up     still having back pain with rt leg pain - last seen 6/06/2024  no recent tesing              HPI:     History of Present Illness  The patient is a 74-year-old woman who presents for evaluation of persistent pain following surgery in January of last year.    She reports that her condition has deteriorated over the past 8 months, despite undergoing therapy twice, chiropractic treatment, and a single visit to pain management. She describes her pain as originating from her neck and radiating across the top of her shoulders, characterized by a sensation of tightness and sharp, pin-like discomfort. Occasionally, she experiences soreness in her right arm. Her daily activities are significantly hampered by her condition, necessitating frequent rest periods due to progressive back tightness. The pain intensifies with movement, often reducing her to tears. She reports no radiating leg pain, tingling, or numbness. She also reports persistent soreness on the left side at the base of her neck. Her mobility is limited, preventing her from walking for extended periods or running errands without needing to sit down. She has not been prescribed any medication for her condition. She recalls being prescribed tizanidine around the time of her surgery, but it induced sleepiness. She has not tried Flexeril but remembers taking a course of 10 muscle relaxant pills, although she does not recall the specific medication. She was prescribed oral steroids, which unfortunately did not alleviate her symptoms. She has received four injections in her back at [facility], which also failed to provide

## 2025-02-20 ENCOUNTER — HOSPITAL ENCOUNTER (OUTPATIENT)
Dept: MRI IMAGING | Age: 75
Discharge: HOME | End: 2025-02-22
Attending: NEUROLOGICAL SURGERY
Payer: MEDICARE

## 2025-02-20 DIAGNOSIS — G89.29 CHRONIC MIDLINE THORACIC BACK PAIN: ICD-10-CM

## 2025-02-20 DIAGNOSIS — Z98.890 PERSONAL HISTORY OF SPINE SURGERY: ICD-10-CM

## 2025-02-20 DIAGNOSIS — S22.040D COMPRESSION FRACTURE OF T4 VERTEBRA WITH ROUTINE HEALING, SUBSEQUENT ENCOUNTER: ICD-10-CM

## 2025-02-20 DIAGNOSIS — M54.6 CHRONIC MIDLINE THORACIC BACK PAIN: ICD-10-CM

## 2025-02-20 PROCEDURE — 72141 MRI NECK SPINE W/O DYE: CPT

## 2025-02-20 PROCEDURE — 72146 MRI CHEST SPINE W/O DYE: CPT

## 2025-03-27 ENCOUNTER — OFFICE VISIT (OUTPATIENT)
Age: 75
End: 2025-03-27
Payer: MEDICARE

## 2025-03-27 VITALS
WEIGHT: 146 LBS | HEIGHT: 64 IN | DIASTOLIC BLOOD PRESSURE: 72 MMHG | SYSTOLIC BLOOD PRESSURE: 120 MMHG | BODY MASS INDEX: 24.92 KG/M2 | HEART RATE: 68 BPM

## 2025-03-27 DIAGNOSIS — Z98.890 PERSONAL HISTORY OF SPINE SURGERY: ICD-10-CM

## 2025-03-27 DIAGNOSIS — S22.040D COMPRESSION FRACTURE OF T4 VERTEBRA WITH ROUTINE HEALING, SUBSEQUENT ENCOUNTER: Primary | ICD-10-CM

## 2025-03-27 DIAGNOSIS — G89.29 CHRONIC MIDLINE THORACIC BACK PAIN: ICD-10-CM

## 2025-03-27 DIAGNOSIS — M54.6 CHRONIC MIDLINE THORACIC BACK PAIN: ICD-10-CM

## 2025-03-27 PROCEDURE — 1090F PRES/ABSN URINE INCON ASSESS: CPT | Performed by: NEUROLOGICAL SURGERY

## 2025-03-27 PROCEDURE — G8427 DOCREV CUR MEDS BY ELIG CLIN: HCPCS | Performed by: NEUROLOGICAL SURGERY

## 2025-03-27 PROCEDURE — 3078F DIAST BP <80 MM HG: CPT | Performed by: NEUROLOGICAL SURGERY

## 2025-03-27 PROCEDURE — G8419 CALC BMI OUT NRM PARAM NOF/U: HCPCS | Performed by: NEUROLOGICAL SURGERY

## 2025-03-27 PROCEDURE — G8399 PT W/DXA RESULTS DOCUMENT: HCPCS | Performed by: NEUROLOGICAL SURGERY

## 2025-03-27 PROCEDURE — 4004F PT TOBACCO SCREEN RCVD TLK: CPT | Performed by: NEUROLOGICAL SURGERY

## 2025-03-27 PROCEDURE — 1123F ACP DISCUSS/DSCN MKR DOCD: CPT | Performed by: NEUROLOGICAL SURGERY

## 2025-03-27 PROCEDURE — 1159F MED LIST DOCD IN RCRD: CPT | Performed by: NEUROLOGICAL SURGERY

## 2025-03-27 PROCEDURE — 3074F SYST BP LT 130 MM HG: CPT | Performed by: NEUROLOGICAL SURGERY

## 2025-03-27 PROCEDURE — 99214 OFFICE O/P EST MOD 30 MIN: CPT | Performed by: NEUROLOGICAL SURGERY

## 2025-03-28 NOTE — PROGRESS NOTES
[facility], [facility], [facility] NEUROSURGERY  [address], [address]  [place] [address]  Dept: [phone]  Dept Fax: [phone]     Patient:  Kaylee Purvis  YOB: 1950  Date: 3/27/25      Chief Complaint   Patient presents with    Follow-up     Cervical/Thoracic- follow up, MRI Cervical/Thoracic@[facility] 02/20/2025           HPI:     History of Present Illness  The patient is a 75-year-old woman who presents for follow-up on new MRI results.    Persistent pain is reported, which has remained unchanged. Approximately 1.5 weeks ago, discomfort began in the right shoulder, extending from the arm to the top of the shoulder. Attempts to alleviate the pain with a heating pad have proven ineffective. Therapeutic exercises have not been engaged in due to their perceived lack of efficacy. Surgery was performed in late January, 2024. Four injections in the back have been received from a pain management specialist, and chiropractic care and therapy have been sought twice. Difficulty in performing yoga due to limited flexibility is expressed, and unfamiliarity with [employer] as a potential resource for exercise guidance is noted. Occasionally, sharp, stabbing pain is experienced when leaning the back against a surface.    Intermittent knee issues are reported, describing instances where the rightknee gives out.    INTERVAL: Since last visit, new discomfort in the right shoulder extending from the arm to the top of the shoulder has been reported.    MEDICATIONS  PREVIOUS MEDS:  Pain management injections           History:     Past Medical History:   Diagnosis Date    Hypertension      Past Surgical History:   Procedure Laterality Date    LUMBAR FUSION N/A 1/31/2024    T2-T6 POSTERIOR INSTRUMENTATION FUSION performed by David Molina MD at [facility] OR    THORACIC SPINE SURGERY  01/31/2024    T2-T6 POSTERIOR INSTRUMENTATION FUSION POSSIBLE

## 2025-04-03 ENCOUNTER — OFFICE VISIT (OUTPATIENT)
Age: 75
End: 2025-04-03

## 2025-04-03 VITALS
OXYGEN SATURATION: 98 % | BODY MASS INDEX: 25.52 KG/M2 | HEART RATE: 60 BPM | WEIGHT: 144 LBS | DIASTOLIC BLOOD PRESSURE: 83 MMHG | SYSTOLIC BLOOD PRESSURE: 139 MMHG | HEIGHT: 63 IN | TEMPERATURE: 97.3 F

## 2025-04-03 DIAGNOSIS — D23.5 DILATED PORE OF WINER OF BACK: Primary | ICD-10-CM

## 2025-04-03 DIAGNOSIS — D17.22 LIPOMA OF LEFT UPPER EXTREMITY: ICD-10-CM

## 2025-04-03 PROCEDURE — 4004F PT TOBACCO SCREEN RCVD TLK: CPT | Performed by: PHYSICIAN ASSISTANT

## 2025-04-03 NOTE — PROGRESS NOTES
Dermatology Patient Note  [facility]  [facility] DERMATOLOGY  [address] [place] [address]  Dept: [phone]  Dept Fax: [phone]      VISITDATE: 4/3/2025   REFERRING PROVIDER: Laquita Mckeon DTR Patricia JENA Purvis is a 75 y.o. female  who presents today in the office for:    Other (Patient presents today for a lesion on the right shoulder.  Lesion present x 3 years.  No current treatment.  Lesion is itchy at times.  No h/o skin cx.  )      HISTORY OF PRESENT ILLNESS:  As above.    MEDICAL PROBLEMS:  Patient Active Problem List    Diagnosis Date Noted    History of thoracic spinal fusion 07/11/2024    Mid back pain 07/11/2024    Multiple closed fractures of ribs of both sides 01/31/2024    Compression fracture of T4 vertebra (HCC) 01/31/2024    Goals of care, counseling/discussion 01/31/2024    Encounter for palliative care 01/31/2024    Burst fracture of fourth thoracic vertebra (HCC) 01/30/2024    Essential hypertension 11/06/2020    Murmur, cardiac 11/06/2020    Hyperglycemia 11/06/2020    Adrenal nodule 11/06/2020    Chronic pain of right knee 11/06/2020    Nodule of lower lobe of left lung 11/06/2020       CURRENT MEDICATIONS:   Current Outpatient Medications   Medication Sig Dispense Refill    amoxicillin-clavulanate (AUGMENTIN) 875-125 MG per tablet       cyclobenzaprine (FLEXERIL) 5 MG tablet       baclofen (LIORESAL) 10 MG tablet Take 0.5 tablets by mouth nightly as needed (muscle spasms) 15 tablet 0    diclofenac sodium (VOLTAREN) 1 % GEL Apply 2 g topically 4 times daily 100 g 1    atorvastatin (LIPITOR) 20 MG tablet Take 1 tablet by mouth daily      Cholecalciferol (VITAMIN D3) 50 MCG (2000 UT) TABS Take 1 tablet by mouth daily      amLODIPine (NORVASC) 10 MG tablet Take 1 tablet by mouth daily 30 tablet 0    potassium chloride (KLOR-CON M) 10 MEQ extended release tablet Take 1 tablet by mouth in the morning and 1 tablet before bedtime. 180 tablet 0

## (undated) DEVICE — PROTECTOR ULN NRV PUR FOAM HK LOOP STRP ANATOMICALLY

## (undated) DEVICE — MARKER,SKIN,WI/RULER AND LABELS: Brand: MEDLINE

## (undated) DEVICE — SUTURE VCRL SZ 3-0 L18IN ABSRB UD L26MM SH 1/2 CIR J864D

## (undated) DEVICE — SUTURE VCRL SZ 0 L18IN ABSRB UD L36MM CT-1 1/2 CIR J840D

## (undated) DEVICE — BLADE ES ELASTOMERIC COAT INSUL DURABLE BEND UPTO 90DEG

## (undated) DEVICE — ELECTRODE PT RET AD L9FT HI MOIST COND ADH HYDRGEL CORDED

## (undated) DEVICE — STVZ LUMBAR SPINE PACK: Brand: MEDLINE INDUSTRIES, INC.

## (undated) DEVICE — GLOVE SURG SZ 8 L12IN FNGR THK79MIL GRN LTX FREE

## (undated) DEVICE — SUTURE VCRL SZ 2-0 L18IN ABSRB UD CT-1 L36MM 1/2 CIR J839D

## (undated) DEVICE — STRAP ARMBRD W1.5XL32IN FOAM STR YET SFT W/ HK AND LOOP

## (undated) DEVICE — 1LYRTR 16FR10ML100%SIL UMS SNP: Brand: MEDLINE INDUSTRIES, INC.

## (undated) DEVICE — GLOVE SURG SZ 75 CRM LTX FREE POLYISOPRENE POLYMER BEAD ANTI

## (undated) DEVICE — Device

## (undated) DEVICE — WAX SURG 2.5GM HEMSTAT BNE BEESWAX PARAFFIN ISO PALMITATE

## (undated) DEVICE — DRAPE,REIN 53X77,STERILE: Brand: MEDLINE

## (undated) DEVICE — SYRINGE,CONTROL,LL,FINGER,GRIP: Brand: MEDLINE INDUSTRIES, INC.

## (undated) DEVICE — 60 ML SYRINGE LUER-LOCK TIP: Brand: MONOJECT

## (undated) DEVICE — APPLICATOR MEDICATED 26 CC SOLUTION HI LT ORNG CHLORAPREP

## (undated) DEVICE — STRAP,POSITIONING,KNEE/BODY,FOAM,4X60": Brand: MEDLINE

## (undated) DEVICE — STANDARD HYPODERMIC NEEDLE,POLYPROPYLENE HUB: Brand: MONOJECT

## (undated) DEVICE — KIT EVAC 0.13IN RECT TB DIA10FR 400CC PVC 3 SPR Y CONN DRN

## (undated) DEVICE — SYRINGE, LUER LOCK, 10ML: Brand: MEDLINE